# Patient Record
Sex: MALE | URBAN - METROPOLITAN AREA
[De-identification: names, ages, dates, MRNs, and addresses within clinical notes are randomized per-mention and may not be internally consistent; named-entity substitution may affect disease eponyms.]

---

## 2022-03-14 ENCOUNTER — INPATIENT (INPATIENT)
Facility: HOSPITAL | Age: 57
LOS: 14 days | Discharge: NURSING, REHAB OR LONG-TERM CARE | DRG: 282 | End: 2022-03-29
Attending: FAMILY MEDICINE | Admitting: INTERNAL MEDICINE
Payer: MEDICAID

## 2022-03-14 VITALS
DIASTOLIC BLOOD PRESSURE: 96 MMHG | WEIGHT: 134.92 LBS | SYSTOLIC BLOOD PRESSURE: 160 MMHG | TEMPERATURE: 99 F | RESPIRATION RATE: 20 BRPM | OXYGEN SATURATION: 100 % | HEIGHT: 66 IN | HEART RATE: 93 BPM

## 2022-03-14 DIAGNOSIS — K92.0 HEMATEMESIS: ICD-10-CM

## 2022-03-14 DIAGNOSIS — E83.51 HYPOCALCEMIA: ICD-10-CM

## 2022-03-14 DIAGNOSIS — K63.2 FISTULA OF INTESTINE: ICD-10-CM

## 2022-03-14 DIAGNOSIS — F32.9 MAJOR DEPRESSIVE DISORDER, SINGLE EPISODE, UNSPECIFIED: ICD-10-CM

## 2022-03-14 DIAGNOSIS — K80.20 CALCULUS OF GALLBLADDER WITHOUT CHOLECYSTITIS WITHOUT OBSTRUCTION: ICD-10-CM

## 2022-03-14 DIAGNOSIS — K31.1 ADULT HYPERTROPHIC PYLORIC STENOSIS: ICD-10-CM

## 2022-03-14 DIAGNOSIS — K59.00 CONSTIPATION, UNSPECIFIED: ICD-10-CM
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
## 2022-03-14 DIAGNOSIS — F11.20 OPIOID DEPENDENCE, UNCOMPLICATED: ICD-10-CM

## 2022-03-14 DIAGNOSIS — M46.44 DISCITIS, UNSPECIFIED, THORACIC REGION: ICD-10-CM

## 2022-03-14 DIAGNOSIS — K94.21 GASTROSTOMY HEMORRHAGE: ICD-10-CM

## 2022-03-14 DIAGNOSIS — K85.90 ACUTE PANCREATITIS WITHOUT NECROSIS OR INFECTION, UNSPECIFIED: ICD-10-CM

## 2022-03-14 DIAGNOSIS — R42 DIZZINESS AND GIDDINESS: ICD-10-CM

## 2022-03-14 DIAGNOSIS — K86.1 OTHER CHRONIC PANCREATITIS: ICD-10-CM

## 2022-03-14 DIAGNOSIS — E87.1 HYPO-OSMOLALITY AND HYPONATREMIA: ICD-10-CM

## 2022-03-14 DIAGNOSIS — E11.649 TYPE 2 DIABETES MELLITUS WITH HYPOGLYCEMIA WITHOUT COMA: ICD-10-CM

## 2022-03-14 DIAGNOSIS — K86.3 PSEUDOCYST OF PANCREAS: ICD-10-CM

## 2022-03-14 DIAGNOSIS — E43 UNSPECIFIED SEVERE PROTEIN-CALORIE MALNUTRITION: ICD-10-CM
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
## 2022-03-14 DIAGNOSIS — E88.09 OTHER DISORDERS OF PLASMA-PROTEIN METABOLISM, NOT ELSEWHERE CLASSIFIED: ICD-10-CM

## 2022-03-14 DIAGNOSIS — F41.9 ANXIETY DISORDER, UNSPECIFIED: ICD-10-CM

## 2022-03-14 DIAGNOSIS — D62 ACUTE POSTHEMORRHAGIC ANEMIA: ICD-10-CM

## 2022-03-14 DIAGNOSIS — K21.9 GASTRO-ESOPHAGEAL REFLUX DISEASE WITHOUT ESOPHAGITIS: ICD-10-CM

## 2022-03-14 DIAGNOSIS — Z79.4 LONG TERM (CURRENT) USE OF INSULIN: ICD-10-CM

## 2022-03-14 LAB
ALBUMIN SERPL ELPH-MCNC: 1.8 G/DL — LOW (ref 3.3–5)
ALP SERPL-CCNC: 1293 U/L — HIGH (ref 40–120)
ALT FLD-CCNC: 91 U/L — HIGH (ref 12–78)
ANION GAP SERPL CALC-SCNC: 6 MMOL/L — SIGNIFICANT CHANGE UP (ref 5–17)
APTT BLD: 31.4 SEC — SIGNIFICANT CHANGE UP (ref 27.5–35.5)
AST SERPL-CCNC: 63 U/L — HIGH (ref 15–37)
BASOPHILS # BLD AUTO: 0.02 K/UL — SIGNIFICANT CHANGE UP (ref 0–0.2)
BASOPHILS NFR BLD AUTO: 0.2 % — SIGNIFICANT CHANGE UP (ref 0–2)
BILIRUB SERPL-MCNC: 2.3 MG/DL — HIGH (ref 0.2–1.2)
BUN SERPL-MCNC: 11 MG/DL — SIGNIFICANT CHANGE UP (ref 7–23)
CALCIUM SERPL-MCNC: 8.5 MG/DL — SIGNIFICANT CHANGE UP (ref 8.5–10.1)
CHLORIDE SERPL-SCNC: 93 MMOL/L — LOW (ref 96–108)
CO2 SERPL-SCNC: 33 MMOL/L — HIGH (ref 22–31)
CREAT SERPL-MCNC: 0.53 MG/DL — SIGNIFICANT CHANGE UP (ref 0.5–1.3)
EGFR: 118 ML/MIN/1.73M2 — SIGNIFICANT CHANGE UP
EOSINOPHIL # BLD AUTO: 0.01 K/UL — SIGNIFICANT CHANGE UP (ref 0–0.5)
EOSINOPHIL NFR BLD AUTO: 0.1 % — SIGNIFICANT CHANGE UP (ref 0–6)
GLUCOSE SERPL-MCNC: 210 MG/DL — HIGH (ref 70–99)
HCT VFR BLD CALC: 27.9 % — LOW (ref 39–50)
HCT VFR BLD CALC: 28.4 % — LOW (ref 39–50)
HGB BLD-MCNC: 8.9 G/DL — LOW (ref 13–17)
HGB BLD-MCNC: 9.3 G/DL — LOW (ref 13–17)
IMM GRANULOCYTES NFR BLD AUTO: 0.3 % — SIGNIFICANT CHANGE UP (ref 0–1.5)
INR BLD: 1.28 RATIO — HIGH (ref 0.88–1.16)
LIDOCAIN IGE QN: 367 U/L — SIGNIFICANT CHANGE UP (ref 73–393)
LYMPHOCYTES # BLD AUTO: 1.04 K/UL — SIGNIFICANT CHANGE UP (ref 1–3.3)
LYMPHOCYTES # BLD AUTO: 8.9 % — LOW (ref 13–44)
MCHC RBC-ENTMCNC: 25.6 PG — LOW (ref 27–34)
MCHC RBC-ENTMCNC: 26.3 PG — LOW (ref 27–34)
MCHC RBC-ENTMCNC: 31.9 GM/DL — LOW (ref 32–36)
MCHC RBC-ENTMCNC: 32.7 GM/DL — SIGNIFICANT CHANGE UP (ref 32–36)
MCV RBC AUTO: 80.2 FL — SIGNIFICANT CHANGE UP (ref 80–100)
MCV RBC AUTO: 80.4 FL — SIGNIFICANT CHANGE UP (ref 80–100)
MONOCYTES # BLD AUTO: 0.59 K/UL — SIGNIFICANT CHANGE UP (ref 0–0.9)
MONOCYTES NFR BLD AUTO: 5 % — SIGNIFICANT CHANGE UP (ref 2–14)
NEUTROPHILS # BLD AUTO: 10.03 K/UL — HIGH (ref 1.8–7.4)
NEUTROPHILS NFR BLD AUTO: 85.5 % — HIGH (ref 43–77)
PLATELET # BLD AUTO: 520 K/UL — HIGH (ref 150–400)
PLATELET # BLD AUTO: 561 K/UL — HIGH (ref 150–400)
POTASSIUM SERPL-MCNC: 3.5 MMOL/L — SIGNIFICANT CHANGE UP (ref 3.5–5.3)
POTASSIUM SERPL-SCNC: 3.5 MMOL/L — SIGNIFICANT CHANGE UP (ref 3.5–5.3)
PROT SERPL-MCNC: 7.8 GM/DL — SIGNIFICANT CHANGE UP (ref 6–8.3)
PROTHROM AB SERPL-ACNC: 14.9 SEC — HIGH (ref 10.5–13.4)
RBC # BLD: 3.47 M/UL — LOW (ref 4.2–5.8)
RBC # BLD: 3.54 M/UL — LOW (ref 4.2–5.8)
RBC # FLD: 14.1 % — SIGNIFICANT CHANGE UP (ref 10.3–14.5)
RBC # FLD: 14.2 % — SIGNIFICANT CHANGE UP (ref 10.3–14.5)
SODIUM SERPL-SCNC: 132 MMOL/L — LOW (ref 135–145)
WBC # BLD: 11.66 K/UL — HIGH (ref 3.8–10.5)
WBC # BLD: 11.73 K/UL — HIGH (ref 3.8–10.5)
WBC # FLD AUTO: 11.66 K/UL — HIGH (ref 3.8–10.5)
WBC # FLD AUTO: 11.73 K/UL — HIGH (ref 3.8–10.5)

## 2022-03-14 PROCEDURE — 71260 CT THORAX DX C+: CPT

## 2022-03-14 PROCEDURE — 87040 BLOOD CULTURE FOR BACTERIA: CPT

## 2022-03-14 PROCEDURE — P9016: CPT

## 2022-03-14 PROCEDURE — U0005: CPT

## 2022-03-14 PROCEDURE — C2617: CPT

## 2022-03-14 PROCEDURE — 74018 RADEX ABDOMEN 1 VIEW: CPT

## 2022-03-14 PROCEDURE — 84100 ASSAY OF PHOSPHORUS: CPT

## 2022-03-14 PROCEDURE — 80048 BASIC METABOLIC PNL TOTAL CA: CPT

## 2022-03-14 PROCEDURE — C1889: CPT

## 2022-03-14 PROCEDURE — 85025 COMPLETE CBC W/AUTO DIFF WBC: CPT

## 2022-03-14 PROCEDURE — U0003: CPT

## 2022-03-14 PROCEDURE — 86920 COMPATIBILITY TEST SPIN: CPT

## 2022-03-14 PROCEDURE — P9040: CPT

## 2022-03-14 PROCEDURE — 83036 HEMOGLOBIN GLYCOSYLATED A1C: CPT

## 2022-03-14 PROCEDURE — 80061 LIPID PANEL: CPT

## 2022-03-14 PROCEDURE — 82962 GLUCOSE BLOOD TEST: CPT

## 2022-03-14 PROCEDURE — 97530 THERAPEUTIC ACTIVITIES: CPT | Mod: GP

## 2022-03-14 PROCEDURE — G1004: CPT

## 2022-03-14 PROCEDURE — 36430 TRANSFUSION BLD/BLD COMPNT: CPT

## 2022-03-14 PROCEDURE — 97162 PT EVAL MOD COMPLEX 30 MIN: CPT | Mod: GP

## 2022-03-14 PROCEDURE — 84478 ASSAY OF TRIGLYCERIDES: CPT

## 2022-03-14 PROCEDURE — 83735 ASSAY OF MAGNESIUM: CPT

## 2022-03-14 PROCEDURE — 80074 ACUTE HEPATITIS PANEL: CPT

## 2022-03-14 PROCEDURE — 85018 HEMOGLOBIN: CPT

## 2022-03-14 PROCEDURE — 83605 ASSAY OF LACTIC ACID: CPT

## 2022-03-14 PROCEDURE — 74183 MRI ABD W/O CNTR FLWD CNTR: CPT

## 2022-03-14 PROCEDURE — 99254 IP/OBS CNSLTJ NEW/EST MOD 60: CPT

## 2022-03-14 PROCEDURE — 82150 ASSAY OF AMYLASE: CPT

## 2022-03-14 PROCEDURE — 83690 ASSAY OF LIPASE: CPT

## 2022-03-14 PROCEDURE — 83540 ASSAY OF IRON: CPT

## 2022-03-14 PROCEDURE — 83880 ASSAY OF NATRIURETIC PEPTIDE: CPT

## 2022-03-14 PROCEDURE — 88172 CYTP DX EVAL FNA 1ST EA SITE: CPT

## 2022-03-14 PROCEDURE — 36415 COLL VENOUS BLD VENIPUNCTURE: CPT

## 2022-03-14 PROCEDURE — 72157 MRI CHEST SPINE W/O & W/DYE: CPT

## 2022-03-14 PROCEDURE — 80076 HEPATIC FUNCTION PANEL: CPT

## 2022-03-14 PROCEDURE — 80053 COMPREHEN METABOLIC PANEL: CPT

## 2022-03-14 PROCEDURE — 71045 X-RAY EXAM CHEST 1 VIEW: CPT

## 2022-03-14 PROCEDURE — 82947 ASSAY GLUCOSE BLOOD QUANT: CPT

## 2022-03-14 PROCEDURE — C9113: CPT

## 2022-03-14 PROCEDURE — 70450 CT HEAD/BRAIN W/O DYE: CPT

## 2022-03-14 PROCEDURE — 99223 1ST HOSP IP/OBS HIGH 75: CPT

## 2022-03-14 PROCEDURE — 93010 ELECTROCARDIOGRAM REPORT: CPT

## 2022-03-14 PROCEDURE — 97116 GAIT TRAINING THERAPY: CPT | Mod: GP

## 2022-03-14 PROCEDURE — 85014 HEMATOCRIT: CPT

## 2022-03-14 PROCEDURE — 82607 VITAMIN B-12: CPT

## 2022-03-14 PROCEDURE — 82248 BILIRUBIN DIRECT: CPT

## 2022-03-14 PROCEDURE — 83550 IRON BINDING TEST: CPT

## 2022-03-14 PROCEDURE — 85027 COMPLETE CBC AUTOMATED: CPT

## 2022-03-14 PROCEDURE — 88307 TISSUE EXAM BY PATHOLOGIST: CPT

## 2022-03-14 PROCEDURE — 82550 ASSAY OF CK (CPK): CPT

## 2022-03-14 PROCEDURE — A9579: CPT

## 2022-03-14 PROCEDURE — 94760 N-INVAS EAR/PLS OXIMETRY 1: CPT

## 2022-03-14 PROCEDURE — 74177 CT ABD & PELVIS W/CONTRAST: CPT | Mod: 26,MG

## 2022-03-14 PROCEDURE — 82977 ASSAY OF GGT: CPT

## 2022-03-14 PROCEDURE — 82746 ASSAY OF FOLIC ACID SERUM: CPT

## 2022-03-14 PROCEDURE — 86301 IMMUNOASSAY TUMOR CA 19-9: CPT

## 2022-03-14 PROCEDURE — 84484 ASSAY OF TROPONIN QUANT: CPT

## 2022-03-14 PROCEDURE — C1769: CPT

## 2022-03-14 PROCEDURE — 84443 ASSAY THYROID STIM HORMONE: CPT

## 2022-03-14 PROCEDURE — 93005 ELECTROCARDIOGRAM TRACING: CPT

## 2022-03-14 PROCEDURE — 76000 FLUOROSCOPY <1 HR PHYS/QHP: CPT

## 2022-03-14 PROCEDURE — 71045 X-RAY EXAM CHEST 1 VIEW: CPT | Mod: 26

## 2022-03-14 PROCEDURE — L8699: CPT

## 2022-03-14 PROCEDURE — 87389 HIV-1 AG W/HIV-1&-2 AB AG IA: CPT

## 2022-03-14 PROCEDURE — 82378 CARCINOEMBRYONIC ANTIGEN: CPT

## 2022-03-14 PROCEDURE — 82306 VITAMIN D 25 HYDROXY: CPT

## 2022-03-14 PROCEDURE — 99285 EMERGENCY DEPT VISIT HI MDM: CPT

## 2022-03-14 PROCEDURE — 82728 ASSAY OF FERRITIN: CPT

## 2022-03-14 RX ORDER — DEXTROSE 50 % IN WATER 50 %
25 SYRINGE (ML) INTRAVENOUS ONCE
Refills: 0 | Status: DISCONTINUED | OUTPATIENT
Start: 2022-03-14 | End: 2022-03-16

## 2022-03-14 RX ORDER — SODIUM CHLORIDE 9 MG/ML
1000 INJECTION, SOLUTION INTRAVENOUS
Refills: 0 | Status: DISCONTINUED | OUTPATIENT
Start: 2022-03-14 | End: 2022-03-16

## 2022-03-14 RX ORDER — DEXTROSE 50 % IN WATER 50 %
15 SYRINGE (ML) INTRAVENOUS ONCE
Refills: 0 | Status: DISCONTINUED | OUTPATIENT
Start: 2022-03-14 | End: 2022-03-16

## 2022-03-14 RX ORDER — LIPASE/PROTEASE/AMYLASE 16-48-48K
3 CAPSULE,DELAYED RELEASE (ENTERIC COATED) ORAL
Qty: 0 | Refills: 0 | DISCHARGE

## 2022-03-14 RX ORDER — MORPHINE SULFATE 50 MG/1
4 CAPSULE, EXTENDED RELEASE ORAL ONCE
Refills: 0 | Status: DISCONTINUED | OUTPATIENT
Start: 2022-03-14 | End: 2022-03-14

## 2022-03-14 RX ORDER — ACETAMINOPHEN 500 MG
2 TABLET ORAL
Qty: 0 | Refills: 0 | DISCHARGE

## 2022-03-14 RX ORDER — METHADONE HYDROCHLORIDE 40 MG/1
60 TABLET ORAL DAILY
Refills: 0 | Status: DISCONTINUED | OUTPATIENT
Start: 2022-03-14 | End: 2022-03-21

## 2022-03-14 RX ORDER — ASPIRIN/CALCIUM CARB/MAGNESIUM 324 MG
81 TABLET ORAL DAILY
Refills: 0 | Status: DISCONTINUED | OUTPATIENT
Start: 2022-03-14 | End: 2022-03-14

## 2022-03-14 RX ORDER — PANTOPRAZOLE SODIUM 20 MG/1
80 TABLET, DELAYED RELEASE ORAL ONCE
Refills: 0 | Status: COMPLETED | OUTPATIENT
Start: 2022-03-14 | End: 2022-03-14

## 2022-03-14 RX ORDER — ALBUTEROL 90 UG/1
2 AEROSOL, METERED ORAL
Qty: 0 | Refills: 0 | DISCHARGE

## 2022-03-14 RX ORDER — LIPASE/PROTEASE/AMYLASE 16-48-48K
1 CAPSULE,DELAYED RELEASE (ENTERIC COATED) ORAL
Qty: 0 | Refills: 0 | DISCHARGE

## 2022-03-14 RX ORDER — THIAMINE MONONITRATE (VIT B1) 100 MG
100 TABLET ORAL DAILY
Refills: 0 | Status: DISCONTINUED | OUTPATIENT
Start: 2022-03-14 | End: 2022-03-29

## 2022-03-14 RX ORDER — ALBUTEROL 90 UG/1
2 AEROSOL, METERED ORAL EVERY 6 HOURS
Refills: 0 | Status: DISCONTINUED | OUTPATIENT
Start: 2022-03-14 | End: 2022-03-29

## 2022-03-14 RX ORDER — DEXTROSE 50 % IN WATER 50 %
12.5 SYRINGE (ML) INTRAVENOUS ONCE
Refills: 0 | Status: DISCONTINUED | OUTPATIENT
Start: 2022-03-14 | End: 2022-03-16

## 2022-03-14 RX ORDER — LEVALBUTEROL 1.25 MG/.5ML
2 SOLUTION, CONCENTRATE RESPIRATORY (INHALATION)
Qty: 0 | Refills: 0 | DISCHARGE

## 2022-03-14 RX ORDER — SODIUM CHLORIDE 9 MG/ML
1000 INJECTION INTRAMUSCULAR; INTRAVENOUS; SUBCUTANEOUS
Refills: 0 | Status: DISCONTINUED | OUTPATIENT
Start: 2022-03-14 | End: 2022-03-14

## 2022-03-14 RX ORDER — MORPHINE SULFATE 50 MG/1
4 CAPSULE, EXTENDED RELEASE ORAL EVERY 4 HOURS
Refills: 0 | Status: DISCONTINUED | OUTPATIENT
Start: 2022-03-14 | End: 2022-03-16

## 2022-03-14 RX ORDER — ONDANSETRON 8 MG/1
4 TABLET, FILM COATED ORAL EVERY 8 HOURS
Refills: 0 | Status: DISCONTINUED | OUTPATIENT
Start: 2022-03-14 | End: 2022-03-29

## 2022-03-14 RX ORDER — LIPASE/PROTEASE/AMYLASE 16-48-48K
1 CAPSULE,DELAYED RELEASE (ENTERIC COATED) ORAL
Refills: 0 | Status: DISCONTINUED | OUTPATIENT
Start: 2022-03-14 | End: 2022-03-29

## 2022-03-14 RX ORDER — SODIUM CHLORIDE 9 MG/ML
1000 INJECTION INTRAMUSCULAR; INTRAVENOUS; SUBCUTANEOUS
Refills: 0 | Status: DISCONTINUED | OUTPATIENT
Start: 2022-03-14 | End: 2022-03-15

## 2022-03-14 RX ORDER — ACETAMINOPHEN 500 MG
650 TABLET ORAL EVERY 6 HOURS
Refills: 0 | Status: DISCONTINUED | OUTPATIENT
Start: 2022-03-14 | End: 2022-03-29

## 2022-03-14 RX ORDER — THIAMINE MONONITRATE (VIT B1) 100 MG
1 TABLET ORAL
Qty: 0 | Refills: 0 | DISCHARGE

## 2022-03-14 RX ORDER — FOLIC ACID 0.8 MG
1 TABLET ORAL
Qty: 0 | Refills: 0 | DISCHARGE

## 2022-03-14 RX ORDER — ENOXAPARIN SODIUM 100 MG/ML
40 INJECTION SUBCUTANEOUS EVERY 24 HOURS
Refills: 0 | Status: DISCONTINUED | OUTPATIENT
Start: 2022-03-14 | End: 2022-03-14

## 2022-03-14 RX ORDER — ESOMEPRAZOLE MAGNESIUM 40 MG/1
1 CAPSULE, DELAYED RELEASE ORAL
Qty: 0 | Refills: 0 | DISCHARGE

## 2022-03-14 RX ORDER — ASPIRIN/CALCIUM CARB/MAGNESIUM 324 MG
1 TABLET ORAL
Qty: 0 | Refills: 0 | DISCHARGE

## 2022-03-14 RX ORDER — GLUCAGON INJECTION, SOLUTION 0.5 MG/.1ML
1 INJECTION, SOLUTION SUBCUTANEOUS ONCE
Refills: 0 | Status: DISCONTINUED | OUTPATIENT
Start: 2022-03-14 | End: 2022-03-16

## 2022-03-14 RX ORDER — LANOLIN ALCOHOL/MO/W.PET/CERES
3 CREAM (GRAM) TOPICAL AT BEDTIME
Refills: 0 | Status: DISCONTINUED | OUTPATIENT
Start: 2022-03-14 | End: 2022-03-29

## 2022-03-14 RX ORDER — LIPASE/PROTEASE/AMYLASE 16-48-48K
1 CAPSULE,DELAYED RELEASE (ENTERIC COATED) ORAL
Refills: 0 | Status: DISCONTINUED | OUTPATIENT
Start: 2022-03-14 | End: 2022-03-14

## 2022-03-14 RX ORDER — PANTOPRAZOLE SODIUM 20 MG/1
8 TABLET, DELAYED RELEASE ORAL
Qty: 80 | Refills: 0 | Status: DISCONTINUED | OUTPATIENT
Start: 2022-03-14 | End: 2022-03-17

## 2022-03-14 RX ORDER — INSULIN LISPRO 100/ML
VIAL (ML) SUBCUTANEOUS
Refills: 0 | Status: DISCONTINUED | OUTPATIENT
Start: 2022-03-14 | End: 2022-03-16

## 2022-03-14 RX ORDER — FOLIC ACID 0.8 MG
1 TABLET ORAL DAILY
Refills: 0 | Status: DISCONTINUED | OUTPATIENT
Start: 2022-03-14 | End: 2022-03-29

## 2022-03-14 RX ORDER — PANTOPRAZOLE SODIUM 20 MG/1
40 TABLET, DELAYED RELEASE ORAL
Refills: 0 | Status: DISCONTINUED | OUTPATIENT
Start: 2022-03-14 | End: 2022-03-14

## 2022-03-14 RX ORDER — INSULIN LISPRO 100/ML
2 VIAL (ML) SUBCUTANEOUS
Refills: 0 | Status: DISCONTINUED | OUTPATIENT
Start: 2022-03-14 | End: 2022-03-15

## 2022-03-14 RX ORDER — INSULIN GLARGINE 100 [IU]/ML
6 INJECTION, SOLUTION SUBCUTANEOUS AT BEDTIME
Refills: 0 | Status: DISCONTINUED | OUTPATIENT
Start: 2022-03-14 | End: 2022-03-15

## 2022-03-14 RX ORDER — MORPHINE SULFATE 50 MG/1
2 CAPSULE, EXTENDED RELEASE ORAL ONCE
Refills: 0 | Status: DISCONTINUED | OUTPATIENT
Start: 2022-03-14 | End: 2022-03-14

## 2022-03-14 RX ADMIN — SODIUM CHLORIDE 100 MILLILITER(S): 9 INJECTION INTRAMUSCULAR; INTRAVENOUS; SUBCUTANEOUS at 13:54

## 2022-03-14 RX ADMIN — SODIUM CHLORIDE 75 MILLILITER(S): 9 INJECTION INTRAMUSCULAR; INTRAVENOUS; SUBCUTANEOUS at 10:34

## 2022-03-14 RX ADMIN — MORPHINE SULFATE 2 MILLIGRAM(S): 50 CAPSULE, EXTENDED RELEASE ORAL at 19:54

## 2022-03-14 RX ADMIN — Medication 2 UNIT(S): at 17:43

## 2022-03-14 RX ADMIN — MORPHINE SULFATE 4 MILLIGRAM(S): 50 CAPSULE, EXTENDED RELEASE ORAL at 22:27

## 2022-03-14 RX ADMIN — MORPHINE SULFATE 4 MILLIGRAM(S): 50 CAPSULE, EXTENDED RELEASE ORAL at 09:44

## 2022-03-14 RX ADMIN — Medication 2: at 17:42

## 2022-03-14 RX ADMIN — ENOXAPARIN SODIUM 40 MILLIGRAM(S): 100 INJECTION SUBCUTANEOUS at 10:34

## 2022-03-14 RX ADMIN — MORPHINE SULFATE 4 MILLIGRAM(S): 50 CAPSULE, EXTENDED RELEASE ORAL at 16:10

## 2022-03-14 RX ADMIN — MORPHINE SULFATE 4 MILLIGRAM(S): 50 CAPSULE, EXTENDED RELEASE ORAL at 14:09

## 2022-03-14 RX ADMIN — PANTOPRAZOLE SODIUM 80 MILLIGRAM(S): 20 TABLET, DELAYED RELEASE ORAL at 19:59

## 2022-03-14 RX ADMIN — PANTOPRAZOLE SODIUM 10 MG/HR: 20 TABLET, DELAYED RELEASE ORAL at 18:46

## 2022-03-14 RX ADMIN — MORPHINE SULFATE 4 MILLIGRAM(S): 50 CAPSULE, EXTENDED RELEASE ORAL at 18:05

## 2022-03-14 RX ADMIN — MORPHINE SULFATE 4 MILLIGRAM(S): 50 CAPSULE, EXTENDED RELEASE ORAL at 13:54

## 2022-03-14 RX ADMIN — ONDANSETRON 4 MILLIGRAM(S): 8 TABLET, FILM COATED ORAL at 17:51

## 2022-03-14 RX ADMIN — MORPHINE SULFATE 4 MILLIGRAM(S): 50 CAPSULE, EXTENDED RELEASE ORAL at 15:55

## 2022-03-14 RX ADMIN — INSULIN GLARGINE 6 UNIT(S): 100 INJECTION, SOLUTION SUBCUTANEOUS at 22:11

## 2022-03-14 RX ADMIN — MORPHINE SULFATE 4 MILLIGRAM(S): 50 CAPSULE, EXTENDED RELEASE ORAL at 22:07

## 2022-03-14 RX ADMIN — MORPHINE SULFATE 4 MILLIGRAM(S): 50 CAPSULE, EXTENDED RELEASE ORAL at 18:20

## 2022-03-14 RX ADMIN — SODIUM CHLORIDE 100 MILLILITER(S): 9 INJECTION INTRAMUSCULAR; INTRAVENOUS; SUBCUTANEOUS at 18:47

## 2022-03-14 RX ADMIN — MORPHINE SULFATE 4 MILLIGRAM(S): 50 CAPSULE, EXTENDED RELEASE ORAL at 07:04

## 2022-03-14 RX ADMIN — MORPHINE SULFATE 2 MILLIGRAM(S): 50 CAPSULE, EXTENDED RELEASE ORAL at 20:15

## 2022-03-14 RX ADMIN — MORPHINE SULFATE 4 MILLIGRAM(S): 50 CAPSULE, EXTENDED RELEASE ORAL at 10:30

## 2022-03-14 RX ADMIN — METHADONE HYDROCHLORIDE 60 MILLIGRAM(S): 40 TABLET ORAL at 15:05

## 2022-03-14 NOTE — H&P ADULT - NSHPREVIEWOFSYSTEMS_GEN_ALL_CORE
ROS:  General:  weight loss  Skin: bed sores  Musculoskeletal: No arthalgias, myalgias or joint swelling  Eyes: No visual changes or eye pain  Ears: No hearing loss , otorrhea or ear pain  Nose, Mouth, Throat: No nasal congestion, rhinorrhea, oral lesions, postnasal drip or sore throat  Cardio: No chest pain or palpitations. no lower extremity edema. no syncope. no claudication.   Respiratory: No cough, shortness of breath or wheezing   GI: abd pain  : No urinary frequency, hematuria, incontinence, or dysuria  Neurologic: No headaches, parasthesias, confusion, dysarthria or gait instability  Psychiatric:  No anxiety or depression  Lymphatic:  No easy bruising, easy bleeding or swollen glands  Allergic: No itching, sneezing , watery eyes, clear rhinorrhea or recurrent infections

## 2022-03-14 NOTE — CHART NOTE - NSCHARTNOTEFT_GEN_A_CORE
Hospitalist Attending:    @ 6pm, called by nursing patient with episode of vomiting and 100 ml of coffee ground emesis.    Patient was transferred with NGT and self removed earlier today.  Unclear reason for NGT.    Change diet to NPO.    Protonix 80 IV x 1 now and protonix gtt.    Notified GI of change in status.    Check CBC repeat @ 8pm.    D/c asa and lovenox for now.

## 2022-03-14 NOTE — ED ADULT NURSE REASSESSMENT NOTE - NS ED NURSE REASSESS COMMENT FT1
Pt received A+OX4. VSS. Afebrile. Pt c/o diffuse abdominal pain, morphine had just been given for abdominal pain. Pt is cachectic. Left arm PICC line noted. Bloody drainage noted under dssg. IV team to change dssg. Niels inserted in ED patent. Pt has numerous pressure ulcers to his posterior body. Stage 2 on sacrum assessed [id number], new foam dssg applied. Pt refused to allow me to assess remaining wounds. 2 large foam dssgs on back and on B/L hips. Awaiting further orders. Monitored closely. Call bell in reach.

## 2022-03-14 NOTE — ED PROVIDER NOTE - PHYSICAL EXAMINATION
GEN - NAD; well appearing; A+O x3   HEAD - NC/AT     EYES - EOMI, no conjunctival pallor, no scleral icterus  ENT -   mucous membranes  moist , no discharge      NECK - Neck supple  PULM - CTA b/l,  symmetric breath sounds  COR -  RRR, S1 S2, no murmurs  ABD - epigastric TTP   BACK - no CVA tenderness, nontender spine     EXTREMS - picc LUE   SKIN - no rash or bruising      NEUROLOGIC - alert, sensation nl, motor 5/5 RUE/LUE/RLE/LLE

## 2022-03-14 NOTE — H&P ADULT - ASSESSMENT
57 y/o male with PMHX of IVDA with heroin/ fentanyl-- last used 2 months ago as per patient, opioid dependence on methadone, IDDM, chronic pancreatitis, and recent significant weight loss over the last 1 year who was a transfer from [facility] ([place]) for possible ERCP after being found to have ? mass on MRI abdomen with chronic pancreatitis.      #Abd pain/ Chronic Pancreatitis/ ? Pancreatic mass/ biliary ductal dilatation:    Admit to med surg.    GI eval-- Dr. Lemon to see.    CT/ MRI/ US reports from outside reviewed.    Repeat CT done here-- f/u results.    No plans for ERCP today-- ? WED as per GI.    NPO for now.    IVF-- NSS @ 71.      #Gastric Distension on CT:  Unclear etiology.    Upon arrival to ER, patient had NGT but pulled out and refused replacement.    Patient denies nausea/ vomiting at present.    GI eval.    Zofran PRN.      #Weight Loss:    Check HIV/ hepatitis with IVDA hx.      #IVDA/ Opioid Dependence:    Patient was on methadone 60 mg daily as per notes.    Now NPO receiving IV morphine.    Will adjust after discussing with GI.      #IDDM:    Resume low dose lantus/ meal time insulin/ sliding scale.    Glucose ACHS.    Patient NPO for now pending GI eval.      #DVT Proph:  Lovenox.   55 y/o male with PMHX of IVDA with heroin/ fentanyl-- last used 2 months ago as per patient, opioid dependence on methadone, IDDM, chronic pancreatitis, and recent significant weight loss over the last 1 year who was a transfer from [facility] ([place]) for possible ERCP after being found to have ? mass on MRI abdomen with chronic pancreatitis.      #Abd pain/ Chronic Pancreatitis/ Choledocholithiasis/ Biliary ductal dilatation-- r/o mass:    Admit to med surg.    GI eval-- Dr. Lemon to see.    CT/ MRI/ US reports from outside reviewed.    Repeat CT done here.    CT with choledocholithiasis/ pancreatic duct stones as well is biliary ductal dilatation of unclear etiology.    No plans for ERCP today-- ? WED as per GI.    NPO for now.    IVF-- NSS @ 71.      #Gastric Distension on CT:  Unclear etiology.    Upon arrival to ER, patient had NGT but pulled out and refused replacement.    Patient denies nausea/ vomiting at present.    GI eval.    Zofran PRN.      #Weight Loss:    Check HIV/ hepatitis with IVDA hx.      #IVDA/ Opioid Dependence:    Patient was on methadone 60 mg daily as per notes.    Now NPO receiving IV morphine.    Will adjust after discussing with GI.      #IDDM:    Resume low dose lantus/ meal time insulin/ sliding scale.    Glucose ACHS.    Patient NPO for now pending GI eval.      #DVT Proph:  Lovenox.   55 y/o male with PMHX of IVDA with heroin/ fentanyl-- last used 2 months ago as per patient, opioid dependence on methadone, IDDM, chronic pancreatitis, and recent significant weight loss over the last 1 year who was a transfer from [facility] ([place]) for possible ERCP after being found to have ? mass on MRI abdomen with chronic pancreatitis.      #Abd pain/ Chronic Pancreatitis/ Choledocholithiasis/ Biliary ductal dilatation-- r/o mass:    Admit to med surg.    GI eval-- Dr. Lemon to see.    CT/ MRI/ US reports from outside reviewed.    Repeat CT done here.    CT with choledocholithiasis/ pancreatic duct stones as well is biliary ductal dilatation of unclear etiology.    No plans for ERCP today-- ? WED as per GI.    NPO for now.    IVF-- NSS @ 71.      #Gastric Distension on CT:  Unclear etiology.    Upon arrival to ER, patient had NGT but pulled out and refused replacement.    Patient denies nausea/ vomiting at present.    GI eval.    Zofran PRN.      #Weight Loss:    Check HIV/ hepatitis with IVDA hx.      #IVDA/ Opioid Dependence:    Patient was on methadone 60 mg daily as per notes.    Now NPO receiving IV morphine.    Will adjust after discussing with GI.      #IDDM:    Resume low dose lantus/ meal time insulin/ sliding scale.    Glucose ACHS.    Patient NPO for now pending GI eval.      #Anemia:    Suspect chronic-- unclear baseline.    Check iron/ b12/ folate.      #Severe Protein Calorie Malnutrition/ Cachexia:      Nutrition eval.      #DVT Proph:  Lovenox.

## 2022-03-14 NOTE — PATIENT PROFILE ADULT - FALL HARM RISK - HARM RISK INTERVENTIONS

## 2022-03-14 NOTE — CONSULT NOTE ADULT - ATTENDING COMMENTS
56 year old man with acute on chronic pancreatitis due to polysubstance abuse, now with significant weight loss.     Need to r/o occult mass lesion as a cause, vs just worsening chronic pancreatitis.   MRI/MRCP with/without contrast  Clears ok, he ripped his NGT out  EUS for evaluation, possible ERCP for intraductal stones. Need to evaluate cuase of his GOO  I will contact panc/bili surgery

## 2022-03-14 NOTE — ED ADULT NURSE NOTE - NSIMPLEMENTINTERV_GEN_ALL_ED
Implemented All Fall Risk Interventions:  [place] to call system. Call bell, personal items and telephone within reach. Instruct patient to call for assistance. Room bathroom lighting operational. Non-slip footwear when patient is off stretcher. Physically safe environment: no spills, clutter or unnecessary equipment. Stretcher in lowest position, wheels locked, appropriate side rails in place. Provide visual cue, wrist band, yellow gown, etc. Monitor gait and stability. Monitor for mental status changes and reorient to person, place, and time. Review medications for side effects contributing to fall risk. Reinforce activity limits and safety measures with patient and family.

## 2022-03-14 NOTE — H&P ADULT - HISTORY OF PRESENT ILLNESS
55 y/o male with PMHX of IVDA with heroin-- last used 2 months ago as per patient, opioid dependence on methadone, IDDM, chronic pancreatitis, and recent significant weight loss over the last 1 year who was a transfer from [facility] ([place]) for possible ERCP.  Patient initially presented to outside hospital for worsening abdominal pain.  Patient had CT/ MRI/ US of the abdomen which showed chronic pancreatitis, gastric distention (for which he had an NGT),  intra/ extra hepatic biliary ductal dilatation, and fluid collection/ ? mass in the head of the pancreas compressing the SMV and proximal portal vein.  Patient was transferred to  for possible need for ERCP and further GI evaluation.  Upon admission, patient with significantly elevated ALKPHOS 1200.        PAST MEDICAL & SURGICAL HISTORY:  IVDA  Opioid dependance  Pancreatitis    FAMILY HISTORY:  Unable to obtain from patient at this time.    Social History:    Previously living in SNF.    Transfer from [facility].    +Drug use-- IVDA / heroin/ fentanyl.    No tob use.      Allergies:  No Known Allergies

## 2022-03-14 NOTE — ED PROVIDER NOTE - PROGRESS NOTE DETAILS
Homar Harding MD : CT w/ NG at [facility], attempted to advance and pt self d'sherice tube, pt refused replaceemnt.

## 2022-03-14 NOTE — ED ADULT NURSE NOTE - CHIEF COMPLAINT QUOTE
patient bibems as transfer from [facility] in [place] for ERCP related to gastric outlet obstruction/pancreatitis. patient with PICC line to [facility], blood noted around site of insertion of the PICC line but apparently is still patent as per [facility] nursing staff. pt also with PIV in E placed by [facility] staff. multiple sacral wounds noted to patient as per ems. pt also with left nare, OG tube at 49. pt co abd pain and chest pain, 7/10. last dose of pain meds prior to leaving [facility]. patient was accepted for admission by Dr. Yoder, [facility] medicine.

## 2022-03-14 NOTE — ED ADULT NURSE REASSESSMENT NOTE - NS ED NURSE REASSESS COMMENT FT1
pt has left upper arm midline pt IV assessed by IV team RN Shara pt refused dressing change at this time agrees to change dressing when he is admitted to the floor, RN aware, pt medicated for pain,  NG  pulled out by pt pt refusing NGT  MD Harding made aware,

## 2022-03-14 NOTE — ED PROVIDER NOTE - NS ED ROS FT
Gen: No fever, normal appetite  Eyes: No eye irritation or discharge  ENT: No ear pain, congestion, sore throat  Resp: No cough or trouble breathing  Cardiovascular: No chest pain or palpitation  Gastroenteric: abdominal pain   :  No change in urine output; no dysuria  MS: No joint or muscle pain  Skin: No rashes  Neuro: No headache; no abnormal movements  Remainder negative, except as per the HPI

## 2022-03-14 NOTE — H&P ADULT - NSHPPHYSICALEXAM_GEN_ALL_CORE
Addended by: STEPHANE IZAGUIRRE on: 2/13/2017 03:15 PM     Modules accepted: Orders     PEx  T(C): 37.3 (03-14-22 @ 11:18), Max: 37.3 (03-14-22 @ 11:18)  HR: 88 (03-14-22 @ 11:18) (88 - 93)  BP: 166/89 (03-14-22 @ 11:18) (158/91 - 184/96)  RR: 16 (03-14-22 @ 11:18) (16 - 20)  SpO2: 100% (03-14-22 @ 11:18) (99% - 100%)  Wt(kg): --  General:  extremely cachectic.  malnourished.  chronically ill appearing.    Skin: multiple decubitus ulcers on back/ buttock area  Head: normocephalic, atraumatic     Sinuses: non-tender  Nose: no external lesions, mucosa non-inflamed, septum and turbinates normal  Throat: no erythema, exudates or lesions.  Neck: Supple without lymphadenopathy. Thyroid no thyromegaly, no palpable thyroid nodules, no palpable nodules or masses, carotid arteries no bruits.   Breasts: No palpable masses or lesions.  Heart: RRR, no murmur or gallop.  Normal S1, S2.  No S3, S4.   Lungs: CTA bilaterally, no wheezes, rhonchi, rales.  Breathing unlabored.   Chest wall: Normal insp   Abdomen:  Soft, diffuse abd pain --periumbilical mostly.  tender.  +BS.    Back: spine normal without deformity or tenderness.  Normal ROM   : Exam normal.  no inguinal hernias.  Extremities: No deformities, clubbing, cyanosis, or edema.  Musculoskeletal: muscle wasting  Neurologic: CN 2-12 normal. Sensation to pain, touch and proprioception normal. DTRs normal in upper and lower extremities. No pathologic reflexes.  Motor normal.  Psychiatric: Oriented X3, intact recent and remote memory, judgement and insight, normal mood and affect.

## 2022-03-14 NOTE — ED PROVIDER NOTE - OBJECTIVE STATEMENT
55yo m pmh diabetes, asthma, anemia, pancreatitis, sacral ulcers, GERD, opioid dependance on methadone, heroine and fentanyl use, transferred from [place] for ERCP. transfer paperwork limited however MRI report w/ pancreatitis + questionable pseudocyst and pancreatic ductal dilatation.

## 2022-03-14 NOTE — ED ADULT TRIAGE NOTE - CHIEF COMPLAINT QUOTE
patient bibems as transfer from [facility] in [place] for ERCP related to gastric outlet obstruction/pancreatitis. patient with PICC line to [facility], blood noted around site of insertion of the PICC line but apparently is still patent as per [facility] nursing staff. pt also with PIV in E placed by [facility] staff. multiple sacral wounds noted to patient as per ems. pt also with left nare OG tube at 48. pt co abd pain and chest pain, 7/10. last dose of pain meds prior to leaving [facility]. patient bibems as transfer from [facility] in [place] for ERCP related to gastric outlet obstruction/pancreatitis. patient with PICC line to [facility], blood noted around site of insertion of the PICC line but apparently is still patent as per [facility] nursing staff. pt also with PIV in E placed by [facility] staff. multiple sacral wounds noted to patient as per ems. pt also with left nare, OG tube at 48. pt co abd pain and chest pain, 7/10. last dose of pain meds prior to leaving [facility]. patient was accepted for admission by Dr. Yoder, [facility] medicine.

## 2022-03-14 NOTE — CONSULT NOTE ADULT - SUBJECTIVE AND OBJECTIVE BOX
Patient is a 56y old  Male who presents with a chief complaint of abdominal pain-- transfer for possible ERCP.    HPI:  55 y/o male with hx of IVDA with heroin-- last used 2 months ago as per patient, opioid dependence on methadone, IDDM, chronic pancreatitis, and recent significant weight loss over the last 1 year who was a transfer from [facility] ([place]) for possible ERCP.  Patient reports he lives in a skilled nursing facility. Presented to [facility] for eval of progressively worsening abdominal pain x 1 month. He reports diffuse 7/10 abdominal pain radiating into his back, worse with movement. Denies nausea, vomiting, constipation, diarrhea.     PAST MEDICAL & SURGICAL HISTORY:  IVDA  Opioid dependance  Pancreatitis    MEDICATIONS  (STANDING):  dextrose 40% Gel 15 Gram(s) Oral once  dextrose 5%. 1000 milliLiter(s) (50 mL/Hr) IV Continuous <Continuous>  dextrose 5%. 1000 milliLiter(s) (100 mL/Hr) IV Continuous <Continuous>  dextrose 50% Injectable 25 Gram(s) IV Push once  dextrose 50% Injectable 12.5 Gram(s) IV Push once  dextrose 50% Injectable 25 Gram(s) IV Push once  enoxaparin Injectable 40 milliGRAM(s) SubCutaneous every 24 hours  glucagon  Injectable 1 milliGRAM(s) IntraMuscular once  insulin glargine Injectable (LANTUS) 6 Unit(s) SubCutaneous at bedtime  insulin lispro (ADMELOG) corrective regimen sliding scale   SubCutaneous three times a day before meals  insulin lispro Injectable (ADMELOG) 2 Unit(s) SubCutaneous three times a day before meals  methadone    Tablet 60 milliGRAM(s) Oral daily  sodium chloride 0.9%. 1000 milliLiter(s) (100 mL/Hr) IV Continuous <Continuous>    MEDICATIONS  (PRN):  acetaminophen     Tablet .. 650 milliGRAM(s) Oral every 6 hours PRN Temp greater or equal to 38C (100.4F), Mild Pain (1 - 3)  aluminum hydroxide/magnesium hydroxide/simethicone Suspension 30 milliLiter(s) Oral every 4 hours PRN Dyspepsia  melatonin 3 milliGRAM(s) Oral at bedtime PRN Insomnia  morphine  - Injectable 4 milliGRAM(s) IV Push every 4 hours PRN Severe Pain (7 - 10)  ondansetron Injectable 4 milliGRAM(s) IV Push every 8 hours PRN Nausea and/or Vomiting    Allergies  No Known Allergies    SOCIAL HISTORY:  IVDA last use 2 months ago  FAMILY HISTORY:  none    REVIEW OF SYSTEMS:  CONSTITUTIONAL : +No weakness, no fevers or chills  EYES/ENT: No visual changes;  No vertigo or throat pain   NECK: No pain or stiffness  RESPIRATORY: No cough, wheezing, hemoptysis; No shortness of breath  CARDIOVASCULAR: No chest pain or palpitations  GASTROINTESTINAL: see HPI  GENITOURINARY: No dysuria, frequency or hematuria  NEUROLOGICAL: No numbness or weakness  SKIN: No itching, burning, rashes, or lesions   PSYCH: Normal mood and affect  All other review of systems is negative unless indicated above.  Vital Signs Last 24 Hrs  T(C): 37.3 (14 Mar 2022 11:18), Max: 37.3 (14 Mar 2022 11:18)  T(F): 99.1 (14 Mar 2022 11:18), Max: 99.1 (14 Mar 2022 11:18)  HR: 88 (14 Mar 2022 11:18) (88 - 93)  BP: 166/89 (14 Mar 2022 11:18) (158/91 - 184/96)  BP(mean): 111 (14 Mar 2022 11:18) (111 - 112)  RR: 16 (14 Mar 2022 11:18) (16 - 20)  SpO2: 100% (14 Mar 2022 11:18) (99% - 100%)    PHYSICAL EXAM:  Constitutional: chronically ill appearing, thin, frail  HEENT:, mild scleral icterus  Respiratory: CTAB  Cardiovascular: S1 and S2, RRR, no M/G/R  Gastrointestinal: BS+, firm but not tense, +guarding, nondistended, diffuse abdominal tenderness to light palpation  Extremities: No peripheral edema  Vascular: 2+ peripheral pulses  Neurological: A/O x 3, no focal deficits  Psychiatric: Normal mood, normal affect  Skin: No rashes, sacral decubitus    LABS:                        9.3    11.73 )-----------( 561      ( 14 Mar 2022 05:32 )             28.4     03-14    132<L>  |  93<L>  |  11  ----------------------------<  210<H>  3.5   |  33<H>  |  0.53    Ca    8.5      14 Mar 2022 05:32    TPro  7.8  /  Alb  1.8<L>  /  TBili  2.3<H>  /  DBili  x   /  AST  63<H>  /  ALT  91<H>  /  AlkPhos  1293<H>  03-14    PT/INR - ( 14 Mar 2022 05:32 )   PT: 14.9 sec;   INR: 1.28 ratio         PTT - ( 14 Mar 2022 05:32 )  PTT:31.4 sec  LIVER FUNCTIONS - ( 14 Mar 2022 05:32 )  Alb: 1.8 g/dL / Pro: 7.8 gm/dL / ALK PHOS: 1293 U/L / ALT: 91 U/L / AST: 63 U/L / GGT: x             RADIOLOGY & ADDITIONAL STUDIES: < from: CT Abdomen and Pelvis w/ IV Cont (03.14.22 @ 07:53) >  ACC: [id number] EXAM:  CT ABDOMEN AND PELVIS IC                          PROCEDURE DATE:  03/14/2022          INTERPRETATION:  CLINICAL INFORMATION: Pancreatitis suspected    COMPARISON: None.    CONTRAST/COMPLICATIONS:  IV Contrast: Omnipaque 350  90 cc administered   10 cc discarded  Oral Contrast: NONE  Complications: None reported at time of study completion    PROCEDURE:  CT of the Abdomen and Pelvis was performed.  Sagittal and coronal reformats were performed.    FINDINGS:  LOWER CHEST: Mild patchy groundglass opacity in the left lower lobe.    LIVER: Within normal limits.  BILE DUCTS/PANCREAS:  *Moderate intrahepatic and hepatic biliary ductal dilatation. The common   duct measures 15 mm. There is abrupt cut off of the duct in its   intrapancreatic portion.*Pancreatic duct is markedly dilated, measuring   11 mm, with abrupt cutoff in the neck due to multiple intraductal calculi.  *Additional coarse pancreatic calcifications are identified compatible   with chronic pancreatitis.  *No definite findings to suggest acute pancreatitis, however evaluation   is limited due to paucity of peripancreatic fat.  *Cystic lesions in the neck and head measuring up to 2.7 cm.  GALLBLADDER: Markedly distended and containing multiple gallstones.  SPLEEN: Within normal limits.  PANCREAS: Within normal limits.  ADRENALS: Within normal limits.  KIDNEYS/URETERS: Within normal limits.    BLADDER: Within normal limits.  REPRODUCTIVE ORGANS: Prostate within normal limits.    BOWEL: No bowel obstruction. Appendix not visualized. Enteric tube with   tip in the distal esophagus PERITONEUM: Small amount of free pelvic fluid.  VESSELS: Multiple perigastric collateral vessels are identified. The   splenic vein is patent. There is focal narrowing of the splenic vein at   the portal confluence. Atherosclerotic arterial calcifications.  RETROPERITONEUM/LYMPH NODES: No lymphadenopathy.  ABDOMINAL WALL: Anasarca.  BONES: No acute abnormality.    IMPRESSION:  Chronic pancreatitis.    Pancreatic ductaldilatation due to multiple intraductal stones.    Biliary ductal dilatation the exact etiology which could be due to post   pancreatic stricture. Consider further evaluation with MRI or endoscopic   ultrasound as an occult pancreatic mass is not excluded.    Cystic pancreatic lesions most likely representing pseudocysts.    Cholelithiasis and marked gallbladder distention, without evidence of   acute cholecystitis.    Mild left lower lobe opacity suspicious for pneumonia.    Enteric tube with tipin the distal esophagus.    Findings were discussed with Dr. Harding by Dr. Alejandra on March 14, 2022   8:40 AM.    --- End of Report ---    KING ALEJANDRA JR, MD; Attending Radiologist  This document has been electronically signed. Mar 14 2022 12:23PM    < end of copied text >   Patient is a 56y old  Male who presents with a chief complaint of abdominal pain-- transfer for possible ERCP.    HPI:  55 y/o male with hx of IVDA with heroin-- last used 2 months ago as per patient, opioid dependence on methadone, IDDM, chronic pancreatitis, and recent significant weight loss over the last 1 year who was a transfer from [facility] ([place]) for managmeent of gastric outlet obstruction and acute on chronic pancreatitis.      Patient reports he lives in a skilled nursing facility. Presented to [facility] for eval of progressively worsening abdominal pain x 1 month. He reports diffuse sharp 7/10 abdominal pain radiating into his back, worse with movement. Symptoms are constant, daily. No alleviating factors. Also notes significant weight loss, over 20 pounds during this time, due to inability to eat and pain with eating. He reports severe nausea and vomiting with eating food over the last few weeks but states he can tolerate clears. No sick contacts or travel history. No overt signs of bleeding like hematemesis, melena, hematochezia.  Denies nausea, vomiting, constipation, diarrhea. +smoking and alcohol in past.     Of note ripped out his NGT today.     PAST MEDICAL & SURGICAL HISTORY:  IVDA  Opioid dependance  Pancreatitis    MEDICATIONS  (STANDING):  dextrose 40% Gel 15 Gram(s) Oral once  dextrose 5%. 1000 milliLiter(s) (50 mL/Hr) IV Continuous <Continuous>  dextrose 5%. 1000 milliLiter(s) (100 mL/Hr) IV Continuous <Continuous>  dextrose 50% Injectable 25 Gram(s) IV Push once  dextrose 50% Injectable 12.5 Gram(s) IV Push once  dextrose 50% Injectable 25 Gram(s) IV Push once  enoxaparin Injectable 40 milliGRAM(s) SubCutaneous every 24 hours  glucagon  Injectable 1 milliGRAM(s) IntraMuscular once  insulin glargine Injectable (LANTUS) 6 Unit(s) SubCutaneous at bedtime  insulin lispro (ADMELOG) corrective regimen sliding scale   SubCutaneous three times a day before meals  insulin lispro Injectable (ADMELOG) 2 Unit(s) SubCutaneous three times a day before meals  methadone    Tablet 60 milliGRAM(s) Oral daily  sodium chloride 0.9%. 1000 milliLiter(s) (100 mL/Hr) IV Continuous <Continuous>    MEDICATIONS  (PRN):  acetaminophen     Tablet .. 650 milliGRAM(s) Oral every 6 hours PRN Temp greater or equal to 38C (100.4F), Mild Pain (1 - 3)  aluminum hydroxide/magnesium hydroxide/simethicone Suspension 30 milliLiter(s) Oral every 4 hours PRN Dyspepsia  melatonin 3 milliGRAM(s) Oral at bedtime PRN Insomnia  morphine  - Injectable 4 milliGRAM(s) IV Push every 4 hours PRN Severe Pain (7 - 10)  ondansetron Injectable 4 milliGRAM(s) IV Push every 8 hours PRN Nausea and/or Vomiting    Allergies  No Known Allergies    SOCIAL HISTORY:  IVDA last use 2 months ago  former ETOH  former smoker    FAMILY HISTORY:  no pancreatic cancer, no colon or stomach caner    REVIEW OF SYSTEMS:  CONSTITUTIONAL : +No weakness, no fevers or chills  EYES/ENT: No visual changes;  No vertigo or throat pain   NECK: No pain or stiffness  RESPIRATORY: No cough, wheezing, hemoptysis; No shortness of breath  CARDIOVASCULAR: No chest pain or palpitations  GASTROINTESTINAL: see HPI  GENITOURINARY: No dysuria, frequency or hematuria  NEUROLOGICAL: No numbness or weakness  SKIN: No itching, burning, rashes, or lesions   PSYCH: Normal mood and affect  All other review of systems is negative unless indicated above.  Vital Signs Last 24 Hrs  T(C): 37.3 (14 Mar 2022 11:18), Max: 37.3 (14 Mar 2022 11:18)  T(F): 99.1 (14 Mar 2022 11:18), Max: 99.1 (14 Mar 2022 11:18)  HR: 88 (14 Mar 2022 11:18) (88 - 93)  BP: 166/89 (14 Mar 2022 11:18) (158/91 - 184/96)  BP(mean): 111 (14 Mar 2022 11:18) (111 - 112)  RR: 16 (14 Mar 2022 11:18) (16 - 20)  SpO2: 100% (14 Mar 2022 11:18) (99% - 100%)    PHYSICAL EXAM:  Constitutional: chronically ill appearing, thin, frail  HEENT:, mild scleral icterus  Respiratory: CTAB  Cardiovascular: S1 and S2, RRR, no M/G/R  Gastrointestinal: BS+, firm but not tense,, nondistended, diffuse abdominal tenderness to light palpation, no rebound  Extremities: No peripheral edema  Vascular: 2+ peripheral pulses  Neurological: A/O x 3, no focal deficits  Psychiatric: Normal mood, normal affect  Skin: No rashes, sacral decubitus    LABS:                        9.3    11.73 )-----------( 561      ( 14 Mar 2022 05:32 )             28.4     03-14    132<L>  |  93<L>  |  11  ----------------------------<  210<H>  3.5   |  33<H>  |  0.53    Ca    8.5      14 Mar 2022 05:32    TPro  7.8  /  Alb  1.8<L>  /  TBili  2.3<H>  /  DBili  x   /  AST  63<H>  /  ALT  91<H>  /  AlkPhos  1293<H>  03-14    PT/INR - ( 14 Mar 2022 05:32 )   PT: 14.9 sec;   INR: 1.28 ratio         PTT - ( 14 Mar 2022 05:32 )  PTT:31.4 sec  LIVER FUNCTIONS - ( 14 Mar 2022 05:32 )  Alb: 1.8 g/dL / Pro: 7.8 gm/dL / ALK PHOS: 1293 U/L / ALT: 91 U/L / AST: 63 U/L / GGT: x             RADIOLOGY & ADDITIONAL STUDIES: < from: CT Abdomen and Pelvis w/ IV Cont (03.14.22 @ 07:53) >  ACC: [id number] EXAM:  CT ABDOMEN AND PELVIS IC                          PROCEDURE DATE:  03/14/2022          INTERPRETATION:  CLINICAL INFORMATION: Pancreatitis suspected    COMPARISON: None.    CONTRAST/COMPLICATIONS:  IV Contrast: Omnipaque 350  90 cc administered   10 cc discarded  Oral Contrast: NONE  Complications: None reported at time of study completion    PROCEDURE:  CT of the Abdomen and Pelvis was performed.  Sagittal and coronal reformats were performed.    FINDINGS:  LOWER CHEST: Mild patchy groundglass opacity in the left lower lobe.    LIVER: Within normal limits.  BILE DUCTS/PANCREAS:  *Moderate intrahepatic and hepatic biliary ductal dilatation. The common   duct measures 15 mm. There is abrupt cut off of the duct in its   intrapancreatic portion.*Pancreatic duct is markedly dilated, measuring   11 mm, with abrupt cutoff in the neck due to multiple intraductal calculi.  *Additional coarse pancreatic calcifications are identified compatible   with chronic pancreatitis.  *No definite findings to suggest acute pancreatitis, however evaluation   is limited due to paucity of peripancreatic fat.  *Cystic lesions in the neck and head measuring up to 2.7 cm.  GALLBLADDER: Markedly distended and containing multiple gallstones.  SPLEEN: Within normal limits.  PANCREAS: Within normal limits.  ADRENALS: Within normal limits.  KIDNEYS/URETERS: Within normal limits.    BLADDER: Within normal limits.  REPRODUCTIVE ORGANS: Prostate within normal limits.    BOWEL: No bowel obstruction. Appendix not visualized. Enteric tube with   tip in the distal esophagus PERITONEUM: Small amount of free pelvic fluid.  VESSELS: Multiple perigastric collateral vessels are identified. The   splenic vein is patent. There is focal narrowing of the splenic vein at   the portal confluence. Atherosclerotic arterial calcifications.  RETROPERITONEUM/LYMPH NODES: No lymphadenopathy.  ABDOMINAL WALL: Anasarca.  BONES: No acute abnormality.    IMPRESSION:  Chronic pancreatitis.    Pancreatic ductaldilatation due to multiple intraductal stones.    Biliary ductal dilatation the exact etiology which could be due to post   pancreatic stricture. Consider further evaluation with MRI or endoscopic   ultrasound as an occult pancreatic mass is not excluded.    Cystic pancreatic lesions most likely representing pseudocysts.    Cholelithiasis and marked gallbladder distention, without evidence of   acute cholecystitis.    Mild left lower lobe opacity suspicious for pneumonia.    Enteric tube with tipin the distal esophagus.    Findings were discussed with Dr. Harding by Dr. Alejandra on March 14, 2022   8:40 AM.    --- End of Report ---    KING ALEJANDRA JR, MD; Attending Radiologist  This document has been electronically signed. Mar 14 2022 12:23PM    < end of copied text >

## 2022-03-14 NOTE — ED ADULT NURSE NOTE - OBJECTIVE STATEMENT
55 y/o male  pt. KRISTINE transfer from [facility] in [place] for ERCP today. pt. presents to ED with PICC line in left arm. blood noted around line, IV in left forearm from [facility]. Multiple pressure wounds noted to sacral area. pt is a&ox4, breathing is even and unlabored, no distress noted at this time. pt. c/o chest pain. denies dizziness, SOB, headache, n/v/d. EKG completed. pt. placed on cardiac monitor. 57 y/o male  pt. KRISTINE transfer from [facility] in [place] for ERCP today. pt. presents to ED with PICC line in left arm. blood noted around line, IV in left forearm, NG tube from [facility]. Multiple pressure wounds noted to sacral area. pt is a&ox4, breathing is even and unlabored, no distress noted at this time. pt. c/o chest pain. denies dizziness, SOB, headache, n/v/d. EKG completed. pt. placed on cardiac monitor.

## 2022-03-14 NOTE — CONSULT NOTE ADULT - ASSESSMENT
55 y/o male with hx of IVDA with heroin last used 2 months ago as per patient, opioid dependence on methadone, IDDM, chronic pancreatitis, and recent significant weight loss over the last 1 year who was a transfer from [facility] ([place]) for possible ERCP. Patient currently having significant diffuse abdominal pain. Noted on CT scan to have chronic pancreatitis, pancreatic duct dilation, biliary ductal dilation, cystic pancreatic lesions, and cholelithiasis with gall bladder distention.    ?chronic pancreatitis vs ? choledocholithiasis vs. ?malignancy    PLAN  Keep NPO for now  EUS/ERCP, timing to be determined by Dr. Lemon  Pain control PRN  Antiemetics PRN  Supportive care    Discussed with Dr. Lemon         55 y/o male with hx of IVDA with heroin last used 2 months ago as per patient, opioid dependence on methadone, IDDM, chronic pancreatitis, and recent significant weight loss over the last 1 year who was a transfer from [facility] ([place]) for possible ERCP. Patient currently having significant diffuse abdominal pain. Noted on CT scan to have chronic pancreatitis, pancreatic duct dilation, biliary ductal dilation, cystic pancreatic lesions, and cholelithiasis with gall bladder distention.    ?chronic pancreatitis vs ? choledocholithiasis vs. ?malignancy    PLAN  Can advance to clear liquids  MRI abdomen with & without contrast  EUS/ERCP, after MRI results possibly Wednesday  Pain control PRN  Antiemetics PRN  Supportive care    Discussed with Dr. Lemon         57 y/o male with hx of IVDA with heroin last used 2 months ago as per patient, opioid dependence on methadone, IDDM, chronic pancreatitis, and recent significant weight loss over the last 1 year who was a transfer from [facility] ([place]) for management. Patient currently having significant diffuse abdominal pain. Noted on CT scan to have chronic pancreatitis, pancreatic duct dilation, biliary ductal dilation, cystic pancreatic lesions, and cholelithiasis with gall bladder distention.    ?chronic pancreatitis vs ? choledocholithiasis vs. ?malignancy    PLAN  Can advance to clear liquids  MRI abdomen with & without contrast  EUS/ERCP, after MRI results possibly Wednesday  Pain control PRN  Antiemetics PRN  Supportive care    Discussed with Dr. Lemon

## 2022-03-14 NOTE — H&P ADULT - NSHPLABSRESULTS_GEN_ALL_CORE
9.3    11.73 )-----------( 561      ( 14 Mar 2022 05:32 )             28.4     03-14    132<L>  |  93<L>  |  11  ----------------------------<  210<H>  3.5   |  33<H>  |  0.53    Ca    8.5      14 Mar 2022 05:32    TPro  7.8  /  Alb  1.8<L>  /  TBili  2.3<H>  /  DBili  x   /  AST  63<H>  /  ALT  91<H>  /  AlkPhos  1293<H>  03-14    CAPILLARY BLOOD GLUCOSE        PT/INR - ( 14 Mar 2022 05:32 )   PT: 14.9 sec;   INR: 1.28 ratio         PTT - ( 14 Mar 2022 05:32 )  PTT:31.4 sec    CXR clear

## 2022-03-14 NOTE — PHARMACOTHERAPY INTERVENTION NOTE - COMMENTS
Medication history complete, patient transferred from [facility] in [place], he lives at [facility].  Contacted NH at [phone] ([place]) to get patients paperwork sent over.  Patient also gets Methadone 60mg/day from [facility], spoke to Toshia at [phone] to verify patients dose of 60mg/day.  Patient DOES get take home doses to bring to the nursing home but unable to get information regarding how much he has at facility or if he took his dose today.    [facility]  [id number] [place] [id number]     [phone]

## 2022-03-15 LAB
A1C WITH ESTIMATED AVERAGE GLUCOSE RESULT: 9.9 % — HIGH (ref 4–5.6)
ALBUMIN SERPL ELPH-MCNC: 1.3 G/DL — LOW (ref 3.3–5)
ALP SERPL-CCNC: 985 U/L — HIGH (ref 40–120)
ALT FLD-CCNC: 59 U/L — SIGNIFICANT CHANGE UP (ref 12–78)
ANION GAP SERPL CALC-SCNC: 6 MMOL/L — SIGNIFICANT CHANGE UP (ref 5–17)
AST SERPL-CCNC: 34 U/L — SIGNIFICANT CHANGE UP (ref 15–37)
BILIRUB DIRECT SERPL-MCNC: 1.1 MG/DL — HIGH (ref 0–0.3)
BILIRUB INDIRECT FLD-MCNC: 0.2 MG/DL — SIGNIFICANT CHANGE UP (ref 0.2–1)
BILIRUB SERPL-MCNC: 1.3 MG/DL — HIGH (ref 0.2–1.2)
BUN SERPL-MCNC: 11 MG/DL — SIGNIFICANT CHANGE UP (ref 7–23)
CALCIUM SERPL-MCNC: 7.9 MG/DL — LOW (ref 8.5–10.1)
CHLORIDE SERPL-SCNC: 99 MMOL/L — SIGNIFICANT CHANGE UP (ref 96–108)
CO2 SERPL-SCNC: 28 MMOL/L — SIGNIFICANT CHANGE UP (ref 22–31)
CREAT SERPL-MCNC: 0.41 MG/DL — LOW (ref 0.5–1.3)
EGFR: 128 ML/MIN/1.73M2 — SIGNIFICANT CHANGE UP
ESTIMATED AVERAGE GLUCOSE: 237 MG/DL — HIGH (ref 68–114)
FERRITIN SERPL-MCNC: 447 NG/ML — HIGH (ref 30–400)
FOLATE SERPL-MCNC: 15.7 NG/ML — SIGNIFICANT CHANGE UP
GGT SERPL-CCNC: 1154 U/L — HIGH (ref 9–50)
GLUCOSE SERPL-MCNC: 120 MG/DL — HIGH (ref 70–99)
GLUCOSE SERPL-MCNC: 121 MG/DL — HIGH (ref 70–99)
HAV IGM SER-ACNC: SIGNIFICANT CHANGE UP
HBV CORE IGM SER-ACNC: SIGNIFICANT CHANGE UP
HBV SURFACE AG SER-ACNC: SIGNIFICANT CHANGE UP
HCT VFR BLD CALC: 23 % — LOW (ref 39–50)
HCV AB S/CO SERPL IA: 0.27 S/CO — SIGNIFICANT CHANGE UP (ref 0–0.99)
HCV AB SERPL-IMP: SIGNIFICANT CHANGE UP
HGB BLD-MCNC: 7.2 G/DL — LOW (ref 13–17)
HIV 1+2 AB+HIV1 P24 AG SERPL QL IA: SIGNIFICANT CHANGE UP
IRON SATN MFR SERPL: 25 % — SIGNIFICANT CHANGE UP (ref 16–55)
IRON SATN MFR SERPL: 38 UG/DL — LOW (ref 45–165)
MCHC RBC-ENTMCNC: 26 PG — LOW (ref 27–34)
MCHC RBC-ENTMCNC: 31.3 GM/DL — LOW (ref 32–36)
MCV RBC AUTO: 83 FL — SIGNIFICANT CHANGE UP (ref 80–100)
PLATELET # BLD AUTO: 440 K/UL — HIGH (ref 150–400)
POTASSIUM SERPL-MCNC: 3.3 MMOL/L — LOW (ref 3.5–5.3)
POTASSIUM SERPL-SCNC: 3.3 MMOL/L — LOW (ref 3.5–5.3)
PROT SERPL-MCNC: 6.5 GM/DL — SIGNIFICANT CHANGE UP (ref 6–8.3)
RBC # BLD: 2.77 M/UL — LOW (ref 4.2–5.8)
RBC # FLD: 14.4 % — SIGNIFICANT CHANGE UP (ref 10.3–14.5)
SODIUM SERPL-SCNC: 133 MMOL/L — LOW (ref 135–145)
TIBC SERPL-MCNC: 153 UG/DL — LOW (ref 220–430)
UIBC SERPL-MCNC: 115 UG/DL — SIGNIFICANT CHANGE UP (ref 110–370)
VIT B12 SERPL-MCNC: 1225 PG/ML — SIGNIFICANT CHANGE UP (ref 232–1245)
WBC # BLD: 10.1 K/UL — SIGNIFICANT CHANGE UP (ref 3.8–10.5)
WBC # FLD AUTO: 10.1 K/UL — SIGNIFICANT CHANGE UP (ref 3.8–10.5)

## 2022-03-15 PROCEDURE — 71045 X-RAY EXAM CHEST 1 VIEW: CPT | Mod: 26

## 2022-03-15 PROCEDURE — 99232 SBSQ HOSP IP/OBS MODERATE 35: CPT

## 2022-03-15 PROCEDURE — 74183 MRI ABD W/O CNTR FLWD CNTR: CPT | Mod: 26

## 2022-03-15 PROCEDURE — 99233 SBSQ HOSP IP/OBS HIGH 50: CPT

## 2022-03-15 PROCEDURE — 74018 RADEX ABDOMEN 1 VIEW: CPT | Mod: 26

## 2022-03-15 PROCEDURE — 99222 1ST HOSP IP/OBS MODERATE 55: CPT

## 2022-03-15 RX ORDER — DEXTROSE 50 % IN WATER 50 %
25 SYRINGE (ML) INTRAVENOUS ONCE
Refills: 0 | Status: COMPLETED | OUTPATIENT
Start: 2022-03-15 | End: 2022-03-15

## 2022-03-15 RX ORDER — ACETAMINOPHEN 500 MG
1000 TABLET ORAL ONCE
Refills: 0 | Status: COMPLETED | OUTPATIENT
Start: 2022-03-15 | End: 2022-03-15

## 2022-03-15 RX ORDER — SODIUM CHLORIDE 9 MG/ML
1000 INJECTION, SOLUTION INTRAVENOUS
Refills: 0 | Status: DISCONTINUED | OUTPATIENT
Start: 2022-03-15 | End: 2022-03-16

## 2022-03-15 RX ORDER — MORPHINE SULFATE 50 MG/1
2 CAPSULE, EXTENDED RELEASE ORAL ONCE
Refills: 0 | Status: DISCONTINUED | OUTPATIENT
Start: 2022-03-15 | End: 2022-03-15

## 2022-03-15 RX ORDER — ELECTROLYTE SOLUTION,INJ
1 VIAL (ML) INTRAVENOUS
Refills: 0 | Status: DISCONTINUED | OUTPATIENT
Start: 2022-03-15 | End: 2022-03-15

## 2022-03-15 RX ADMIN — METHADONE HYDROCHLORIDE 60 MILLIGRAM(S): 40 TABLET ORAL at 08:20

## 2022-03-15 RX ADMIN — Medication 400 MILLIGRAM(S): at 04:41

## 2022-03-15 RX ADMIN — MORPHINE SULFATE 4 MILLIGRAM(S): 50 CAPSULE, EXTENDED RELEASE ORAL at 20:08

## 2022-03-15 RX ADMIN — MORPHINE SULFATE 4 MILLIGRAM(S): 50 CAPSULE, EXTENDED RELEASE ORAL at 10:50

## 2022-03-15 RX ADMIN — MORPHINE SULFATE 4 MILLIGRAM(S): 50 CAPSULE, EXTENDED RELEASE ORAL at 06:27

## 2022-03-15 RX ADMIN — Medication 25 GRAM(S): at 11:29

## 2022-03-15 RX ADMIN — PANTOPRAZOLE SODIUM 10 MG/HR: 20 TABLET, DELAYED RELEASE ORAL at 18:06

## 2022-03-15 RX ADMIN — MORPHINE SULFATE 4 MILLIGRAM(S): 50 CAPSULE, EXTENDED RELEASE ORAL at 16:16

## 2022-03-15 RX ADMIN — MORPHINE SULFATE 2 MILLIGRAM(S): 50 CAPSULE, EXTENDED RELEASE ORAL at 01:39

## 2022-03-15 RX ADMIN — MORPHINE SULFATE 4 MILLIGRAM(S): 50 CAPSULE, EXTENDED RELEASE ORAL at 23:36

## 2022-03-15 RX ADMIN — MORPHINE SULFATE 4 MILLIGRAM(S): 50 CAPSULE, EXTENDED RELEASE ORAL at 02:12

## 2022-03-15 RX ADMIN — Medication 1 EACH: at 22:56

## 2022-03-15 RX ADMIN — MORPHINE SULFATE 4 MILLIGRAM(S): 50 CAPSULE, EXTENDED RELEASE ORAL at 20:38

## 2022-03-15 RX ADMIN — MORPHINE SULFATE 2 MILLIGRAM(S): 50 CAPSULE, EXTENDED RELEASE ORAL at 01:20

## 2022-03-15 RX ADMIN — SODIUM CHLORIDE 100 MILLILITER(S): 9 INJECTION, SOLUTION INTRAVENOUS at 12:03

## 2022-03-15 RX ADMIN — MORPHINE SULFATE 4 MILLIGRAM(S): 50 CAPSULE, EXTENDED RELEASE ORAL at 06:07

## 2022-03-15 RX ADMIN — MORPHINE SULFATE 4 MILLIGRAM(S): 50 CAPSULE, EXTENDED RELEASE ORAL at 02:32

## 2022-03-15 RX ADMIN — MORPHINE SULFATE 4 MILLIGRAM(S): 50 CAPSULE, EXTENDED RELEASE ORAL at 11:50

## 2022-03-15 RX ADMIN — PANTOPRAZOLE SODIUM 10 MG/HR: 20 TABLET, DELAYED RELEASE ORAL at 05:16

## 2022-03-15 RX ADMIN — Medication 1000 MILLIGRAM(S): at 05:12

## 2022-03-15 RX ADMIN — Medication 25 GRAM(S): at 16:52

## 2022-03-15 NOTE — DIETITIAN INITIAL EVALUATION ADULT. - OTHER INFO
57yo male with PMH significant for IVDA with heroin (last used 2 mnths ago), opioid dependence on methadone, IDDM, chronic pancreatitis, transferred for possible ERCP with worsening abd pain.  CT and MR showed chronic pancreatitis, gastric distention, intra/ extra hepatic biliary ductal dilatation, and fluid collection/ ? mass in the head of the pancreas compressing the SMV and proximal portal vein.  surgery now following as patient with gastric outlet obstruction 2/2 chronic pancreatitis vs pancreatic mass.  s/p NGT placement for LWS.    *d/w TRAN [place] to be started today and possible PICC line placement.

## 2022-03-15 NOTE — DIETITIAN NUTRITION RISK NOTIFICATION - TREATMENT: THE FOLLOWING DIET HAS BEEN RECOMMENDED
Diet, NPO:   Except Medications (03-14-22 @ 18:02) [Active]      Parenteral Nutrition - Adult 1 Each (75 mL/Hr) TPN Continuous <Continuous>, 03-15-22 @ 17:00, 17:00, Stop order after: 1 Days

## 2022-03-15 NOTE — PROVIDER CONTACT NOTE (HYPOGLYCEMIA EVENT) - NS PROVIDER CONTACT BACKGROUND-HYPO
Age: 56y    Gender: Male    POCT Blood Glucose:  47 mg/dL (03-15-22 @ 16:43)  45 mg/dL (03-15-22 @ 16:41)  148 mg/dL (03-15-22 @ 11:49)  37 mg/dL (03-15-22 @ 11:20)  35 mg/dL (03-15-22 @ 11:19)  193 mg/dL (03-15-22 @ 04:51)  292 mg/dL (03-14-22 @ 22:10)  219 mg/dL (03-14-22 @ 17:00)      eMAR:  dextrose 50% Injectable   25 Gram(s) IV Push (03-15-22 @ 11:29)    dextrose 50% Injectable   25 Gram(s) IV Push (03-15-22 @ 16:52)    insulin glargine Injectable (LANTUS)   6 Unit(s) SubCutaneous (03-14-22 @ 22:11)    insulin lispro (ADMELOG) corrective regimen sliding scale   2 Unit(s) SubCutaneous (03-14-22 @ 17:42)    insulin lispro Injectable (ADMELOG)   2 Unit(s) SubCutaneous (03-14-22 @ 17:43)

## 2022-03-15 NOTE — DIETITIAN INITIAL EVALUATION ADULT. - PERTINENT LABORATORY DATA
03-15    133<L>  |  99  |  11  ----------------------------<  120<H>  3.3<L>   |  28  |  0.41<L>    Ca    7.9<L>      15 Mar 2022 07:30    TPro  6.5  /  Alb  1.3<L>  /  TBili  1.3<H>  /  DBili  1.1<H>  /  AST  34  /  ALT  59  /  AlkPhos  985<H>  03-15

## 2022-03-15 NOTE — PROVIDER CONTACT NOTE (MEDICATION) - ACTION/TREATMENT ORDERED:
Run D5 with NS at 50mL/hr during blood administration. Resume rate of 100mL/hr when both units PRBC are done. PPN will start tonight as well.

## 2022-03-15 NOTE — PROGRESS NOTE ADULT - SUBJECTIVE AND OBJECTIVE BOX
Patient is a 56y old  Male who presents with a chief complaint of abdominal pain-- transfer for possible ERCP.    HPI: 55 y/o male transferred from [facility]) for abdominal pain. No acute events overnight, reports continued sharp stabbing upper abdominal pain. Was somewhat unable to tolerate clear liquids. Notes he had vomiting, and persistent nausea. Asked for a soft diet.     MEDICATIONS  (STANDING):  dextrose 40% Gel 15 Gram(s) Oral once  dextrose 5%. 1000 milliLiter(s) (50 mL/Hr) IV Continuous <Continuous>  dextrose 5%. 1000 milliLiter(s) (100 mL/Hr) IV Continuous <Continuous>  dextrose 50% Injectable 25 Gram(s) IV Push once  dextrose 50% Injectable 12.5 Gram(s) IV Push once  dextrose 50% Injectable 25 Gram(s) IV Push once  folic acid 1 milliGRAM(s) Oral daily  glucagon  Injectable 1 milliGRAM(s) IntraMuscular once  insulin glargine Injectable (LANTUS) 6 Unit(s) SubCutaneous at bedtime  insulin lispro (ADMELOG) corrective regimen sliding scale   SubCutaneous three times a day before meals  insulin lispro Injectable (ADMELOG) 2 Unit(s) SubCutaneous three times a day before meals  methadone    Tablet 60 milliGRAM(s) Oral daily  multivitamin 1 Tablet(s) Oral daily  pancrelipase (ZENPEP 20,000 Lipase Units) 1 Capsule(s) Oral three times a day with meals  pantoprazole Infusion 8 mG/Hr (10 mL/Hr) IV Continuous <Continuous>  sodium chloride 0.9%. 1000 milliLiter(s) (100 mL/Hr) IV Continuous <Continuous>  thiamine 100 milliGRAM(s) Oral daily    MEDICATIONS  (PRN):  acetaminophen     Tablet .. 650 milliGRAM(s) Oral every 6 hours PRN Temp greater or equal to 38C (100.4F), Mild Pain (1 - 3)  ALBUTerol    90 MICROgram(s) HFA Inhaler 2 Puff(s) Inhalation every 6 hours PRN Bronchospasm  aluminum hydroxide/magnesium hydroxide/simethicone Suspension 30 milliLiter(s) Oral every 4 hours PRN Dyspepsia  melatonin 3 milliGRAM(s) Oral at bedtime PRN Insomnia  morphine  - Injectable 4 milliGRAM(s) IV Push every 4 hours PRN Severe Pain (7 - 10)  ondansetron Injectable 4 milliGRAM(s) IV Push every 8 hours PRN Nausea and/or Vomiting    Vital Signs Last 24 Hrs  T(C): 36.6 (15 Mar 2022 08:30), Max: 37.3 (14 Mar 2022 11:18)  T(F): 97.9 (15 Mar 2022 08:30), Max: 99.1 (14 Mar 2022 11:18)  HR: 70 (15 Mar 2022 08:30) (70 - 96)  BP: 134/68 (15 Mar 2022 08:30) (131/67 - 172/88)  BP(mean): 111 (14 Mar 2022 11:18) (111 - 111)  RR: 18 (15 Mar 2022 08:30) (16 - 18)  SpO2: 100% (15 Mar 2022 08:30) (100% - 100%)    PHYSICAL EXAM:  Constitutional: chronically ill appearing, thin, frail  HEENT: mild scleral icterus  Respiratory: CTAB  Cardiovascular: S1 and S2, RRR, no M/G/R  Gastrointestinal: BS+, firm but not tense,, nondistended, diffuse abdominal tenderness to light palpation, no rebound  Extremities: No peripheral edema  Vascular: 2+ peripheral pulses  Neurological: A/O x 3, no focal deficits  Psychiatric: Normal mood, normal affect  Skin: No rashes, sacral decubitus    LABS:                        7.2    10.10 )-----------( 440      ( 15 Mar 2022 07:30 )             23.0     03-15    133<L>  |  99  |  11  ----------------------------<  120<H>  3.3<L>   |  28  |  0.41<L>    Ca    7.9<L>      15 Mar 2022 07:30    TPro  6.5  /  Alb  1.3<L>  /  TBili  1.3<H>  /  DBili  1.1<H>  /  AST  34  /  ALT  59  /  AlkPhos  985<H>  03-15    PT/INR - ( 14 Mar 2022 05:32 )   PT: 14.9 sec;   INR: 1.28 ratio         PTT - ( 14 Mar 2022 05:32 )  PTT:31.4 sec  LIVER FUNCTIONS - ( 15 Mar 2022 07:30 )  Alb: 1.3 g/dL / Pro: 6.5 gm/dL / ALK PHOS: 985 U/L / ALT: 59 U/L / AST: 34 U/L / GGT: x             RADIOLOGY & ADDITIONAL STUDIES: reviewed Patient is a 56y old  Male who presents with a chief complaint of abdominal pain-- transfer for possible ERCP. Follow up for chronic pancreatitis, pain.     HPI: 57 y/o male transferred from [facility]) for abdominal pain. No acute events overnight, reports continued sharp stabbing upper abdominal pain. Was somewhat unable to tolerate clear liquids. Notes he had vomiting, and persistent nausea. Asked for a soft diet despite this.     MEDICATIONS  (STANDING):  dextrose 40% Gel 15 Gram(s) Oral once  dextrose 5%. 1000 milliLiter(s) (50 mL/Hr) IV Continuous <Continuous>  dextrose 5%. 1000 milliLiter(s) (100 mL/Hr) IV Continuous <Continuous>  dextrose 50% Injectable 25 Gram(s) IV Push once  dextrose 50% Injectable 12.5 Gram(s) IV Push once  dextrose 50% Injectable 25 Gram(s) IV Push once  folic acid 1 milliGRAM(s) Oral daily  glucagon  Injectable 1 milliGRAM(s) IntraMuscular once  insulin glargine Injectable (LANTUS) 6 Unit(s) SubCutaneous at bedtime  insulin lispro (ADMELOG) corrective regimen sliding scale   SubCutaneous three times a day before meals  insulin lispro Injectable (ADMELOG) 2 Unit(s) SubCutaneous three times a day before meals  methadone    Tablet 60 milliGRAM(s) Oral daily  multivitamin 1 Tablet(s) Oral daily  pancrelipase (ZENPEP 20,000 Lipase Units) 1 Capsule(s) Oral three times a day with meals  pantoprazole Infusion 8 mG/Hr (10 mL/Hr) IV Continuous <Continuous>  sodium chloride 0.9%. 1000 milliLiter(s) (100 mL/Hr) IV Continuous <Continuous>  thiamine 100 milliGRAM(s) Oral daily    MEDICATIONS  (PRN):  acetaminophen     Tablet .. 650 milliGRAM(s) Oral every 6 hours PRN Temp greater or equal to 38C (100.4F), Mild Pain (1 - 3)  ALBUTerol    90 MICROgram(s) HFA Inhaler 2 Puff(s) Inhalation every 6 hours PRN Bronchospasm  aluminum hydroxide/magnesium hydroxide/simethicone Suspension 30 milliLiter(s) Oral every 4 hours PRN Dyspepsia  melatonin 3 milliGRAM(s) Oral at bedtime PRN Insomnia  morphine  - Injectable 4 milliGRAM(s) IV Push every 4 hours PRN Severe Pain (7 - 10)  ondansetron Injectable 4 milliGRAM(s) IV Push every 8 hours PRN Nausea and/or Vomiting    Vital Signs Last 24 Hrs  T(C): 36.6 (15 Mar 2022 08:30), Max: 37.3 (14 Mar 2022 11:18)  T(F): 97.9 (15 Mar 2022 08:30), Max: 99.1 (14 Mar 2022 11:18)  HR: 70 (15 Mar 2022 08:30) (70 - 96)  BP: 134/68 (15 Mar 2022 08:30) (131/67 - 172/88)  BP(mean): 111 (14 Mar 2022 11:18) (111 - 111)  RR: 18 (15 Mar 2022 08:30) (16 - 18)  SpO2: 100% (15 Mar 2022 08:30) (100% - 100%)    PHYSICAL EXAM:  Constitutional: chronically ill appearing, thin, frail  HEENT: , EOMI, MMM  Respiratory: CTAB  Cardiovascular: S1 and S2, RRR, no M/G/R  Gastrointestinal: BS+, nondistended, diffuse abdominal tenderness to light palpation, no rebound  Extremities: No peripheral edema  Vascular: 2+ peripheral pulses  Neurological: A/O x 3, no focal deficits  Psychiatric: Normal mood, normal affect  Skin: No rashes, sacral decubitus    LABS:                        7.2    10.10 )-----------( 440      ( 15 Mar 2022 07:30 )             23.0     03-15    133<L>  |  99  |  11  ----------------------------<  120<H>  3.3<L>   |  28  |  0.41<L>    Ca    7.9<L>      15 Mar 2022 07:30    TPro  6.5  /  Alb  1.3<L>  /  TBili  1.3<H>  /  DBili  1.1<H>  /  AST  34  /  ALT  59  /  AlkPhos  985<H>  03-15    PT/INR - ( 14 Mar 2022 05:32 )   PT: 14.9 sec;   INR: 1.28 ratio         PTT - ( 14 Mar 2022 05:32 )  PTT:31.4 sec  LIVER FUNCTIONS - ( 15 Mar 2022 07:30 )  Alb: 1.3 g/dL / Pro: 6.5 gm/dL / ALK PHOS: 985 U/L / ALT: 59 U/L / AST: 34 U/L / GGT: x             RADIOLOGY & ADDITIONAL STUDIES: reviewed, likely mass lesion in HOP

## 2022-03-15 NOTE — DIETITIAN INITIAL EVALUATION ADULT. - ORAL INTAKE PTA/DIET HISTORY
0-1 meals/day due to no appetite and abd pain.  meeting <50% of estimated nutr needs chronically x 1 yr.

## 2022-03-15 NOTE — DIETITIAN INITIAL EVALUATION ADULT. - PHYSCIAL ASSESSMENT
underweight/emaciated/debilitated khushboo score of 15; stage 3 PU on sacrum.  (+) flatus, (+) abd pain.

## 2022-03-15 NOTE — DIETITIAN INITIAL EVALUATION ADULT. - MALNUTRITION
severe malnutrition in acute on chronic illness r/t decreased PO intake 2/2 altered GI function (GOO) on pancreatitis AEB 55% wt loss x 1 yr; meeting <50% of ENN chronically; severe muscle/fat wasting severe malnutrition in acute on chronic illness

## 2022-03-15 NOTE — DIETITIAN INITIAL EVALUATION ADULT. - PERTINENT MEDS FT
MEDICATIONS  (STANDING):  dextrose 40% Gel 15 Gram(s) Oral once  dextrose 5%. 1000 milliLiter(s) (50 mL/Hr) IV Continuous <Continuous>  dextrose 5%. 1000 milliLiter(s) (100 mL/Hr) IV Continuous <Continuous>  dextrose 50% Injectable 25 Gram(s) IV Push once  dextrose 50% Injectable 12.5 Gram(s) IV Push once  dextrose 50% Injectable 25 Gram(s) IV Push once  folic acid 1 milliGRAM(s) Oral daily  glucagon  Injectable 1 milliGRAM(s) IntraMuscular once  insulin glargine Injectable (LANTUS) 6 Unit(s) SubCutaneous at bedtime  insulin lispro (ADMELOG) corrective regimen sliding scale   SubCutaneous three times a day before meals  insulin lispro Injectable (ADMELOG) 2 Unit(s) SubCutaneous three times a day before meals  methadone    Tablet 60 milliGRAM(s) Oral daily  multivitamin 1 Tablet(s) Oral daily  pancrelipase (ZENPEP 20,000 Lipase Units) 1 Capsule(s) Oral three times a day with meals  pantoprazole Infusion 8 mG/Hr (10 mL/Hr) IV Continuous <Continuous>  sodium chloride 0.9%. 1000 milliLiter(s) (100 mL/Hr) IV Continuous <Continuous>  thiamine 100 milliGRAM(s) Oral daily    MEDICATIONS  (PRN):  acetaminophen     Tablet .. 650 milliGRAM(s) Oral every 6 hours PRN Temp greater or equal to 38C (100.4F), Mild Pain (1 - 3)  ALBUTerol    90 MICROgram(s) HFA Inhaler 2 Puff(s) Inhalation every 6 hours PRN Bronchospasm  aluminum hydroxide/magnesium hydroxide/simethicone Suspension 30 milliLiter(s) Oral every 4 hours PRN Dyspepsia  melatonin 3 milliGRAM(s) Oral at bedtime PRN Insomnia  morphine  - Injectable 4 milliGRAM(s) IV Push every 4 hours PRN Severe Pain (7 - 10)  ondansetron Injectable 4 milliGRAM(s) IV Push every 8 hours PRN Nausea and/or Vomiting

## 2022-03-15 NOTE — PROVIDER CONTACT NOTE (MEDICATION) - ASSESSMENT
Pt frail with fluctuating blood glucose. However, want to avoid fluid overload during blood administration.

## 2022-03-15 NOTE — CONSULT NOTE ADULT - SUBJECTIVE AND OBJECTIVE BOX
57y/o male pmhx IVDA, HTN, DM, EtOH abuse, pancreatitis transferred from outside hospital with intermittent abdominal pain for roughly 1 year, with worsening pain over the last month radiating to back.  Patient had coffee ground emesis at OSH, NGT was placed and he was transferred to   GI evaluation.  Patient resides in SNF, states he has lost roughly 20 lbs over the last few months, nausea and vomiting.  Patient pulled NGT upon presenting to .  CT abdomen/pelvis performed showing chornic pancreatitis, multiple stones within pancreatic duct as well as pancreatic and ductal dilatation. Cystic pancreatic lesion consistent with pseudocyst also visualized. Patient currently complains of abdominal pain with nausea. Denies fever, chills, chest pain, sob, diarrhea.     PMHx: as above   SOCIAL HISTORY:  IVDA last use 2 months ago  former ETOH  former smoker  No family history of pancreatic cancer       Physical Exam:   General: AAOx3, malnourished, cachectic  Cardio: RRR, S1, S2   Pulmonary: equal chest rise B/L   Abdomen: soft, TTP epigastric, mildly distended      57y/o male pmhx IVDA, HTN, DM, EtOH abuse, pancreatitis transferred from outside hospital with intermittent abdominal pain for roughly 1 year, with worsening pain over the last month radiating to back.  Patient had coffee ground emesis at OSH, NGT was placed and he was transferred to   GI evaluation.  Patient resides in [facility], states he has lost roughly 20 lbs over the last few months, nausea and vomiting.  Patient pulled NGT upon presenting to .  CT abdomen/pelvis performed showing chornic pancreatitis, multiple stones within pancreatic duct as well as pancreatic and ductal dilatation. Cystic pancreatic lesion consistent with pseudocyst also visualized. Patient currently complains of abdominal pain with nausea. Denies fever, chills, chest pain, sob, diarrhea.     PMHx: as above   SOCIAL HISTORY:  IVDA last use 2 months ago  former ETOH  former smoker  No family history of pancreatic cancer     Vital Signs (24 Hrs):  T(C): 36.6 (03-15-22 @ 08:30), Max: 37.3 (03-14-22 @ 11:18)  HR: 70 (03-15-22 @ 08:30) (70 - 96)  BP: 134/68 (03-15-22 @ 08:30) (131/67 - 172/88)  RR: 18 (03-15-22 @ 08:30) (16 - 18)  SpO2: 100% (03-15-22 @ 08:30) (100% - 100%)  Wt(kg): --  Daily     Daily     I&O's Summary    14 Mar 2022 07:01  -  15 Mar 2022 07:00  --------------------------------------------------------  IN: 1420 mL / OUT: 1650 mL / NET: -230 mL        Physical Exam:   General: AAOx3, malnourished, cachectic  Cardio: RRR, S1, S2   Pulmonary: equal chest rise B/L   Abdomen: soft, TTP epigastric, mildly distended     .  LABS:                         7.2    10.10 )-----------( 440      ( 15 Mar 2022 07:30 )             23.0     03-15    133<L>  |  99  |  11  ----------------------------<  120<H>  3.3<L>   |  28  |  0.41<L>    Ca    7.9<L>      15 Mar 2022 07:30    TPro  6.5  /  Alb  1.3<L>  /  TBili  1.3<H>  /  DBili  1.1<H>  /  AST  34  /  ALT  59  /  AlkPhos  985<H>  03-15    PT/INR - ( 14 Mar 2022 05:32 )   PT: 14.9 sec;   INR: 1.28 ratio         PTT - ( 14 Mar 2022 05:32 )  PTT:31.4 sec          RADIOLOGY, EKG & ADDITIONAL TESTS: Reviewed.   < from: CT Abdomen and Pelvis w/ IV Cont (03.14.22 @ 07:53) >  IMPRESSION:  Chronic pancreatitis.    Pancreatic ductaldilatation due to multiple intraductal stones.    Biliary ductal dilatation the exact etiology which could be due to post   pancreatic stricture. Consider further evaluation with MRI or endoscopic   ultrasound as an occult pancreatic mass is not excluded.    Cystic pancreatic lesions most likely representing pseudocysts.    Cholelithiasis and marked gallbladder distention, without evidence of   acute cholecystitis.    Mild left lower lobe opacity suspicious for pneumonia.    Enteric tube with tipin the distal esophagus.    Findings were discussed with Dr. Harding by Dr. Newsome on March 14, 2022   8:40 AM.    --- End of Report ---    < end of copied text >

## 2022-03-15 NOTE — CHART NOTE - NSCHARTNOTEFT_GEN_A_CORE
Clinical Nutrition PPN Rec'd Note    *see initial assessment note for complete information*    ESTIMATED NUTR NEEDS: based on bedscale wt on 3/15 (39Kg)  1365-1560Kcal (35-40Kcal/Kg)  70-78 g protein (1.8-2g/Kg protein)  0132-6623 mL (35-40mL/Kg)    PPN RECOMMENDATIONS:  1800 mL total volume via peripheral line    65g Amino Acids  100g Dextrose  0g Lipids 20% (obtain triglyceride level)  98 mEq NaCl  14 mEq NaAcetate  20 mMol NaPhos  40 mEq KCl  25 mEq KAcetate  0 mMol KPhos  0 mEq Calcium Gluconate  8 mEq Magnesium Sulfate  100 mg Thiamine  5 units regular insulin  1 mL trace elements  10 mL MVI    total calories: 600Kcal (meets ~44% of estimated calorie needs and ~93% of estimated protein needs) osmolarity 889    ADDITIONAL RECOMENDATIONS:  1) strict I/O's  2) POCT q6hrs; maintain 140-160mg/dL: add sliding scale  3) daily lyte checks with magnesium and phos  4) obtain triglyceride and LFT  5) daily wt checks to track/trend changes  6) consider PICC line placement for TPN    *will monitor and adjust treatement plan prn*  Angela Courtney MA, RDN, CDN, CNSC [phone]

## 2022-03-15 NOTE — PROGRESS NOTE ADULT - SUBJECTIVE AND OBJECTIVE BOX
CC- abdominal pain-- transfer for possible ERCP (15 Mar 2022 10:22)    HPI:  57 y/o male with PMHX of IVDA with heroin-- last used 2 months ago as per patient, opioid dependence on methadone, IDDM, chronic pancreatitis, and recent significant weight loss over the last 1 year who was a transfer from [facility] for possible ERCP.  Patient initially presented to outside hospital for worsening abdominal pain.  Patient had CT/ MRI/ US of the abdomen which showed chronic pancreatitis, gastric distention (for which he had an NGT),  intra/ extra hepatic biliary ductal dilatation, and fluid collection/ ? mass in the head of the pancreas compressing the SMV and proximal portal vein.  Patient was transferred to  for possible need for ERCP and further GI evaluation.  Upon admission, patient with significantly elevated ALKPHOS 1200.        PAST MEDICAL & SURGICAL HISTORY:  IVDA  Opioid dependance  Pancreatitis    FAMILY HISTORY:  Unable to obtain from patient at this time.    Social History:    Previously living in SNF.    Transfer from [facility].    +Drug use-- IVDA / heroin/ fentanyl.    No tob use.      Allergies:  No Known Allergies   (14 Mar 2022 11:54)    3/15/22- events of last night noted. Had coffee-ground emesis and started on Protonix drip. NGT to suction. NPO    Review of system- All 10 systems reviewed and is as per HPI otherwise negative.     T(C): 36.6 (03-15-22 @ 16:01), Max: 37.1 (03-15-22 @ 00:25)  HR: 75 (03-15-22 @ 16:01) (70 - 87)  BP: 143/72 (03-15-22 @ 16:01) (131/67 - 150/74)  RR: 18 (03-15-22 @ 16:01) (16 - 18)  SpO2: 100% (03-15-22 @ 16:01) (100% - 100%)  Wt(kg): --    LABS:                        7.2    10.10 )-----------( 440      ( 15 Mar 2022 07:30 )             23.0     03-15    x   |  x   |  x   ----------------------------<  121<H>  x    |  x   |  x     Ca    7.9<L>      15 Mar 2022 07:30    TPro  6.5  /  Alb  1.3<L>  /  TBili  1.3<H>  /  DBili  1.1<H>  /  AST  34  /  ALT  59  /  AlkPhos  985<H>  03-15    PT/INR - ( 14 Mar 2022 05:32 )   PT: 14.9 sec;   INR: 1.28 ratio       PTT - ( 14 Mar 2022 05:32 )  PTT:31.4 sec    CAPILLARY BLOOD GLUCOSE  POCT Blood Glucose.: 148 mg/dL (15 Mar 2022 11:49)  POCT Blood Glucose.: 37 mg/dL (15 Mar 2022 11:20)  POCT Blood Glucose.: 35 mg/dL (15 Mar 2022 11:19)  POCT Blood Glucose.: 193 mg/dL (15 Mar 2022 04:51)  POCT Blood Glucose.: 292 mg/dL (14 Mar 2022 22:10)  POCT Blood Glucose.: 219 mg/dL (14 Mar 2022 17:00)    RADIOLOGY & ADDITIONAL TESTS:  CT ABDOMEN AND PELVIS IC                        PROCEDURE DATE:  03/14/2022      INTERPRETATION:  CLINICAL INFORMATION: Pancreatitis suspected    COMPARISON: None.    CONTRAST/COMPLICATIONS:  IV Contrast: Omnipaque 350  90 cc administered   10 cc discarded  Oral Contrast: NONE  Complications: None reported at time of study completion    PROCEDURE:  CT of the Abdomen and Pelvis was performed.  Sagittal and coronal reformats were performed.    FINDINGS:  LOWER CHEST: Mild patchy groundglass opacity in the left lower lobe.    LIVER: Within normal limits.  BILE DUCTS/PANCREAS:  *Moderate intrahepatic and hepatic biliary ductal dilatation. The common   duct measures 15 mm. There is abrupt cut off of the duct in its   intrapancreatic portion.*Pancreatic duct is markedly dilated, measuring   11 mm, with abrupt cutoff in the neck due to multiple intraductal calculi.  *Additional coarse pancreatic calcifications are identified compatible   with chronic pancreatitis.  *No definite findings to suggest acute pancreatitis, however evaluation   is limited due to paucity of peripancreatic fat.  *Cystic lesions in the neck and head measuring up to 2.7 cm.  GALLBLADDER: Markedly distended and containing multiple gallstones.  SPLEEN: Within normal limits.  PANCREAS: Within normal limits.  ADRENALS: Within normal limits.  KIDNEYS/URETERS: Within normal limits.    BLADDER: Within normal limits.  REPRODUCTIVE ORGANS: Prostate within normal limits.    BOWEL: No bowel obstruction. Appendix not visualized. Enteric tube with   tip in the distal esophagus PERITONEUM: Small amount of free pelvic fluid.  VESSELS: Multiple perigastric collateral vessels are identified. The   splenic vein is patent. There is focal narrowing of the splenic vein at   the portal confluence. Atherosclerotic arterial calcifications.  RETROPERITONEUM/LYMPH NODES: No lymphadenopathy.  ABDOMINAL WALL: Anasarca.  BONES: No acute abnormality.    IMPRESSION:  Chronic pancreatitis.    Pancreatic ductal dilatation due to multiple intraductal stones.    Biliary ductal dilatation the exact etiology which could be due to post   pancreatic stricture. Consider further evaluation with MRI or endoscopic   ultrasound as an occult pancreatic mass is not excluded.    Cystic pancreatic lesions most likely representing pseudocysts.    Cholelithiasis and marked gallbladder distention, without evidence of   acute cholecystitis.    Mild left lower lobe opacity suspicious for pneumonia.    Enteric tube with tip in the distal esophagus.    PHYSICAL EXAM:  GENERAL: cechectic, NAD  HEAD:  Atraumatic, Normocephalic  EYES: EOMI, PERRLA, conjunctiva and sclera clear  HEENT: NGT+  NECK: Supple, No JVD  NERVOUS SYSTEM:  Alert & Oriented X2, moves all extremities  CHEST/LUNG: Clear to auscultation bilaterally; No rales, rhonchi, wheezing, or rubs  HEART: Regular rate and rhythm; No murmurs, rubs, or gallops  ABDOMEN: Soft, diffusely sensitive to palpations, +BS  GENITOURINARY- Voiding, no palpable bladder  EXTREMITIES:  2+ Peripheral Pulses, No clubbing, cyanosis, or edema  MUSCULOSKELTAL- No muscle tenderness, Muscle tone normal, No joint tenderness, no Joint swelling, Joint range of motion-normal  SKIN-no rash, no lesion    MEDICATIONS  (STANDING):  dextrose 40% Gel 15 Gram(s) Oral once  dextrose 5% + sodium chloride 0.9%. 1000 milliLiter(s) (100 mL/Hr) IV Continuous <Continuous>  dextrose 5%. 1000 milliLiter(s) (50 mL/Hr) IV Continuous <Continuous>  dextrose 5%. 1000 milliLiter(s) (100 mL/Hr) IV Continuous <Continuous>  dextrose 50% Injectable 25 Gram(s) IV Push once  dextrose 50% Injectable 12.5 Gram(s) IV Push once  dextrose 50% Injectable 25 Gram(s) IV Push once  folic acid 1 milliGRAM(s) Oral daily  glucagon  Injectable 1 milliGRAM(s) IntraMuscular once  insulin glargine Injectable (LANTUS) 6 Unit(s) SubCutaneous at bedtime  insulin lispro (ADMELOG) corrective regimen sliding scale   SubCutaneous three times a day before meals  insulin lispro Injectable (ADMELOG) 2 Unit(s) SubCutaneous three times a day before meals  methadone    Tablet 60 milliGRAM(s) Oral daily  multivitamin 1 Tablet(s) Oral daily  pancrelipase (ZENPEP 20,000 Lipase Units) 1 Capsule(s) Oral three times a day with meals  pantoprazole Infusion 8 mG/Hr (10 mL/Hr) IV Continuous <Continuous>  Parenteral Nutrition - Adult 1 Each (75 mL/Hr) TPN Continuous <Continuous>  thiamine 100 milliGRAM(s) Oral daily    MEDICATIONS  (PRN):  acetaminophen     Tablet .. 650 milliGRAM(s) Oral every 6 hours PRN Temp greater or equal to 38C (100.4F), Mild Pain (1 - 3)  ALBUTerol    90 MICROgram(s) HFA Inhaler 2 Puff(s) Inhalation every 6 hours PRN Bronchospasm  aluminum hydroxide/magnesium hydroxide/simethicone Suspension 30 milliLiter(s) Oral every 4 hours PRN Dyspepsia  melatonin 3 milliGRAM(s) Oral at bedtime PRN Insomnia  morphine  - Injectable 4 milliGRAM(s) IV Push every 4 hours PRN Severe Pain (7 - 10)  ondansetron Injectable 4 milliGRAM(s) IV Push every 8 hours PRN Nausea and/or Vomiting    Assessment/Plan  57 y/o male with PMHX of IVDA with heroin/ fentanyl-- last used 2 months ago as per patient, opioid dependence on methadone, IDDM, chronic pancreatitis, and recent significant weight loss over the last 1 year who was a transfer from [facility] ([place] for possible ERCP after being found to have ? mass on MRI abdomen with chronic pancreatitis.      #Abd pain/ Chronic Pancreatitis/ Choledocholithiasis/ Biliary ductal dilatation/ possible pancreatic mass  NPO, IVF  Surg onc eval DR Ordaz appreciated  Started on PPN  NGT to suction  Pain meds prn  For EGD/EUS/ ?celiac block  tomorrow with DR Lemon  MRI abdomen done, report pending  CT abd- pancreatic duct dilation with stones    #UGI bleeding  #Anemia acute blood loss on chronic  Cont Protonix drip  Transfuse 2 Units PRBC today  Monitor HH  For EGD tomorrow    #Weight Loss:    HIV/hepatitis panel negative    #IVDA/ Opioid Dependence:    Patient was on methadone 60 mg daily as per notes.    Now NPO receiving IV morphine.    Will adjust after discussing with GI.      #IDDM with hypoglycemic episode   Glu earlier today in the 30's- responded to D50  Will hold Lantus tonight  Started on D5NS IVF  Adjust further based on BGM's     #Severe Protein Calorie Malnutrition/ Cachexia:      On PPN    #DVT Proph:  venodynes    #Dispo- for endoscopy tomorrow.

## 2022-03-15 NOTE — PROVIDER CONTACT NOTE (MEDICATION) - SITUATION
Pt has D5% with NS running at 100mL and protonix drip. Pause IVF during blood administration to avoid fluid overload?

## 2022-03-15 NOTE — PROGRESS NOTE ADULT - ASSESSMENT
57 y/o male with hx of IVDA with heroin last used 2 months ago as per patient, opioid dependence on methadone, IDDM, chronic pancreatitis, and recent significant weight loss over the last 1 year who was a transfer from [facility] ([place]) for management. Patient continues to have significant diffuse abdominal pain. Noted on CT scan to have chronic pancreatitis, pancreatic duct dilation, biliary ductal dilation, cystic pancreatic lesions, and cholelithiasis with gall bladder distention.    ?chronic pancreatitis vs ? choledocholithiasis vs. ?malignancy    PLAN  Will keep NPO for now due to n/v  Pending MRI abdomen with & without contrast  EUS/ERCP, after MRI results, timing to be determined by Dr. Lemon  Pain control PRN  Antiemetics PRN  Supportive care  Recommend surgical/oncology consult    Discussed with Dr. Lemon

## 2022-03-15 NOTE — CONSULT NOTE ADULT - ASSESSMENT
57y/o M pmhx IVDA, EtOH abuse, DM, HTN presents with abdominal pain and coffee ground emesis with CT findings of double duct sign and GOO 2/2 chronic pancreatitis vs pancreatic mass. NGT placed at bedside, scant coffee ground output.    Plan:   - pain control prn  - monitor VS   - NPO   - NGT LIWS   - continue PPI   - PPN   - Consider placing PICC line for TPN   - f/u MRCP   - f/u GI   - Surgical oncology will follow     Case discussed with Surgical Oncology attendings

## 2022-03-16 ENCOUNTER — RESULT REVIEW (OUTPATIENT)
Age: 57
End: 2022-03-16

## 2022-03-16 DIAGNOSIS — K86.89 OTHER SPECIFIED DISEASES OF PANCREAS: ICD-10-CM

## 2022-03-16 LAB
ALBUMIN SERPL ELPH-MCNC: 1.7 G/DL — LOW (ref 3.3–5)
ALP SERPL-CCNC: 1045 U/L — HIGH (ref 40–120)
ALT FLD-CCNC: 53 U/L — SIGNIFICANT CHANGE UP (ref 12–78)
ANION GAP SERPL CALC-SCNC: 3 MMOL/L — LOW (ref 5–17)
AST SERPL-CCNC: 33 U/L — SIGNIFICANT CHANGE UP (ref 15–37)
BILIRUB SERPL-MCNC: 1.8 MG/DL — HIGH (ref 0.2–1.2)
BUN SERPL-MCNC: 11 MG/DL — SIGNIFICANT CHANGE UP (ref 7–23)
CALCIUM SERPL-MCNC: 8.2 MG/DL — LOW (ref 8.5–10.1)
CHLORIDE SERPL-SCNC: 101 MMOL/L — SIGNIFICANT CHANGE UP (ref 96–108)
CO2 SERPL-SCNC: 29 MMOL/L — SIGNIFICANT CHANGE UP (ref 22–31)
CREAT SERPL-MCNC: 0.48 MG/DL — LOW (ref 0.5–1.3)
EGFR: 122 ML/MIN/1.73M2 — SIGNIFICANT CHANGE UP
GLUCOSE SERPL-MCNC: 150 MG/DL — HIGH (ref 70–99)
HCT VFR BLD CALC: 32.3 % — LOW (ref 39–50)
HGB BLD-MCNC: 10.5 G/DL — LOW (ref 13–17)
MAGNESIUM SERPL-MCNC: 1.8 MG/DL — SIGNIFICANT CHANGE UP (ref 1.6–2.6)
MCHC RBC-ENTMCNC: 26.6 PG — LOW (ref 27–34)
MCHC RBC-ENTMCNC: 32.5 GM/DL — SIGNIFICANT CHANGE UP (ref 32–36)
MCV RBC AUTO: 81.8 FL — SIGNIFICANT CHANGE UP (ref 80–100)
PHOSPHATE SERPL-MCNC: 2.4 MG/DL — LOW (ref 2.5–4.5)
PLATELET # BLD AUTO: 444 K/UL — HIGH (ref 150–400)
POTASSIUM SERPL-MCNC: 3.6 MMOL/L — SIGNIFICANT CHANGE UP (ref 3.5–5.3)
POTASSIUM SERPL-SCNC: 3.6 MMOL/L — SIGNIFICANT CHANGE UP (ref 3.5–5.3)
PROT SERPL-MCNC: 7.1 GM/DL — SIGNIFICANT CHANGE UP (ref 6–8.3)
RBC # BLD: 3.95 M/UL — LOW (ref 4.2–5.8)
RBC # FLD: 14 % — SIGNIFICANT CHANGE UP (ref 10.3–14.5)
SODIUM SERPL-SCNC: 133 MMOL/L — LOW (ref 135–145)
TRIGL SERPL-MCNC: 40 MG/DL — SIGNIFICANT CHANGE UP
WBC # BLD: 10.54 K/UL — HIGH (ref 3.8–10.5)
WBC # FLD AUTO: 10.54 K/UL — HIGH (ref 3.8–10.5)

## 2022-03-16 PROCEDURE — 88307 TISSUE EXAM BY PATHOLOGIST: CPT | Mod: 26

## 2022-03-16 PROCEDURE — 43246 EGD PLACE GASTROSTOMY TUBE: CPT

## 2022-03-16 PROCEDURE — 99498 ADVNCD CARE PLAN ADDL 30 MIN: CPT

## 2022-03-16 PROCEDURE — 99232 SBSQ HOSP IP/OBS MODERATE 35: CPT

## 2022-03-16 PROCEDURE — 43274 ERCP DUCT STENT PLACEMENT: CPT

## 2022-03-16 PROCEDURE — 99253 IP/OBS CNSLTJ NEW/EST LOW 45: CPT

## 2022-03-16 PROCEDURE — 99497 ADVNCD CARE PLAN 30 MIN: CPT

## 2022-03-16 PROCEDURE — 43242 EGD US FINE NEEDLE BX/ASPIR: CPT | Mod: 59

## 2022-03-16 RX ORDER — SODIUM CHLORIDE 9 MG/ML
1000 INJECTION, SOLUTION INTRAVENOUS
Refills: 0 | Status: DISCONTINUED | OUTPATIENT
Start: 2022-03-16 | End: 2022-03-16

## 2022-03-16 RX ORDER — MORPHINE SULFATE 50 MG/1
6 CAPSULE, EXTENDED RELEASE ORAL
Refills: 0 | Status: DISCONTINUED | OUTPATIENT
Start: 2022-03-16 | End: 2022-03-18

## 2022-03-16 RX ORDER — FENTANYL CITRATE 50 UG/ML
25 INJECTION INTRAVENOUS
Refills: 0 | Status: DISCONTINUED | OUTPATIENT
Start: 2022-03-16 | End: 2022-03-16

## 2022-03-16 RX ORDER — MORPHINE SULFATE 50 MG/1
8 CAPSULE, EXTENDED RELEASE ORAL
Refills: 0 | Status: DISCONTINUED | OUTPATIENT
Start: 2022-03-16 | End: 2022-03-18

## 2022-03-16 RX ORDER — GLUCAGON INJECTION, SOLUTION 0.5 MG/.1ML
1 INJECTION, SOLUTION SUBCUTANEOUS ONCE
Refills: 0 | Status: DISCONTINUED | OUTPATIENT
Start: 2022-03-16 | End: 2022-03-29

## 2022-03-16 RX ORDER — DEXTROSE 50 % IN WATER 50 %
15 SYRINGE (ML) INTRAVENOUS ONCE
Refills: 0 | Status: DISCONTINUED | OUTPATIENT
Start: 2022-03-16 | End: 2022-03-29

## 2022-03-16 RX ORDER — ELECTROLYTE SOLUTION,INJ
1 VIAL (ML) INTRAVENOUS
Refills: 0 | Status: DISCONTINUED | OUTPATIENT
Start: 2022-03-16 | End: 2022-03-17

## 2022-03-16 RX ORDER — SODIUM CHLORIDE 9 MG/ML
1000 INJECTION, SOLUTION INTRAVENOUS
Refills: 0 | Status: DISCONTINUED | OUTPATIENT
Start: 2022-03-16 | End: 2022-03-29

## 2022-03-16 RX ORDER — DEXTROSE 50 % IN WATER 50 %
25 SYRINGE (ML) INTRAVENOUS ONCE
Refills: 0 | Status: DISCONTINUED | OUTPATIENT
Start: 2022-03-16 | End: 2022-03-29

## 2022-03-16 RX ORDER — ZOLPIDEM TARTRATE 10 MG/1
5 TABLET ORAL AT BEDTIME
Refills: 0 | Status: DISCONTINUED | OUTPATIENT
Start: 2022-03-16 | End: 2022-03-23

## 2022-03-16 RX ORDER — INSULIN LISPRO 100/ML
VIAL (ML) SUBCUTANEOUS
Refills: 0 | Status: DISCONTINUED | OUTPATIENT
Start: 2022-03-16 | End: 2022-03-16

## 2022-03-16 RX ORDER — ACETAMINOPHEN 500 MG
1000 TABLET ORAL ONCE
Refills: 0 | Status: DISCONTINUED | OUTPATIENT
Start: 2022-03-16 | End: 2022-03-16

## 2022-03-16 RX ORDER — DEXTROSE 50 % IN WATER 50 %
12.5 SYRINGE (ML) INTRAVENOUS ONCE
Refills: 0 | Status: DISCONTINUED | OUTPATIENT
Start: 2022-03-16 | End: 2022-03-29

## 2022-03-16 RX ORDER — INSULIN LISPRO 100/ML
VIAL (ML) SUBCUTANEOUS EVERY 6 HOURS
Refills: 0 | Status: DISCONTINUED | OUTPATIENT
Start: 2022-03-16 | End: 2022-03-17

## 2022-03-16 RX ORDER — HYDRALAZINE HCL 50 MG
5 TABLET ORAL ONCE
Refills: 0 | Status: COMPLETED | OUTPATIENT
Start: 2022-03-16 | End: 2022-03-16

## 2022-03-16 RX ADMIN — Medication 3: at 23:31

## 2022-03-16 RX ADMIN — Medication 1: at 08:20

## 2022-03-16 RX ADMIN — Medication 1 EACH: at 22:44

## 2022-03-16 RX ADMIN — MORPHINE SULFATE 4 MILLIGRAM(S): 50 CAPSULE, EXTENDED RELEASE ORAL at 07:45

## 2022-03-16 RX ADMIN — METHADONE HYDROCHLORIDE 60 MILLIGRAM(S): 40 TABLET ORAL at 09:58

## 2022-03-16 RX ADMIN — Medication 5 MILLIGRAM(S): at 17:50

## 2022-03-16 RX ADMIN — PANTOPRAZOLE SODIUM 10 MG/HR: 20 TABLET, DELAYED RELEASE ORAL at 18:53

## 2022-03-16 RX ADMIN — Medication 5 MILLIGRAM(S): at 17:43

## 2022-03-16 RX ADMIN — MORPHINE SULFATE 8 MILLIGRAM(S): 50 CAPSULE, EXTENDED RELEASE ORAL at 11:50

## 2022-03-16 RX ADMIN — MORPHINE SULFATE 4 MILLIGRAM(S): 50 CAPSULE, EXTENDED RELEASE ORAL at 04:08

## 2022-03-16 RX ADMIN — MORPHINE SULFATE 4 MILLIGRAM(S): 50 CAPSULE, EXTENDED RELEASE ORAL at 03:38

## 2022-03-16 RX ADMIN — SODIUM CHLORIDE 100 MILLILITER(S): 9 INJECTION, SOLUTION INTRAVENOUS at 06:54

## 2022-03-16 RX ADMIN — MORPHINE SULFATE 4 MILLIGRAM(S): 50 CAPSULE, EXTENDED RELEASE ORAL at 00:06

## 2022-03-16 NOTE — CHART NOTE - NSCHARTNOTEFT_GEN_A_CORE
Clinical Nutrition PPN Follow Up Note    *56 year old male admitted for acute pancreatitis without infection or necrosis. Pt w/ PMH IVDA w/ heroin, opioid dependence (on methadone), IDDM, chronic pancreatitis, recent significant wt loss x 1 year. Transfer from [facility] - admitted for worsening abdominal pain.     *current status: PPN initiated yesterday - w/ coffee ground emesis last night. Started on Protonix drip, NGT to suction. Going for EUS +/- ERCP today.     *labs reviewed;  03-16    133<L>  |  101  |  11  ----------------------------<  150<H>  3.6   |  29  |  0.48<L>    Ca    8.2<L>      16 Mar 2022 05:02  Phos  2.4     03-16  Mg     1.8     03-16    TPro  7.1  /  Alb  1.7<L>  /  TBili  1.8<H>  /  DBili  1.3<H>  /  AST  33  /  ALT  53  /  AlkPhos  1045<H>  03-16      BMI: BMI (kg/m2): 21.8 (03-14-22 @ 05:19)  HbA1c: A1C with Estimated Average Glucose Result: 9.9 % (03-15-22 @ 07:30)    Glucose: POCT Blood Glucose.: 153 mg/dL (03-16-22 @ 04:09)    BP: 154/85 (03-15-22 @ 23:07) (131/67 - 184/96)  Lipid Panel: pending    *pertient meds: methadone, insulin lispro, MVI, pancrelipase, thiamine, folic acid, albuterol, zofran    *I&O's Detail    15 Mar 2022 07:01  -  16 Mar 2022 07:00  --------------------------------------------------------  IN:    Pantoprazole: 60 mL    PRBCs (Packed Red Blood Cells): 571 mL    sodium chloride 0.9%: 500 mL  Total IN: 1131 mL    OUT:    Voided (mL): 1600 mL  Total OUT: 1600 mL    Total NET: -469 mL    * fluid status: pt w/o BM.  pt not on bowel regimen.    *TF intake: Initiated PPN over night; sodium, potassium, and phos are low - increase Na by 10 mEq, increase K by 5 mEq, increase phos by 5 mEq. Mg on low end, increase by 2 mEq. Chloride and bilirubin, total WNL - no changes made. Corrected Ca at 10.0 mg/dL - no changes made. POCT documented at 153 mg/dL, current 5 U insulin in bag, remain at 5 U insulin in bag. Triglycerides pending.     *Douglas Score of 15; Stage III sacrum PU documented. No edema documented.    Estimated Needs: based on 39Kg (wt from bedscale wt obtained by RD on 3/15)  Calories: 9272-5123 Kcal (35-40 Kcal/Kg)  Protein: 70.2-78 g (1.8-2.0 g/Kg)  Fluids: 3186-8963 mL (35-40 mL/Kg)    Weight History:  Height (cm): 167.6 (03-14-22 @ 05:19)  Weight (kg): 61.2 (03-14-22 @ 05:19)  BMI (kg/m2): 21.8 (03-14-22 @ 05:19)  BSA (m2): 1.69 (03-14-22 @ 05:19)  IBW: 172#    PPN Recommendations: via peripheral venous line  Total Volume: 1800  65 g  Amino Acids  100 g Dextrose  0 g Lipids 20%  98 mEq Sodium Chloride  18 mEq Sodium Acetate  25 mmol Sodium Phosphate  44 mEq Potassium Chloride  26 mEq Potassium Acetate  0 mmol Potassium Phosphate  0 mEq Calcium Gluconate  10 mEq Magnesium Sulfate  100 mg Thiamine  1 ml Trace   10 ml MVI    Total Calories: 600 Kcal     (Meets 44% of Estimated Energy needs and 93%  Protein needs) (osmolarity <900)    Additional Recommendations:    1) Daily weights to track/trend changes  2) Strict I & O's  3) Daily lyte checks  4) Weekly triglycerides/LFT checks  5) Monitor BM; provide bowel regimen prn.    *will continue to monitor and adjust PN prn*  Rachel Olmos, Dietetic Intern Clinical Nutrition PPN Follow Up Note    *56 year old male admitted for acute pancreatitis without infection or necrosis. Pt w/ PMH IVDA w/ heroin, opioid dependence (on methadone), IDDM, chronic pancreatitis w/ possible mass on abdomen, recent significant wt loss x 1 year. Transfer from [facility] - admitted for worsening abdominal pain.     *current status: PPN initiated yesterday - w/ coffee ground emesis last night. Started on Protonix drip, NGT to suction. Going for EUS +/- ERCP today to r/o possible mass on abdomen w/ chronic pancreatitis.      *labs reviewed; sodium, potassium, and phos are low - increase Na by 10 mEq, increase K by 5 mEq, increase phos by 5 mEq. Mg on low end, increase by 2 mEq. Chloride and bilirubin, total WNL - no changes made. Corrected Ca at 10.0 mg/dL - no changes made. POCT documented at 153 mg/dL, current 5 U insulin in bag, remain at 5 U insulin in bag. Triglycerides pending.     03-16    133<L>  |  101  |  11  ----------------------------<  150<H>  3.6   |  29  |  0.48<L>    Ca    8.2<L>      16 Mar 2022 05:02  Phos  2.4     03-16  Mg     1.8     03-16    TPro  7.1  /  Alb  1.7<L>  /  TBili  1.8<H>  /  DBili  1.3<H>  /  AST  33  /  ALT  53  /  AlkPhos  1045<H>  03-16      BMI: BMI (kg/m2): 21.8 (03-14-22 @ 05:19)  HbA1c: A1C with Estimated Average Glucose Result: 9.9 % (03-15-22 @ 07:30)    Glucose: POCT Blood Glucose.: 153 mg/dL (03-16-22 @ 04:09)    BP: 154/85 (03-15-22 @ 23:07) (131/67 - 184/96)  Lipid Panel: pending    *pertient meds: methadone, insulin lispro, MVI, pancrelipase, thiamine, folic acid, albuterol, zofran    *I&O's Detail    15 Mar 2022 07:01  -  16 Mar 2022 07:00  --------------------------------------------------------  IN:    Pantoprazole: 60 mL    PRBCs (Packed Red Blood Cells): 571 mL    sodium chloride 0.9%: 500 mL  Total IN: 1131 mL    OUT:    Voided (mL): 1600 mL  Total OUT: 1600 mL    Total NET: -469 mL    * fluid status negative, PPN not documented. Will continue to monitor.     *Pt w/o BM.  pt not on bowel regimen.    *Douglas Score of 15; Stage III sacrum PU documented. No edema documented.    * Malnutrition: Pt meets criteria for severe malnutrition in acute on chronic illness r/t decreased PO intake 2/2 altered GI function (GOO) on pancreatitis AEB 55% wt loss x 1 yr; meeting <50% of ENN chronically; severe muscle/fat wasting.    Estimated Needs: based on 39Kg (wt from bedscale wt obtained by RD on 3/15)  Calories: 0041-8836 Kcal (35-40 Kcal/Kg)  Protein: 70.2-78 g (1.8-2.0 g/Kg)  Fluids: 8576-6166 mL (35-40 mL/Kg)    Weight History:  Height (cm): 167.6 (03-14-22 @ 05:19)  Weight (kg): 61.2 (03-14-22 @ 05:19)  BMI (kg/m2): 21.8 (03-14-22 @ 05:19)  BSA (m2): 1.69 (03-14-22 @ 05:19)  IBW: 172#    PPN Recommendations: via peripheral venous line  Total Volume: 1800  65 g  Amino Acids  100 g Dextrose  0 g Lipids 20%  98 mEq Sodium Chloride  18 mEq Sodium Acetate  25 mmol Sodium Phosphate  44 mEq Potassium Chloride  26 mEq Potassium Acetate  0 mmol Potassium Phosphate  0 mEq Calcium Gluconate  10 mEq Magnesium Sulfate  100 mg Thiamine  1 ml Trace   10 ml MVI    Total Calories: 600 Kcal     (Meets 44% of Estimated Energy needs and 93%  Protein needs) (osmolarity <900)    Additional Recommendations:    1) Daily weights to track/trend changes  2) Strict I & O's  3) Daily lyte checks  4) Weekly triglycerides/LFT checks  5) Monitor BM; provide bowel regimen prn.    *will continue to monitor and adjust PN prn*  Rachel Olmos, Dietetic Intern Clinical Nutrition PPN Follow Up Note    *56 year old male admitted for acute pancreatitis without infection or necrosis. Pt w/ PMH IVDA w/ heroin, opioid dependence (on methadone), IDDM, chronic pancreatitis w/ possible mass on abdomen, recent significant wt loss x 1 year. Transfer from [facility] - admitted for worsening abdominal pain.     *current status: PPN initiated yesterday - w/ coffee ground emesis last night. Started on Protonix drip, NGT to suction. Going for EUS +/- ERCP today to r/o possible mass on abdomen w/ chronic pancreatitis.      *labs reviewed; sodium, potassium, and phos are low - increase Na by 10 mEq, increase K by 5 mEq, increase phos by 5 mEq. Mg on low end, increase by 2 mEq. Chloride and bilirubin, total WNL - no changes made. Corrected Ca at 10.0 mg/dL - no changes made. POCT documented at 153 mg/dL, current 5 U insulin in bag, remain at 5 U insulin in bag. Triglycerides pending.     03-16    133<L>  |  101  |  11  ----------------------------<  150<H>  3.6   |  29  |  0.48<L>    Ca    8.2<L>      16 Mar 2022 05:02  Phos  2.4     03-16  Mg     1.8     03-16    TPro  7.1  /  Alb  1.7<L>  /  TBili  1.8<H>  /  DBili  1.3<H>  /  AST  33  /  ALT  53  /  AlkPhos  1045<H>  03-16      BMI: BMI (kg/m2): 21.8 (03-14-22 @ 05:19)  HbA1c: A1C with Estimated Average Glucose Result: 9.9 % (03-15-22 @ 07:30)    Glucose: POCT Blood Glucose.: 153 mg/dL (03-16-22 @ 04:09)    BP: 154/85 (03-15-22 @ 23:07) (131/67 - 184/96)  Lipid Panel: pending    *pertinent meds: methadone, insulin lispro, MVI, pancrelipase, thiamine, folic acid, albuterol, zofran    *I&O's Detail    15 Mar 2022 07:01  -  16 Mar 2022 07:00  --------------------------------------------------------  IN:    Pantoprazole: 60 mL    PRBCs (Packed Red Blood Cells): 571 mL    sodium chloride 0.9%: 500 mL  Total IN: 1131 mL    OUT:    Voided (mL): 1600 mL  Total OUT: 1600 mL    Total NET: -469 mL    * fluid status negative, PPN not documented. Will continue to monitor.     *Pt w/o BM.  pt not on bowel regimen.    *Douglas Score of 15; Stage III sacrum PU documented. No edema documented.    * Malnutrition: Pt meets criteria for severe malnutrition in acute on chronic illness r/t decreased PO intake 2/2 altered GI function (GOO) on pancreatitis AEB 55% wt loss x 1 yr; meeting <50% of ENN chronically; severe muscle/fat wasting.    Estimated Needs: based on 39Kg (wt from bedscale wt obtained by RD on 3/15)  Calories: 6891-7934 Kcal (35-40 Kcal/Kg)  Protein: 70.2-78 g (1.8-2.0 g/Kg)  Fluids: 4813-0809 mL (35-40 mL/Kg)    Weight History:  Height (cm): 167.6 (03-14-22 @ 05:19)  Weight (kg): 61.2 (03-14-22 @ 05:19)  BMI (kg/m2): 21.8 (03-14-22 @ 05:19)  BSA (m2): 1.69 (03-14-22 @ 05:19)  IBW: 172#    PPN Recommendations: via peripheral venous line  Total Volume: 1800  65 g  Amino Acids  100 g Dextrose  0 g Lipids 20%  98 mEq Sodium Chloride  18 mEq Sodium Acetate  25 mmol Sodium Phosphate  44 mEq Potassium Chloride  26 mEq Potassium Acetate  0 mmol Potassium Phosphate  0 mEq Calcium Gluconate  10 mEq Magnesium Sulfate  100 mg Thiamine  5 units regular insulin  1 ml Trace   10 ml MVI    Total Calories: 600 Kcal     (Meets 44% of Estimated Energy needs and 93%  Protein needs) (osmolarity <900)    Additional Recommendations:    1) Daily weights to track/trend changes  2) Strict I & O's  3) Daily lyte checks  4) Weekly triglycerides/LFT checks  5) Monitor BM; provide bowel regimen prn.    *will continue to monitor and adjust PN prn*  Rachel Olmos, Dietetic Intern

## 2022-03-16 NOTE — PROGRESS NOTE ADULT - SUBJECTIVE AND OBJECTIVE BOX
CC- abdominal pain-- transfer for possible ERCP (15 Mar 2022 10:22)    HPI:  57 y/o male with PMHX of IVDA with heroin-- last used 2 months ago as per patient, opioid dependence on methadone, IDDM, chronic pancreatitis, and recent significant weight loss over the last 1 year who was a transfer from [facility] ([place]) for possible ERCP.  Patient initially presented to outside hospital for worsening abdominal pain.  Patient had CT/ MRI/ US of the abdomen which showed chronic pancreatitis, gastric distention (for which he had an NGT),  intra/ extra hepatic biliary ductal dilatation, and fluid collection/ ? mass in the head of the pancreas compressing the SMV and proximal portal vein.  Patient was transferred to  for possible need for ERCP and further GI evaluation.  Upon admission, patient with significantly elevated ALKPHOS 1200.        PAST MEDICAL & SURGICAL HISTORY:  IVDA  Opioid dependance  Pancreatitis    FAMILY HISTORY:  Unable to obtain from patient at this time.    Social History:    Previously living in SNF.    Transfer from [facility].    +Drug use-- IVDA / heroin/ fentanyl.    No tob use.      Allergies:  No Known Allergies       3/15/22- events of last night noted. Had coffee-ground emesis and started on Protonix drip. NGT to suction. NPO  3/16 - pt seen and examined,   Review of system- All 10 systems reviewed and is as per HPI otherwise negative.     T(C): 36.6 (03-15-22 @ 16:01), Max: 37.1 (03-15-22 @ 00:25)  HR: 75 (03-15-22 @ 16:01) (70 - 87)  BP: 143/72 (03-15-22 @ 16:01) (131/67 - 150/74)  RR: 18 (03-15-22 @ 16:01) (16 - 18)  SpO2: 100% (03-15-22 @ 16:01) (100% - 100%)  Wt(kg): --    LABS:                        7.2    10.10 )-----------( 440      ( 15 Mar 2022 07:30 )             23.0     03-15    x   |  x   |  x   ----------------------------<  121<H>  x    |  x   |  x     Ca    7.9<L>      15 Mar 2022 07:30    TPro  6.5  /  Alb  1.3<L>  /  TBili  1.3<H>  /  DBili  1.1<H>  /  AST  34  /  ALT  59  /  AlkPhos  985<H>  03-15    PT/INR - ( 14 Mar 2022 05:32 )   PT: 14.9 sec;   INR: 1.28 ratio       PTT - ( 14 Mar 2022 05:32 )  PTT:31.4 sec    CAPILLARY BLOOD GLUCOSE  POCT Blood Glucose.: 148 mg/dL (15 Mar 2022 11:49)  POCT Blood Glucose.: 37 mg/dL (15 Mar 2022 11:20)  POCT Blood Glucose.: 35 mg/dL (15 Mar 2022 11:19)  POCT Blood Glucose.: 193 mg/dL (15 Mar 2022 04:51)  POCT Blood Glucose.: 292 mg/dL (14 Mar 2022 22:10)  POCT Blood Glucose.: 219 mg/dL (14 Mar 2022 17:00)    RADIOLOGY & ADDITIONAL TESTS:  CT ABDOMEN AND PELVIS IC                        PROCEDURE DATE:  03/14/2022      INTERPRETATION:  CLINICAL INFORMATION: Pancreatitis suspected    COMPARISON: None.    CONTRAST/COMPLICATIONS:  IV Contrast: Omnipaque 350  90 cc administered   10 cc discarded  Oral Contrast: NONE  Complications: None reported at time of study completion    PROCEDURE:  CT of the Abdomen and Pelvis was performed.  Sagittal and coronal reformats were performed.    FINDINGS:  LOWER CHEST: Mild patchy groundglass opacity in the left lower lobe.    LIVER: Within normal limits.  BILE DUCTS/PANCREAS:  *Moderate intrahepatic and hepatic biliary ductal dilatation. The common   duct measures 15 mm. There is abrupt cut off of the duct in its   intrapancreatic portion.*Pancreatic duct is markedly dilated, measuring   11 mm, with abrupt cutoff in the neck due to multiple intraductal calculi.  *Additional coarse pancreatic calcifications are identified compatible   with chronic pancreatitis.  *No definite findings to suggest acute pancreatitis, however evaluation   is limited due to paucity of peripancreatic fat.  *Cystic lesions in the neck and head measuring up to 2.7 cm.  GALLBLADDER: Markedly distended and containing multiple gallstones.  SPLEEN: Within normal limits.  PANCREAS: Within normal limits.  ADRENALS: Within normal limits.  KIDNEYS/URETERS: Within normal limits.    BLADDER: Within normal limits.  REPRODUCTIVE ORGANS: Prostate within normal limits.    BOWEL: No bowel obstruction. Appendix not visualized. Enteric tube with   tip in the distal esophagus PERITONEUM: Small amount of free pelvic fluid.  VESSELS: Multiple perigastric collateral vessels are identified. The   splenic vein is patent. There is focal narrowing of the splenic vein at   the portal confluence. Atherosclerotic arterial calcifications.  RETROPERITONEUM/LYMPH NODES: No lymphadenopathy.  ABDOMINAL WALL: Anasarca.  BONES: No acute abnormality.    IMPRESSION:  Chronic pancreatitis.    Pancreatic ductal dilatation due to multiple intraductal stones.    Biliary ductal dilatation the exact etiology which could be due to post   pancreatic stricture. Consider further evaluation with MRI or endoscopic   ultrasound as an occult pancreatic mass is not excluded.    Cystic pancreatic lesions most likely representing pseudocysts.    Cholelithiasis and marked gallbladder distention, without evidence of   acute cholecystitis.    Mild left lower lobe opacity suspicious for pneumonia.    Enteric tube with tip in the distal esophagus.    PHYSICAL EXAM:  GENERAL: cechectic, NAD  HEAD:  Atraumatic, Normocephalic  EYES: EOMI, PERRLA, conjunctiva and sclera clear  HEENT: NGT+  NECK: Supple, No JVD  NERVOUS SYSTEM:  Alert & Oriented X2, moves all extremities  CHEST/LUNG: Clear to auscultation bilaterally; No rales, rhonchi, wheezing, or rubs  HEART: Regular rate and rhythm; No murmurs, rubs, or gallops  ABDOMEN: Soft, diffusely sensitive to palpations, +BS  GENITOURINARY- Voiding, no palpable bladder  EXTREMITIES:  2+ Peripheral Pulses, No clubbing, cyanosis, or edema  MUSCULOSKELTAL- No muscle tenderness, Muscle tone normal, No joint tenderness, no Joint swelling, Joint range of motion-normal  SKIN-no rash, no lesion    MEDICATIONS  (STANDING):  dextrose 40% Gel 15 Gram(s) Oral once  dextrose 5% + sodium chloride 0.9%. 1000 milliLiter(s) (100 mL/Hr) IV Continuous <Continuous>  dextrose 5%. 1000 milliLiter(s) (50 mL/Hr) IV Continuous <Continuous>  dextrose 5%. 1000 milliLiter(s) (100 mL/Hr) IV Continuous <Continuous>  dextrose 50% Injectable 25 Gram(s) IV Push once  dextrose 50% Injectable 12.5 Gram(s) IV Push once  dextrose 50% Injectable 25 Gram(s) IV Push once  folic acid 1 milliGRAM(s) Oral daily  glucagon  Injectable 1 milliGRAM(s) IntraMuscular once  insulin glargine Injectable (LANTUS) 6 Unit(s) SubCutaneous at bedtime  insulin lispro (ADMELOG) corrective regimen sliding scale   SubCutaneous three times a day before meals  insulin lispro Injectable (ADMELOG) 2 Unit(s) SubCutaneous three times a day before meals  methadone    Tablet 60 milliGRAM(s) Oral daily  multivitamin 1 Tablet(s) Oral daily  pancrelipase (ZENPEP 20,000 Lipase Units) 1 Capsule(s) Oral three times a day with meals  pantoprazole Infusion 8 mG/Hr (10 mL/Hr) IV Continuous <Continuous>  Parenteral Nutrition - Adult 1 Each (75 mL/Hr) TPN Continuous <Continuous>  thiamine 100 milliGRAM(s) Oral daily    MEDICATIONS  (PRN):  acetaminophen     Tablet .. 650 milliGRAM(s) Oral every 6 hours PRN Temp greater or equal to 38C (100.4F), Mild Pain (1 - 3)  ALBUTerol    90 MICROgram(s) HFA Inhaler 2 Puff(s) Inhalation every 6 hours PRN Bronchospasm  aluminum hydroxide/magnesium hydroxide/simethicone Suspension 30 milliLiter(s) Oral every 4 hours PRN Dyspepsia  melatonin 3 milliGRAM(s) Oral at bedtime PRN Insomnia  morphine  - Injectable 4 milliGRAM(s) IV Push every 4 hours PRN Severe Pain (7 - 10)  ondansetron Injectable 4 milliGRAM(s) IV Push every 8 hours PRN Nausea and/or Vomiting    Assessment/Plan  57 y/o male with PMHX of IVDA with heroin/ fentanyl-- last used 2 months ago as per patient, opioid dependence on methadone, IDDM, chronic pancreatitis, and recent significant weight loss over the last 1 year who was a transfer from [facility] ([place]) for possible ERCP after being found to have ? mass on MRI abdomen with chronic pancreatitis.      #Abd pain/ Chronic Pancreatitis/ Choledocholithiasis/ Biliary ductal dilatation/ possible pancreatic mass  NPO, IVF  Surg onc eval DR Ordaz appreciated  Started on PPN  NGT to suction  Pain meds prn  For EGD/EUS/ ?celiac block  tomorrow with DR Lemon  MRI abdomen done, report pending  CT abd- pancreatic duct dilation with stones    #UGI bleeding  #Anemia acute blood loss on chronic  Cont Protonix drip  Transfuse 2 Units PRBC today  Monitor HH  For EGD tomorrow    #Weight Loss:    HIV/hepatitis panel negative    #IVDA/ Opioid Dependence:    Patient was on methadone 60 mg daily as per notes.    Now NPO receiving IV morphine.    Will adjust after discussing with GI.      #IDDM with hypoglycemic episode   Glu earlier today in the 30's- responded to D50  Will hold Lantus tonight  Started on D5NS IVF  Adjust further based on BGM's     #Severe Protein Calorie Malnutrition/ Cachexia:      On PPN    #DVT Proph:  venodynes    #Dispo- for endoscopy tomorrow.      CC- abdominal pain    HPI:      57 y/o male with PMHX of IVDA with heroin(  last used 2 months ago ) , opioid dependence on methadone, IDDM, chronic pancreatitis, and recent significant weight loss over the last 1 year who was a transfer from [facility] on 3/14/22  for possible ERCP.  Patient initially presented to outside hospital for worsening abdominal pain.  Patient had CT/ MRI/ US of the abdomen which showed chronic pancreatitis, gastric distention (for which he had an NGT),  intra/ extra hepatic biliary ductal dilatation, and fluid collection/ ? mass in the head of the pancreas compressing the SMV and proximal portal vein.  Patient was transferred to  for possible need for ERCP and further GI evaluation.  Upon admission, patient with significantly elevated ALKPHOS 1200.       Hospital course :   3/15/22- events of last night noted. Had coffee-ground emesis and started on Protonix drip. NGT to suction. NPO  3/16 - pt seen and examined, + NGT in place, reports severe abdominal pain, +constipation, denies cp, dyspnea, afebrile    Review of system- All 10 systems reviewed and is as per HPI otherwise negative.     Vitals reviewed for last 24 hours  T(C): 36.5 (03-16-22 @ 21:57), Max: 36.9 (03-16-22 @ 17:45)  T(F): 97.7 (03-16-22 @ 21:57), Max: 98.4 (03-16-22 @ 17:45)  HR: 81 (03-16-22 @ 21:57) (61 - 82)  BP: 143/73 (03-16-22 @ 21:57) (143/73 - 191/94)  RR: 17 (03-16-22 @ 21:57) (12 - 18)  SpO2: 99% (03-16-22 @ 21:57) (94% - 100%)  Wt(kg): --  A1C with Estimated Average Glucose (03.15.22 @ 07:30)    A1C with Estimated Average Glucose Result: 9.9:  CAPILLARY BLOOD GLUCOSE  POCT Blood Glucose.: 254 mg/dL (16 Mar 2022 22:51)  POCT Blood Glucose.: 140 mg/dL (16 Mar 2022 18:41)  POCT Blood Glucose.: 127 mg/dL (16 Mar 2022 12:00)  POCT Blood Glucose.: 172 mg/dL (16 Mar 2022 07:55)  POCT Blood Glucose.: 153 mg/dL (16 Mar 2022 04:09)  POCT Blood Glucose.: 102 mg/dL (15 Mar 2022 23:56)      PHYSICAL EXAM:  GENERAL: cachectic,  NAD  HEAD:  Atraumatic, Normocephalic  EYES: EOMI, PERRLA, conjunctiva and sclera clear  HEENT: NGT+with black discharge   NECK: Supple, No JVD  NERVOUS SYSTEM:  Alert & Oriented X2, moves all extremities  CHEST/LUNG: Clear to auscultation bilaterally; No rales, rhonchi, wheezing, or rubs  HEART: Regular rate and rhythm; No murmurs, rubs, or gallops  ABDOMEN: Soft, diffusely sensitive to palpations, +BS  GENITOURINARY- Voiding, no palpable bladder  EXTREMITIES:  2+ Peripheral Pulses, No clubbing, cyanosis, or edema  MUSCULOSKELETAL No muscle tenderness, Muscle tone normal, No joint tenderness, no Joint swelling, Joint range of motion-normal  SKIN-no rash, no lesion    Lab all reviewed   03-16    133<L>  |  101  |  11  ----------------------------<  150<H>  3.6   |  29  |  0.48<L>    Ca    8.2<L>      16 Mar 2022 05:02  Phos  2.4     03-16  Mg     1.8     03-16    TPro  7.1  /  Alb  1.7<L>  /  TBili  1.8<H>  /  DBili  1.3<H>  /  AST  33  /  ALT  53  /  AlkPhos  1045<H>  03-16                        10.5   10.54 )-----------( 444      ( 16 Mar 2022 05:02 )             32.3     LIVER FUNCTIONS - ( 16 Mar 2022 05:02 )    Alb: 1.7 g/dL / Pro: 7.1 gm/dL / ALK PHOS: 1045 U/L / ALT: 53 U/L / AST: 33 U/L / GGT: x           Lipase, Serum (03.14.22 @ 05:32)    Lipase, Serum: 367 U/L        RADIOLOGY & ADDITIONAL TESTS:    12 Lead ECG (03.14.22 @ 05:34) >  Ventricular Rate 92 BPM  QTC Calculation(Bazett) 499 ms  Diagnosis Line Normal sinus rhythm  Anteroseptal infarct , age undetermined  Abnormal ECG  No previous ECGs available    Xray Chest 1 View-PORTABLE IMMEDIATE (Xray Chest 1 View-PORTABLE IMMEDIATE .) (03.15.22 @ 19:51) >  Frontal expiratory view of the chest showsthe heart to be normal in   size. Nasogastric tube reaches the stomach.    The lungs are clear and there is no evidence of pneumothorax nor pleural   effusion.    IMPRESSION:  NG tube to stomach    MR MRCP w/wo IV Cont (03.15.22 @ 15:44) >  IMPRESSION:  Pancreatic head mass suspicious for neoplasm with associated biliary and   pancreatic ductal dilatation. Recommend biopsy.  Upper abdominal lymphadenopathy  Distended stomach. Recommend clinical correlation for partial gastric   outlet obstruction    Xray Abdomen 1 View PORTABLE -Urgent (Xray Abdomen 1 View PORTABLE -Urgent .) (03.15.22 @ 10:53) >  IMPRESSION:  Feeding tube in satisfactory position.    Mild to moderate pulmonary venous congestion/perihilar interstitial   infiltrates, new        CT ABDOMEN AND PELVIS IC                        PROCEDURE DATE:  03/14/2022    LOWER CHEST: Mild patchy groundglass opacity in the left lower lobe.  LIVER: Within normal limits.  BILE DUCTS/PANCREAS:  Moderate intrahepatic and hepatic biliary ductal dilatation. The common   duct measures 15 mm. There is abrupt cut off of the duct in its   intrapancreatic portion.*Pancreatic duct is markedly dilated, measuring   11 mm, with abrupt cutoff in the neck due to multiple intraductal calculi.  Additional coarse pancreatic calcifications are identified compatible   with chronic pancreatitis.  *No definite findings to suggest acute pancreatitis, however evaluation   is limited due to paucity of peripancreatic fat.  *Cystic lesions in the neck and head measuring up to 2.7 cm.  GALLBLADDER: Markedly distended and containing multiple gallstones.  SPLEEN: Within normal limits.  PANCREAS: Within normal limits.  ADRENALS: Within normal limits.  KIDNEYS/URETERS: Within normal limits.  BLADDER: Within normal limits.  REPRODUCTIVE ORGANS: Prostate within normal limits.  BOWEL: No bowel obstruction. Appendix not visualized. Enteric tube with   tip in the distal esophagus PERITONEUM: Small amount of free pelvic fluid.  VESSELS: Multiple perigastric collateral vessels are identified. The   splenic vein is patent. There is focal narrowing of the splenic vein at   the portal confluence. Atherosclerotic arterial calcifications.  RETROPERITONEUM/LYMPH NODES: No lymphadenopathy.  ABDOMINAL WALL: Anasarca.  BONES: No acute abnormality.  IMPRESSION:  Chronic pancreatitis.  Pancreatic ductal dilatation due to multiple intraductal stones.  Biliary ductal dilatation the exact etiology which could be due to post   pancreatic stricture. Consider further evaluation with MRI or endoscopic   ultrasound as an occult pancreatic mass is not excluded.  Cystic pancreatic lesions most likely representing pseudocysts.  Cholelithiasis and marked gallbladder distention, without evidence of   acute cholecystitis.  Mild left lower lobe opacity suspicious for pneumonia.  Enteric tube with tip in the distal esophagus.      MEDICATIONS  (STANDING):  dextrose 40% Gel 15 Gram(s) Oral once  dextrose 5%. 1000 milliLiter(s) (50 mL/Hr) IV Continuous <Continuous>  dextrose 5%. 1000 milliLiter(s) (100 mL/Hr) IV Continuous <Continuous>  dextrose 50% Injectable 25 Gram(s) IV Push once  dextrose 50% Injectable 12.5 Gram(s) IV Push once  dextrose 50% Injectable 25 Gram(s) IV Push once  folic acid 1 milliGRAM(s) Oral daily  glucagon  Injectable 1 milliGRAM(s) IntraMuscular once  insulin lispro (ADMELOG) corrective regimen sliding scale   SubCutaneous every 6 hours  methadone    Tablet 60 milliGRAM(s) Oral daily  multivitamin 1 Tablet(s) Oral daily  pancrelipase (ZENPEP 20,000 Lipase Units) 1 Capsule(s) Oral three times a day with meals  pantoprazole Infusion 8 mG/Hr (10 mL/Hr) IV Continuous <Continuous>  Parenteral Nutrition - Adult 1 Each (75 mL/Hr) TPN Continuous <Continuous>  Parenteral Nutrition - Adult 1 Each (75 mL/Hr) TPN Continuous <Continuous>  thiamine 100 milliGRAM(s) Oral daily    MEDICATIONS  (PRN):  acetaminophen     Tablet .. 650 milliGRAM(s) Oral every 6 hours PRN Temp greater or equal to 38C (100.4F), Mild Pain (1 - 3)  ALBUTerol    90 MICROgram(s) HFA Inhaler 2 Puff(s) Inhalation every 6 hours PRN Bronchospasm  aluminum hydroxide/magnesium hydroxide/simethicone Suspension 30 milliLiter(s) Oral every 4 hours PRN Dyspepsia  melatonin 3 milliGRAM(s) Oral at bedtime PRN Insomnia  morphine  - Injectable 8 milliGRAM(s) IV Push every 3 hours PRN Severe Pain (7 - 10)  morphine  - Injectable 6 milliGRAM(s) IV Push every 3 hours PRN Moderate Pain (4 - 6)  ondansetron Injectable 4 milliGRAM(s) IV Push every 8 hours PRN Nausea and/or Vomiting  zolpidem 5 milliGRAM(s) Oral at bedtime PRN Insomnia

## 2022-03-16 NOTE — PROGRESS NOTE ADULT - ASSESSMENT
57y/o M pmhx IVDA, EtOH abuse, DM, HTN presents with abdominal pain and coffee ground emesis with CT and MRCP findings of double duct sign and GOO 2/2 pancreatic head mass  Plan:   - pain control prn  - monitor VS   - NPO   - NGT LIWS   - continue PPI infusion  - PPN   - Consider placing PICC line for TPN   - f/u GI - for EUS  - Surgical oncology will follow     Case discussed with Surgical Oncology attending

## 2022-03-16 NOTE — BRIEF OPERATIVE NOTE - COMMENTS
GJ tube:   G tube /Suction port for meds and for venting  J tube/Feeds port for feeds only  Rx port never to be used.

## 2022-03-16 NOTE — BRIEF OPERATIVE NOTE - OPERATION/FINDINGS
Severely inflammed duodenum from chronic pancreatitis. No gastric outlet obstruction but symptosm likely due to severe inflammation. G-J tube placed.   EUS with severe chronic pancreatitis. Large amount of calcifications in the head making evaluation for mass almost impossible. S/p FNB and sent for pathology.   ERCP with inability to cross wire into distal pancreas due to stones. Pancreatic sphincerotomy performed for decompression. 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

## 2022-03-16 NOTE — PHYSICAL THERAPY INITIAL EVALUATION ADULT - GENERAL OBSERVATIONS, REHAB EVAL
Pt rec'd supine in bed, 1:1 CO present, pt with no c/o pain at rest, +NGT, cooperative with PT. Pt appears very atrophied and weak.

## 2022-03-16 NOTE — PROGRESS NOTE ADULT - SUBJECTIVE AND OBJECTIVE BOX
Patient seen and examined at bedside this am.  Complains of epigastric abdominal pain.  States he is hungry and would like to eat. NGT remains in place.  MRCP performed yesterday showing pancreatic head mass with ductal dilatation with associated GOO. Denies fever, chills, chest pain, sob, n/v/d.     Vital Signs (24 Hrs):  T(C): 36.8 (03-15-22 @ 23:07), Max: 37 (03-15-22 @ 18:15)  HR: 77 (03-15-22 @ 23:07) (75 - 77)  BP: 154/85 (03-15-22 @ 23:07) (132/75 - 154/85)  RR: 18 (03-15-22 @ 23:07) (16 - 18)  SpO2: 100% (03-15-22 @ 23:07) (99% - 100%)  Wt(kg): --  Daily     Daily     I&O's Summary    15 Mar 2022 07:01  -  16 Mar 2022 07:00  --------------------------------------------------------  IN: 1131 mL / OUT: 1600 mL / NET: -469 mL      15 Mar 2022 07:01  -  16 Mar 2022 07:00  --------------------------------------------------------  IN:    Pantoprazole: 60 mL    PRBCs (Packed Red Blood Cells): 571 mL    sodium chloride 0.9%: 500 mL  Total IN: 1131 mL    OUT:    Voided (mL): 1600 mL  Total OUT: 1600 mL    Total NET: -469 mL          Physical Exam:   General: AAOx3, malnourished, cachectic  Cardio: RRR, S1, S2   Pulmonary: equal chest rise B/L   Abdomen: soft, TTP epigastric, mildly distended, NGT in place, 500cc coffee ground emesis    .  LABS:                         10.5   10.54 )-----------( 444      ( 16 Mar 2022 05:02 )             32.3     03-16    133<L>  |  101  |  11  ----------------------------<  150<H>  3.6   |  29  |  0.48<L>    Ca    8.2<L>      16 Mar 2022 05:02  Phos  2.4     03-16  Mg     1.8     03-16    TPro  7.1  /  Alb  1.7<L>  /  TBili  1.8<H>  /  DBili  1.3<H>  /  AST  33  /  ALT  53  /  AlkPhos  1045<H>  03-16              RADIOLOGY, EKG & ADDITIONAL TESTS: Reviewed.

## 2022-03-16 NOTE — PROGRESS NOTE ADULT - ASSESSMENT
55 y/o male with PMHX of IVDA with heroin , opioid dependence on methadone, IDDM, chronic pancreatitis, abnormal weight loss over the last 1 year who was a transfer from [facility] ([place]) on 3/14/22  for possible ERCP.  Patient initially presented to outside hospital for worsening abdominal pain.  Patient had CT/ MRI/ US of the abdomen which showed chronic pancreatitis, gastric distention (for which he had an NGT),  intra/ extra hepatic biliary ductal dilatation, and fluid collection/  mass in the head of the pancreas compressing the SMV and proximal portal vein.  Upon admission, patient with significantly elevated ALK PHOS 1200.      Epigastric abdominal pain due to   Pancreatic mass,  associated with biliary and pancreatic duct dilatation , pancreatic stones with underlying chronic pancreatitis s/p ERCP, EUS s/p FNA  Partial gastric outlet obstruction due to severe duodenal inflamation s/p NGT now s/p GJ tube   Cholelithiasis and marked gallbladder distention without acute cholecystitis  Elevated CA 19-9   - AP 1293--> 1045, lipase 367, WBC 10 , CA 19-9 290  - CT abd and MRCP - noted   - 3/16/22 - s/p EUS and FNA of the pancreatic mass , ERCP, pancreatic sphincterotomy , s/p pancreatic stent , s/p GJ tube   - NPO s/p GJ tube  - c/w PPN, d/c IV fluids  - GI consult Dr. Lemon  - Surg onc eval DR Ordaz appreciated  - Pain management as per palliative team   - nutritional consult for tube feedings     Hematemesis 3/15/22 Acute blood loss anemia due to upper GI bleeding   Iron deficiency   - s/p lovenox 40 3/14  - d/sherice   - Cont Protonix drip  - Hb 9--> 7.2-> 10.5   - s/p  2 Units PRBC    Mild leukocytosis likely due to malignancy  LLL opacity suspected Pneumonia possible aspiration  - check UA, urine and blood cultures  - CXR - infiltrates  - CT abd - LLL PNA   - start Zosyn     DM type2 with A1C 9.9 with hypoglycemic episode on 3/15   - Glu earlier today in the 30's- responded to D50  - s/p lantus 6 unit hs - on  hold  - s/p  D5NS IVF will stop, continue with PPN   - correctional insulin only   - Adjust further based on BGM's     Abnormal Weight Loss   Severe Protein Calorie Malnutrition/ Cachexia      Severe hypoalbuminemia  - On PPN  - HIV/hepatitis panel negative    IVDA/ Opioid Dependence  - Patient was on methadone 60 mg daily as per notes.    - Now NPO receiving IV morphine.    - palliative team consult - for pain management     Hypocalcemia - check vitamin D         DVT Proph:  diegoes    Dispo- PPN, IV abx, labs in am, continue to monitor

## 2022-03-16 NOTE — CONSULT NOTE ADULT - ASSESSMENT
Assessment:   56 year old male phx of IVDA with heroin last used 2 months  ago as per patient, opioid dependence on methadone, IDDM, chronic pancreatitis,  and recent significant weight loss over admitted for panreatitis, found to have  pancreatic mass.      Process of Care  --Reviewed dx/treatment problems and alignment with Goals of Care    Physical Aspects of Care  --Pain  patient denies at this time  10/10 at worse  DC Morphine 4mg IV [id number] PRN   START Morphine 6mg IV [id number] PRN for moderate pain  START Morphine 8mg IV [id number] PRN for severe pain  c/w Tylenol 1000mg TID IV x 3 days if remains NPO (can convert to PO once tolerated)  Will follow and adjust medications and will likely need conversion to long acting : once PO could use methadone BID or TID for great pain management or fentanyl patches.   pt w/ hx of IVDA which puts him at high risk BUT d/t medical issues his pain will necessitate opiate regimens at least in patient.  Risk and benefits discussed w/ patient and so was above plan      --Bowel Regimen  denies constipation  risk for constipation d/t immobility and opiates  daily dulcolax PO once able,  Dulcolax suppository [id number] if no BMs    --Dyspnea  No SOB at this time  comfortable and in NAD    --Nausea Vomiting  denies    --Weakness  PT as tolerated     Psychological and Psychiatric Aspects of Care:   --Greif/Bereavment: emotional support provided  --Hx of psychiatric dx: none  -Pastoral Care Available PRN     Social Aspects of Care  -SW involved     Cultural Aspects  -Primary Language: English    Goals of Care:     We discussed Palliative Care team being a supportive team when a patient has ongoing illnesses.  We also discussed that it is not an end of life care service, but can help navigate symptoms and emotional support througout their hospital stay here.    curretnly FULL CODE   GOC note above.    Ethical and Legal Aspects:   NA    Capacity- pt w/ capacity for complex decisions    HCP/Surrogate: Eusebio Pugh (brother)  Code Status- FULL   MOLST- NA  Dispo Plan-  Discussed With:  Case coordinated with attending and SW and RN     Time Spent: 120 minutes including the care, coordination and counseling of this patient, 50% of which was spent coordinating and counseling.

## 2022-03-16 NOTE — CONSULT NOTE ADULT - SUBJECTIVE AND OBJECTIVE BOX
HPI:    Mr. Pugh is a 56 year old male phx of IVDA with heroin last used 2 months  ago as per patient, opioid dependence on methadone (states for 27 years??), IDDM, chronic pancreatitis, and recent significant weight loss over admitted for panreatitis, found to have pancreatic mass.     3/16/22  Pt seen and examined bed side, NGT inplace to suciton. Pt in signficant pain this morning last dose of Morphine 4mg was at 7am and I saw him closer to 11.  He states the 4mg had little to no effect.  We discussed options for pain managment now and then possibly moving forward if symptoms do not alleviate.     He is frustrated that he's so sick, he wishes he could pull the NGT and go home.  He has some underlying diagnosis of anxiety/depression he states -- he doesn't know what psych meds he's on and doesn't have his cellphone to give us contact info on outpatient psych.      As per SW note:  Hx of Patient's residence is in the [place], was discharged from  [facility] on 2/22/2022 to [facility] in [place] for ASHLEY. Brother is  Eusebio .     Patient told me that Eusebio would be his HCP if he's unable to make any medical decisions.         PAIN: (x )Yes   ( )No  Level:severe  Location: abdomen , epigastric  Intensity:    10/10  Quality: gnawing, shooting  Aggravating Factors: po intake   Alleviating Factors: nothing  Radiation: to the back  Duration/Timing: constant  Impact on ADLs: signficant    DYSPNEA: ( ) Yes  ( xx) No  Level:    PAST MEDICAL & SURGICAL HISTORY:      SOCIAL HX:    Hx opiate tolerance (x )YES  ( )NO  PT hx of heroin use, on methdone 60mg daily for substance deterrance.     Baseline ADLs  (Prior to Admission)  (x  ) Independent   ( )Dependent    FAMILY HISTORY:      Review of Systems:    Anxiety- c/o signficant anxiety  Depression- sad and states hx of depression unknown meds at home he states  Physical Discomfort- signficant d/t abdominal pain  Dyspnea- none   Constipation- none  Nausea- ++   Vomiting- not at thsi time  Anorexia- ++  Weight Loss- signficant   Cough- none  Secretions- none  Fatigue- _++   Weakness- ++  Delirium- none    All other systems reviewed and negative        PHYSICAL EXAM:    Vital Signs Last 24 Hrs  T(C): 36.8 (16 Mar 2022 11:52), Max: 37 (15 Mar 2022 18:15)  T(F): 98.3 (16 Mar 2022 11:52), Max: 98.6 (15 Mar 2022 18:15)  HR: 74 (16 Mar 2022 11:52) (74 - 82)  BP: 144/87 (16 Mar 2022 11:52) (132/75 - 158/79)  BP(mean): --  RR: 18 (16 Mar 2022 11:52) (16 - 18)  SpO2: 100% (16 Mar 2022 11:52) (99% - 100%)  Daily     Daily     PPSV2:30   %  FAST:    General: cachectic, severe frailty   Mental Status: a+Ox3  HEENT: EOMI PERRL  Lungs: ctabl bl  Cardiac: s1s2  GI: abdomen tender to palpation, not distended  : voids  Ext: moves all 4 extremiteis spontaneously  Neuro: follows commands      LABS:                        10.5   10.54 )-----------( 444      ( 16 Mar 2022 05:02 )             32.3     03-16    133<L>  |  101  |  11  ----------------------------<  150<H>  3.6   |  29  |  0.48<L>    Ca    8.2<L>      16 Mar 2022 05:02  Phos  2.4     03-16  Mg     1.8     03-16    TPro  7.1  /  Alb  1.7<L>  /  TBili  1.8<H>  /  DBili  1.3<H>  /  AST  33  /  ALT  53  /  AlkPhos  1045<H>  03-16      Albumin: Albumin, Serum: 1.7 g/dL (03-16 @ 05:02)      Allergies    No Known Allergies    Intolerances      MEDICATIONS  (STANDING):  dextrose 40% Gel 15 Gram(s) Oral once  dextrose 5% + sodium chloride 0.9%. 1000 milliLiter(s) (100 mL/Hr) IV Continuous <Continuous>  dextrose 5%. 1000 milliLiter(s) (100 mL/Hr) IV Continuous <Continuous>  dextrose 5%. 1000 milliLiter(s) (50 mL/Hr) IV Continuous <Continuous>  dextrose 50% Injectable 25 Gram(s) IV Push once  dextrose 50% Injectable 12.5 Gram(s) IV Push once  dextrose 50% Injectable 25 Gram(s) IV Push once  folic acid 1 milliGRAM(s) Oral daily  glucagon  Injectable 1 milliGRAM(s) IntraMuscular once  insulin lispro (ADMELOG) corrective regimen sliding scale   SubCutaneous three times a day before meals  methadone    Tablet 60 milliGRAM(s) Oral daily  multivitamin 1 Tablet(s) Oral daily  pancrelipase (ZENPEP 20,000 Lipase Units) 1 Capsule(s) Oral three times a day with meals  pantoprazole Infusion 8 mG/Hr (10 mL/Hr) IV Continuous <Continuous>  Parenteral Nutrition - Adult 1 Each (75 mL/Hr) TPN Continuous <Continuous>  Parenteral Nutrition - Adult 1 Each (75 mL/Hr) TPN Continuous <Continuous>  thiamine 100 milliGRAM(s) Oral daily    MEDICATIONS  (PRN):  acetaminophen     Tablet .. 650 milliGRAM(s) Oral every 6 hours PRN Temp greater or equal to 38C (100.4F), Mild Pain (1 - 3)  ALBUTerol    90 MICROgram(s) HFA Inhaler 2 Puff(s) Inhalation every 6 hours PRN Bronchospasm  aluminum hydroxide/magnesium hydroxide/simethicone Suspension 30 milliLiter(s) Oral every 4 hours PRN Dyspepsia  melatonin 3 milliGRAM(s) Oral at bedtime PRN Insomnia  morphine  - Injectable 8 milliGRAM(s) IV Push every 3 hours PRN Severe Pain (7 - 10)  morphine  - Injectable 6 milliGRAM(s) IV Push every 3 hours PRN Moderate Pain (4 - 6)  ondansetron Injectable 4 milliGRAM(s) IV Push every 8 hours PRN Nausea and/or Vomiting      RADIOLOGY/ADDITIONAL STUDIES:

## 2022-03-16 NOTE — CONSULT NOTE ADULT - CONVERSATION DETAILS
We discussed Palliative Medicine team being a supportive team when a patient has ongoing illnesses.  We also discussed that it is not an end of life care service, but can help navigate symptoms and emotional support througout their hospital stay here.        Pts main concern at this time is pain management.   Significant debilitating pain, hx of IVD abuse.       CPR and Intubation briefly introduced  as advanced directive discussions which are had w/ any patient presenting to hospital. He did not want to discuss much about this except was willing to states his brother Eusebio would be his HCP in case of an emergency. He states he has a HCP will ask brother to bring it in.       At this time he is hoping to get NGT out and start eating as he is 'starving".     At this time further goals of care were not discussed: as of now, he wants full aggressive treatment, bx, if stenting possible/ necessary he would want it.  And reassess treatment plans sp this.

## 2022-03-16 NOTE — PHYSICAL THERAPY INITIAL EVALUATION ADULT - PERTINENT HX OF CURRENT PROBLEM, REHAB EVAL
57y/o M pmhx IVDA, EtOH abuse, DM, HTN presents with abdominal pain and coffee ground emesis with CT findings of double duct sign and GOO 2/2 chronic pancreatitis vs pancreatic mass. NGT placed at bedside, scant coffee ground output.

## 2022-03-17 LAB
24R-OH-CALCIDIOL SERPL-MCNC: 23.1 NG/ML — LOW (ref 30–80)
ALBUMIN SERPL ELPH-MCNC: 1.6 G/DL — LOW (ref 3.3–5)
ALP SERPL-CCNC: 980 U/L — HIGH (ref 40–120)
ALT FLD-CCNC: 42 U/L — SIGNIFICANT CHANGE UP (ref 12–78)
ANION GAP SERPL CALC-SCNC: 6 MMOL/L — SIGNIFICANT CHANGE UP (ref 5–17)
AST SERPL-CCNC: 28 U/L — SIGNIFICANT CHANGE UP (ref 15–37)
BASOPHILS # BLD AUTO: 0 K/UL — SIGNIFICANT CHANGE UP (ref 0–0.2)
BASOPHILS NFR BLD AUTO: 0 % — SIGNIFICANT CHANGE UP (ref 0–2)
BILIRUB SERPL-MCNC: 1.1 MG/DL — SIGNIFICANT CHANGE UP (ref 0.2–1.2)
BUN SERPL-MCNC: 17 MG/DL — SIGNIFICANT CHANGE UP (ref 7–23)
CALCIUM SERPL-MCNC: 8.1 MG/DL — LOW (ref 8.5–10.1)
CANCER AG19-9 SERPL-ACNC: 174 U/ML — HIGH
CEA SERPL-MCNC: 0.8 NG/ML — SIGNIFICANT CHANGE UP (ref 0–3.8)
CHLORIDE SERPL-SCNC: 97 MMOL/L — SIGNIFICANT CHANGE UP (ref 96–108)
CO2 SERPL-SCNC: 27 MMOL/L — SIGNIFICANT CHANGE UP (ref 22–31)
CREAT SERPL-MCNC: 0.45 MG/DL — LOW (ref 0.5–1.3)
EGFR: 124 ML/MIN/1.73M2 — SIGNIFICANT CHANGE UP
EOSINOPHIL # BLD AUTO: 0 K/UL — SIGNIFICANT CHANGE UP (ref 0–0.5)
EOSINOPHIL NFR BLD AUTO: 0 % — SIGNIFICANT CHANGE UP (ref 0–6)
GLUCOSE SERPL-MCNC: 328 MG/DL — HIGH (ref 70–99)
HCT VFR BLD CALC: 31.2 % — LOW (ref 39–50)
HGB BLD-MCNC: 10.3 G/DL — LOW (ref 13–17)
IMM GRANULOCYTES NFR BLD AUTO: 0.4 % — SIGNIFICANT CHANGE UP (ref 0–1.5)
LACTATE SERPL-SCNC: 0.9 MMOL/L — SIGNIFICANT CHANGE UP (ref 0.7–2)
LYMPHOCYTES # BLD AUTO: 0.96 K/UL — LOW (ref 1–3.3)
LYMPHOCYTES # BLD AUTO: 8.6 % — LOW (ref 13–44)
MAGNESIUM SERPL-MCNC: 1.8 MG/DL — SIGNIFICANT CHANGE UP (ref 1.6–2.6)
MCHC RBC-ENTMCNC: 26.5 PG — LOW (ref 27–34)
MCHC RBC-ENTMCNC: 33 GM/DL — SIGNIFICANT CHANGE UP (ref 32–36)
MCV RBC AUTO: 80.4 FL — SIGNIFICANT CHANGE UP (ref 80–100)
MONOCYTES # BLD AUTO: 0.37 K/UL — SIGNIFICANT CHANGE UP (ref 0–0.9)
MONOCYTES NFR BLD AUTO: 3.3 % — SIGNIFICANT CHANGE UP (ref 2–14)
NEUTROPHILS # BLD AUTO: 9.81 K/UL — HIGH (ref 1.8–7.4)
NEUTROPHILS NFR BLD AUTO: 87.7 % — HIGH (ref 43–77)
PHOSPHATE SERPL-MCNC: 3 MG/DL — SIGNIFICANT CHANGE UP (ref 2.5–4.5)
PLATELET # BLD AUTO: 461 K/UL — HIGH (ref 150–400)
POTASSIUM SERPL-MCNC: 4.6 MMOL/L — SIGNIFICANT CHANGE UP (ref 3.5–5.3)
POTASSIUM SERPL-SCNC: 4.6 MMOL/L — SIGNIFICANT CHANGE UP (ref 3.5–5.3)
PROT SERPL-MCNC: 6.9 GM/DL — SIGNIFICANT CHANGE UP (ref 6–8.3)
RBC # BLD: 3.88 M/UL — LOW (ref 4.2–5.8)
RBC # FLD: 14.6 % — HIGH (ref 10.3–14.5)
SODIUM SERPL-SCNC: 130 MMOL/L — LOW (ref 135–145)
TROPONIN I, HIGH SENSITIVITY RESULT: 4.4 NG/L — SIGNIFICANT CHANGE UP
TROPONIN I, HIGH SENSITIVITY RESULT: 5.76 NG/L — SIGNIFICANT CHANGE UP
WBC # BLD: 11.18 K/UL — HIGH (ref 3.8–10.5)
WBC # FLD AUTO: 11.18 K/UL — HIGH (ref 3.8–10.5)

## 2022-03-17 PROCEDURE — 99232 SBSQ HOSP IP/OBS MODERATE 35: CPT

## 2022-03-17 PROCEDURE — 71260 CT THORAX DX C+: CPT | Mod: 26

## 2022-03-17 PROCEDURE — 99233 SBSQ HOSP IP/OBS HIGH 50: CPT

## 2022-03-17 PROCEDURE — 93010 ELECTROCARDIOGRAM REPORT: CPT

## 2022-03-17 RX ORDER — ELECTROLYTE SOLUTION,INJ
1 VIAL (ML) INTRAVENOUS
Refills: 0 | Status: DISCONTINUED | OUTPATIENT
Start: 2022-03-17 | End: 2022-03-17

## 2022-03-17 RX ORDER — I.V. FAT EMULSION 20 G/100ML
0.82 EMULSION INTRAVENOUS
Qty: 50.18 | Refills: 0 | Status: DISCONTINUED | OUTPATIENT
Start: 2022-03-17 | End: 2022-03-17

## 2022-03-17 RX ORDER — I.V. FAT EMULSION 20 G/100ML
250 EMULSION INTRAVENOUS ONCE
Refills: 0 | Status: DISCONTINUED | OUTPATIENT
Start: 2022-03-17 | End: 2022-03-17

## 2022-03-17 RX ORDER — PIPERACILLIN AND TAZOBACTAM 4; .5 G/20ML; G/20ML
3.38 INJECTION, POWDER, LYOPHILIZED, FOR SOLUTION INTRAVENOUS EVERY 8 HOURS
Refills: 0 | Status: DISCONTINUED | OUTPATIENT
Start: 2022-03-17 | End: 2022-03-18

## 2022-03-17 RX ORDER — INSULIN LISPRO 100/ML
VIAL (ML) SUBCUTANEOUS EVERY 6 HOURS
Refills: 0 | Status: DISCONTINUED | OUTPATIENT
Start: 2022-03-17 | End: 2022-03-25

## 2022-03-17 RX ORDER — I.V. FAT EMULSION 20 G/100ML
0.82 EMULSION INTRAVENOUS
Qty: 50 | Refills: 0 | Status: DISCONTINUED | OUTPATIENT
Start: 2022-03-17 | End: 2022-03-17

## 2022-03-17 RX ORDER — PANTOPRAZOLE SODIUM 20 MG/1
40 TABLET, DELAYED RELEASE ORAL EVERY 12 HOURS
Refills: 0 | Status: DISCONTINUED | OUTPATIENT
Start: 2022-03-17 | End: 2022-03-25

## 2022-03-17 RX ADMIN — PIPERACILLIN AND TAZOBACTAM 25 GRAM(S): 4; .5 INJECTION, POWDER, LYOPHILIZED, FOR SOLUTION INTRAVENOUS at 15:14

## 2022-03-17 RX ADMIN — PIPERACILLIN AND TAZOBACTAM 25 GRAM(S): 4; .5 INJECTION, POWDER, LYOPHILIZED, FOR SOLUTION INTRAVENOUS at 03:38

## 2022-03-17 RX ADMIN — Medication 1 MILLIGRAM(S): at 11:05

## 2022-03-17 RX ADMIN — Medication 100 MILLIGRAM(S): at 11:06

## 2022-03-17 RX ADMIN — MORPHINE SULFATE 6 MILLIGRAM(S): 50 CAPSULE, EXTENDED RELEASE ORAL at 09:05

## 2022-03-17 RX ADMIN — Medication 6: at 12:25

## 2022-03-17 RX ADMIN — METHADONE HYDROCHLORIDE 60 MILLIGRAM(S): 40 TABLET ORAL at 11:10

## 2022-03-17 RX ADMIN — MORPHINE SULFATE 6 MILLIGRAM(S): 50 CAPSULE, EXTENDED RELEASE ORAL at 06:05

## 2022-03-17 RX ADMIN — Medication 1 TABLET(S): at 11:05

## 2022-03-17 RX ADMIN — MORPHINE SULFATE 8 MILLIGRAM(S): 50 CAPSULE, EXTENDED RELEASE ORAL at 19:36

## 2022-03-17 RX ADMIN — Medication 10 MILLIGRAM(S): at 11:07

## 2022-03-17 RX ADMIN — PANTOPRAZOLE SODIUM 40 MILLIGRAM(S): 20 TABLET, DELAYED RELEASE ORAL at 20:21

## 2022-03-17 RX ADMIN — MORPHINE SULFATE 6 MILLIGRAM(S): 50 CAPSULE, EXTENDED RELEASE ORAL at 05:38

## 2022-03-17 RX ADMIN — ZOLPIDEM TARTRATE 5 MILLIGRAM(S): 10 TABLET ORAL at 23:20

## 2022-03-17 RX ADMIN — PIPERACILLIN AND TAZOBACTAM 25 GRAM(S): 4; .5 INJECTION, POWDER, LYOPHILIZED, FOR SOLUTION INTRAVENOUS at 20:21

## 2022-03-17 RX ADMIN — PANTOPRAZOLE SODIUM 10 MG/HR: 20 TABLET, DELAYED RELEASE ORAL at 04:41

## 2022-03-17 RX ADMIN — Medication 8: at 05:48

## 2022-03-17 RX ADMIN — PANTOPRAZOLE SODIUM 10 MG/HR: 20 TABLET, DELAYED RELEASE ORAL at 14:26

## 2022-03-17 RX ADMIN — MORPHINE SULFATE 8 MILLIGRAM(S): 50 CAPSULE, EXTENDED RELEASE ORAL at 19:51

## 2022-03-17 RX ADMIN — Medication 10: at 23:34

## 2022-03-17 RX ADMIN — Medication 1 CAPSULE(S): at 17:47

## 2022-03-17 NOTE — PROGRESS NOTE ADULT - SUBJECTIVE AND OBJECTIVE BOX
Patient is a 56y old  Male who presents with a chief complaint of abdominal pain-- transfer for possible ERCP.    HPI : 57 y/o M admitted for abdominal pain. No acute events overnight, continues to have pain accompanied by nausea at times. Pending nutrition eval for initiation of tube feedings. Continues to feel weak and tired. Pt denies headache, dizziness, chest pain, palpitations, cough, SOB, fevers, chills at this time.     MEDICATIONS  (STANDING):  bisacodyl Suppository 10 milliGRAM(s) Rectal <User Schedule>  dextrose 40% Gel 15 Gram(s) Oral once  dextrose 5%. 1000 milliLiter(s) (50 mL/Hr) IV Continuous <Continuous>  dextrose 5%. 1000 milliLiter(s) (100 mL/Hr) IV Continuous <Continuous>  dextrose 50% Injectable 25 Gram(s) IV Push once  dextrose 50% Injectable 12.5 Gram(s) IV Push once  dextrose 50% Injectable 25 Gram(s) IV Push once  folic acid 1 milliGRAM(s) Oral daily  glucagon  Injectable 1 milliGRAM(s) IntraMuscular once  insulin lispro (ADMELOG) corrective regimen sliding scale   SubCutaneous every 6 hours  methadone    Tablet 60 milliGRAM(s) Oral daily  multivitamin 1 Tablet(s) Oral daily  pancrelipase (ZENPEP 20,000 Lipase Units) 1 Capsule(s) Oral three times a day with meals  pantoprazole Infusion 8 mG/Hr (10 mL/Hr) IV Continuous <Continuous>  Parenteral Nutrition - Adult 1 Each (75 mL/Hr) TPN Continuous <Continuous>  piperacillin/tazobactam IVPB.. 3.375 Gram(s) IV Intermittent every 8 hours  thiamine 100 milliGRAM(s) Oral daily    MEDICATIONS  (PRN):  acetaminophen     Tablet .. 650 milliGRAM(s) Oral every 6 hours PRN Temp greater or equal to 38C (100.4F), Mild Pain (1 - 3)  ALBUTerol    90 MICROgram(s) HFA Inhaler 2 Puff(s) Inhalation every 6 hours PRN Bronchospasm  aluminum hydroxide/magnesium hydroxide/simethicone Suspension 30 milliLiter(s) Oral every 4 hours PRN Dyspepsia  melatonin 3 milliGRAM(s) Oral at bedtime PRN Insomnia  morphine  - Injectable 8 milliGRAM(s) IV Push every 3 hours PRN Severe Pain (7 - 10)  morphine  - Injectable 6 milliGRAM(s) IV Push every 3 hours PRN Moderate Pain (4 - 6)  ondansetron Injectable 4 milliGRAM(s) IV Push every 8 hours PRN Nausea and/or Vomiting  zolpidem 5 milliGRAM(s) Oral at bedtime PRN Insomnia    Vital Signs Last 24 Hrs  T(C): 36.3 (17 Mar 2022 08:25), Max: 36.9 (16 Mar 2022 17:45)  T(F): 97.4 (17 Mar 2022 08:25), Max: 98.4 (16 Mar 2022 17:45)  HR: 69 (17 Mar 2022 08:25) (61 - 81)  BP: 155/77 (17 Mar 2022 08:25) (143/73 - 191/94)  BP(mean): --  RR: 18 (17 Mar 2022 08:25) (12 - 18)  SpO2: 100% (17 Mar 2022 08:25) (94% - 100%)    PHYSICAL EXAM:  Constitutional: chronically ill appearing, thin, frail  HEENT: EOMI, MMM  Respiratory: CTAB  Cardiovascular: S1 and S2, RRR, no M/G/R  Gastrointestinal: BS+, nondistended, diffuse abdominal tenderness to light palpation, no rebound  Extremities: No peripheral edema  Vascular: 2+ peripheral pulses  Neurological: A/O x 3, no focal deficits  Psychiatric: Normal mood, normal affect  Skin: No rashes, sacral decubitus    LABS:                        10.3   11.18 )-----------( 461      ( 17 Mar 2022 05:04 )             31.2     03-17    130<L>  |  97  |  17  ----------------------------<  328<H>  4.6   |  27  |  0.45<L>    Ca    8.1<L>      17 Mar 2022 05:04  Phos  3.0     03-17  Mg     1.8     03-17    TPro  6.9  /  Alb  1.6<L>  /  TBili  1.1  /  DBili  x   /  AST  28  /  ALT  42  /  AlkPhos  980<H>  03-17      LIVER FUNCTIONS - ( 17 Mar 2022 05:04 )  Alb: 1.6 g/dL / Pro: 6.9 gm/dL / ALK PHOS: 980 U/L / ALT: 42 U/L / AST: 28 U/L / GGT: x             RADIOLOGY & ADDITIONAL STUDIES: reviewed Patient is a 56y old  Male who presents with a chief complaint of abdominal pain-- transfer for possible ERCP.    HPI : 57 y/o M admitted for abdominal pain. No acute events overnight, continues to have pain accompanied by nausea at times. Pending nutrition eval for initiation of tube feedings. Continues to feel weak and tired. Pt denies headache, dizziness, chest pain, palpitations, cough, SOB, fevers, chills at this time.     MEDICATIONS  (STANDING):  bisacodyl Suppository 10 milliGRAM(s) Rectal <User Schedule>  dextrose 40% Gel 15 Gram(s) Oral once  dextrose 5%. 1000 milliLiter(s) (50 mL/Hr) IV Continuous <Continuous>  dextrose 5%. 1000 milliLiter(s) (100 mL/Hr) IV Continuous <Continuous>  dextrose 50% Injectable 25 Gram(s) IV Push once  dextrose 50% Injectable 12.5 Gram(s) IV Push once  dextrose 50% Injectable 25 Gram(s) IV Push once  folic acid 1 milliGRAM(s) Oral daily  glucagon  Injectable 1 milliGRAM(s) IntraMuscular once  insulin lispro (ADMELOG) corrective regimen sliding scale   SubCutaneous every 6 hours  methadone    Tablet 60 milliGRAM(s) Oral daily  multivitamin 1 Tablet(s) Oral daily  pancrelipase (ZENPEP 20,000 Lipase Units) 1 Capsule(s) Oral three times a day with meals  pantoprazole Infusion 8 mG/Hr (10 mL/Hr) IV Continuous <Continuous>  Parenteral Nutrition - Adult 1 Each (75 mL/Hr) TPN Continuous <Continuous>  piperacillin/tazobactam IVPB.. 3.375 Gram(s) IV Intermittent every 8 hours  thiamine 100 milliGRAM(s) Oral daily    MEDICATIONS  (PRN):  acetaminophen     Tablet .. 650 milliGRAM(s) Oral every 6 hours PRN Temp greater or equal to 38C (100.4F), Mild Pain (1 - 3)  ALBUTerol    90 MICROgram(s) HFA Inhaler 2 Puff(s) Inhalation every 6 hours PRN Bronchospasm  aluminum hydroxide/magnesium hydroxide/simethicone Suspension 30 milliLiter(s) Oral every 4 hours PRN Dyspepsia  melatonin 3 milliGRAM(s) Oral at bedtime PRN Insomnia  morphine  - Injectable 8 milliGRAM(s) IV Push every 3 hours PRN Severe Pain (7 - 10)  morphine  - Injectable 6 milliGRAM(s) IV Push every 3 hours PRN Moderate Pain (4 - 6)  ondansetron Injectable 4 milliGRAM(s) IV Push every 8 hours PRN Nausea and/or Vomiting  zolpidem 5 milliGRAM(s) Oral at bedtime PRN Insomnia    Vital Signs Last 24 Hrs  T(C): 36.3 (17 Mar 2022 08:25), Max: 36.9 (16 Mar 2022 17:45)  T(F): 97.4 (17 Mar 2022 08:25), Max: 98.4 (16 Mar 2022 17:45)  HR: 69 (17 Mar 2022 08:25) (61 - 81)  BP: 155/77 (17 Mar 2022 08:25) (143/73 - 191/94)  BP(mean): --  RR: 18 (17 Mar 2022 08:25) (12 - 18)  SpO2: 100% (17 Mar 2022 08:25) (94% - 100%)    PHYSICAL EXAM:  Constitutional: chronically ill appearing, thin, frail  HEENT: EOMI, MMM  Respiratory: CTAB  Cardiovascular: S1 and S2, RRR, no M/G/R  Gastrointestinal: BS+, nondistended, diffuse abdominal tenderness to light palpation, no rebound, new GJ tube present to left abdomen, dressing with minimal amount of blood.   Extremities: No peripheral edema  Vascular: 2+ peripheral pulses  Neurological: A/O x 3, no focal deficits  Psychiatric: Normal mood, normal affect  Skin: No rashes, sacral decubitus    LABS:                        10.3   11.18 )-----------( 461      ( 17 Mar 2022 05:04 )             31.2     03-17    130<L>  |  97  |  17  ----------------------------<  328<H>  4.6   |  27  |  0.45<L>    Ca    8.1<L>      17 Mar 2022 05:04  Phos  3.0     03-17  Mg     1.8     03-17    TPro  6.9  /  Alb  1.6<L>  /  TBili  1.1  /  DBili  x   /  AST  28  /  ALT  42  /  AlkPhos  980<H>  03-17      LIVER FUNCTIONS - ( 17 Mar 2022 05:04 )  Alb: 1.6 g/dL / Pro: 6.9 gm/dL / ALK PHOS: 980 U/L / ALT: 42 U/L / AST: 28 U/L / GGT: x             RADIOLOGY & ADDITIONAL STUDIES: reviewed

## 2022-03-17 NOTE — PROGRESS NOTE ADULT - ASSESSMENT
Assessment:   56 year old male phx of IVDA with heroin last used 2 months  ago as per patient, opioid dependence on methadone, IDDM, chronic pancreatitis,  and recent significant weight loss over admitted for panreatitis, found to have  pancreatic mass.      Process of Care  --Reviewed dx/treatment problems and alignment with Goals of Care    Physical Aspects of Care  --Pain  patient denies at this time  6/10 at this morning  c/w Morphine 6mg IV [id number] PRN for moderate pain  c/w Morphine 8mg IV [id number] PRN for severe pain  c/w Tylenol 1000mg TID IV x 3 days if remains NPO (can convert to PO once tolerated)  pt w/ hx of IVDA which puts him at high risk BUT d/t medical issues his pain will necessitate opiate regimens at least in patient.  Risk and benefits discussed w/ patient and so was above plan  Also once safe to start NSAIDs can add PO Motrin 600mg TID PRN or Ketorlac dose to help opiate sparing.  This in combination w/ acetaminophen and staggered can help a great deal .   Once Pt is able to take PO will need to start converting pain regimen to PO or Gtube for DC possibilities.       --Bowel Regimen  +constipation  risk for constipation d/t immobility and opiates   Dulcolax suppository [id number] hold for BMS    --Dyspnea  No SOB at this time  comfortable and in NAD    --Nausea Vomiting  denies    --Weakness  PT as tolerated     Psychological and Psychiatric Aspects of Care:   --Greif/Bereavment: emotional support provided  --Hx of psychiatric dx: none  -Pastoral Care Available PRN     Social Aspects of Care  -SW involved     Cultural Aspects  -Primary Language: English    Goals of Care:     We discussed Palliative Care team being a supportive team when a patient has ongoing illnesses.  We also discussed that it is not an end of life care service, but can help navigate symptoms and emotional support througout their hospital stay here.    curretnly FULL CODE   GOC note above.    Ethical and Legal Aspects:   NA    Capacity- pt w/ capacity for complex decisions    HCP/Surrogate: Eusebio Pugh (brother)  Code Status- FULL   MOLST- NA  Dispo Plan-  Discussed With:  Case coordinated with attending and SW and RN     Time Spent: 75 minutes including the care, coordination and counseling of this patient, 50% of which was spent coordinating and counseling.

## 2022-03-17 NOTE — ADVANCED PRACTICE NURSE CONSULT - RECOMMEDATIONS
1)Continue to Elevate heels off of Mattress  2)Continue to Turn and position every 2 Hours  3)Sacral Wound: Apply Foam dressing for protection. May place Triad on wound prn  4)Maintain Patient on Low air Loss Mattress.   5) No Diapering and use only 1 - pad under patient

## 2022-03-17 NOTE — PROGRESS NOTE ADULT - SUBJECTIVE AND OBJECTIVE BOX
HPI:    HPI:    Mr. Pugh is a 56 year old male phx of IVDA with heroin last used 2 months  ago as per patient, opioid dependence on methadone (states for 27 years??), IDDM, chronic pancreatitis, and recent significant weight loss over admitted for panreatitis, found to have pancreatic mass.     3/16/22  Pt seen and examined bed side, NGT inplace to suciton. Pt in signficant pain this morning last dose of Morphine 4mg was at 7am and I saw him closer to 11.  He states the 4mg had little to no effect.  We discussed options for pain managment now and then possibly moving forward if symptoms do not alleviate.     He is frustrated that he's so sick, he wishes he could pull the NGT and go home.  He has some underlying diagnosis of anxiety/depression he states -- he doesn't know what psych meds he's on and doesn't have his cellphone to give us contact info on outpatient psych.      As per SW note:  Hx of Patient's residence is in the [place], was [facility] from  [facility] to [facility] in [place] for ASHLEY. Brother is  Eusebio .     Patient told me that Eusebio would be his HCP if he's unable to make any medical decisions.       3/17/22  pt seen and examined  pain is better controlled "not perfect" but better.  He c/o hunger but remains NPO at this time  on exam he does appear more comfortable and is even more conversive now w/ his pain control.         PAIN: (x )Yes   ( )No  Level: moderate  Location: abdomen , epigastric  Intensity:    6/10  Quality: gnawing, shooting  Aggravating Factors: po intake   Alleviating Factors: medication  Radiation: to the back  Duration/Timing: constant  Impact on ADLs: significant    DYSPNEA: ( ) Yes  ( xx) No  Level:        Review of Systems:    Anxiety- c/o signficant anxiety pt w/ insomnia but is NPO was unable to receive Ambien   Depression- sad and states hx of depression unknown meds at home he states  Physical Discomfort- improved some, more comfortable  Dyspnea- none   Constipation- none  Nausea- ++   Vomiting- not at this time  Anorexia- pt hungry today  Weight Loss- significant   Cough- none  Secretions- none  Fatigue- +  insomnia  Weakness- ++  Delirium- none    All other systems reviewed and negative          PHYSICAL EXAM:    Vital Signs Last 24 Hrs  T(C): 36.3 (17 Mar 2022 08:25), Max: 36.9 (16 Mar 2022 17:45)  T(F): 97.4 (17 Mar 2022 08:25), Max: 98.4 (16 Mar 2022 17:45)  HR: 69 (17 Mar 2022 08:25) (61 - 82)  BP: 155/77 (17 Mar 2022 08:25) (143/73 - 191/94)  BP(mean): --  RR: 18 (17 Mar 2022 08:25) (12 - 18)  SpO2: 100% (17 Mar 2022 08:25) (94% - 100%)  Daily     Daily     PPSV2:30   %  FAST:    General: cachectic, severe frailty   Mental Status: a+Ox3  HEENT: EOMI PERRL  Lungs: ctabl bl  Cardiac: s1s2  GI: abdomen w/ some tenderness  : voids  Ext: moves all 4 extremities spontaneously  Neuro: follows commands        LABS:                        10.3   11.18 )-----------( 461      ( 17 Mar 2022 05:04 )             31.2     03-17    130<L>  |  97  |  17  ----------------------------<  328<H>  4.6   |  27  |  0.45<L>    Ca    8.1<L>      17 Mar 2022 05:04  Phos  3.0     03-17  Mg     1.8     03-17    TPro  6.9  /  Alb  1.6<L>  /  TBili  1.1  /  DBili  x   /  AST  28  /  ALT  42  /  AlkPhos  980<H>  03-17      Albumin: Albumin, Serum: 1.6 g/dL (03-17 @ 05:04)      Allergies    No Known Allergies    Intolerances      MEDICATIONS  (STANDING):  dextrose 40% Gel 15 Gram(s) Oral once  dextrose 5%. 1000 milliLiter(s) (50 mL/Hr) IV Continuous <Continuous>  dextrose 5%. 1000 milliLiter(s) (100 mL/Hr) IV Continuous <Continuous>  dextrose 50% Injectable 25 Gram(s) IV Push once  dextrose 50% Injectable 12.5 Gram(s) IV Push once  dextrose 50% Injectable 25 Gram(s) IV Push once  folic acid 1 milliGRAM(s) Oral daily  glucagon  Injectable 1 milliGRAM(s) IntraMuscular once  insulin lispro (ADMELOG) corrective regimen sliding scale   SubCutaneous every 6 hours  methadone    Tablet 60 milliGRAM(s) Oral daily  multivitamin 1 Tablet(s) Oral daily  pancrelipase (ZENPEP 20,000 Lipase Units) 1 Capsule(s) Oral three times a day with meals  pantoprazole Infusion 8 mG/Hr (10 mL/Hr) IV Continuous <Continuous>  Parenteral Nutrition - Adult 1 Each (75 mL/Hr) TPN Continuous <Continuous>  piperacillin/tazobactam IVPB.. 3.375 Gram(s) IV Intermittent every 8 hours  thiamine 100 milliGRAM(s) Oral daily    MEDICATIONS  (PRN):  acetaminophen     Tablet .. 650 milliGRAM(s) Oral every 6 hours PRN Temp greater or equal to 38C (100.4F), Mild Pain (1 - 3)  ALBUTerol    90 MICROgram(s) HFA Inhaler 2 Puff(s) Inhalation every 6 hours PRN Bronchospasm  aluminum hydroxide/magnesium hydroxide/simethicone Suspension 30 milliLiter(s) Oral every 4 hours PRN Dyspepsia  melatonin 3 milliGRAM(s) Oral at bedtime PRN Insomnia  morphine  - Injectable 8 milliGRAM(s) IV Push every 3 hours PRN Severe Pain (7 - 10)  morphine  - Injectable 6 milliGRAM(s) IV Push every 3 hours PRN Moderate Pain (4 - 6)  ondansetron Injectable 4 milliGRAM(s) IV Push every 8 hours PRN Nausea and/or Vomiting  zolpidem 5 milliGRAM(s) Oral at bedtime PRN Insomnia      RADIOLOGY:

## 2022-03-17 NOTE — ADVANCED PRACTICE NURSE CONSULT - ASSESSMENT
This is a 56 year male admitted on 3/14/2022 for pancreatitis with a PMHx: of pancreatic Mass. Assessed patient on 1 North and lying on left side. Patient appears malnourished, and all bony prominences are a risk for breakdown. Patient on PPN with Lipids and also has  a PEG with no tube feeding at this time.   Sacral Wound measures 5cm x 4cm x 0.1cm with 30% yellow slough and 70% of pink wound bed. At this time the goal is to protect skin and wound from further breakdown. Patient should be maintain on Low Air Loss mattress, 1- Purple pad under patient to Wick Away moisture, turn and every two hours and keep heels elevated. Patient already has pink pads over all bony prominences.

## 2022-03-17 NOTE — CHART NOTE - NSCHARTNOTEFT_GEN_A_CORE
HPI: 57 y/o male with PMHX of IVDA with heroin-- last used 2 months ago as per patient, opioid dependence on methadone, IDDM, chronic pancreatitis, and recent significant weight loss over the last 1 year who was a transfer from [facility] ([place]) for possible ERCP.  Patient initially presented to outside hospital for worsening abdominal pain.  Patient had CT/ MRI/ US of the abdomen which showed chronic pancreatitis, gastric distention (for which he had an NGT),  intra/ extra hepatic biliary ductal dilatation, and fluid collection/ ? mass in the head of the pancreas compressing the SMV and proximal portal vein.  Patient was transferred to  for possible need for ERCP and further GI evaluation.  Upon admission, patient with significantly elevated ALKPHOS 1200. [14-Mar-2022 11:54]      PERTINENT PMH REVIEWED:  [x ] YES [ ] NO            Primary Contact:  brother Eusebio ([phone])    HCP [  ] Surrogate [x] Guardian [   ]     Mental Status: Alert  [ x ] Oriented [ x ] Confused [  ] Lethargic [  ]   Concerns of Depression [ x ]   Pt reports hx of anxiety/depression & shares that he sees a psychiatrist outpt  Anxiety [  x ]   Baseline ADLs (prior to admission):   Independent [ ] moderately [ ] fully    Dependent   [x ] moderately [ ]fully     Family Meeting attendees:  Patient with capacity to make own decisions & participate in own GOC discussion.     Anticipated Grief: Patient[ x ] Family [  ]     Caregiver [place] Assessed: Yes [ x ] No [  ]     Tenriism:  Not identified.     Spiritual Concerns: None identified.  available.      Goals of Care:  Pt wishes to pursue treatment at this time. He hopes to have NGT removed.     Previous Services:  [facility], [facility]    ADVANCE DIRECTIVES:   None on chart or OneContent.   - No limits set at this time.     Anticipated D/C Plan:  [facility]    Summary: SW met with patient to introduce self & offer support. Palliative SW role explained. Pt in bed, appears alert, oriented & able to make needs known; requested a blanket. Pt confirms that he was in [facility] prior to hospitalization. He shares that he is "hungry" and just wants something to eat. Pt currently NPO with NGT in place. Pt shares that he has hx of anxiety/depression & does see a psychiatrist in the community. Pt also reports having some trouble sleeping. SW spoke to RN who confirms he has Melatonin & Ambien ordered; SW reminded pt of this. Patient reports that he has a supportive brother Eusebio ([phone]) who he gives our team permission to speak with. Emotional support provided.     Pt reports that plan is to return to [facility]. No limits set at this time. Our team will continue to follow. HPI: 57 y/o male with PMHX of IVDA with heroin-- last used 2 months ago as per patient, opioid dependence on methadone, IDDM, chronic pancreatitis, and recent significant weight loss over the last 1 year who was a transfer from [facility] ([place]) for possible ERCP.  Patient initially presented to outside hospital for worsening abdominal pain.  Patient had CT/ MRI/ US of the abdomen which showed chronic pancreatitis, gastric distention (for which he had an NGT),  intra/ extra hepatic biliary ductal dilatation, and fluid collection/ ? mass in the head of the pancreas compressing the SMV and proximal portal vein.  Patient was transferred to  for possible need for ERCP and further GI evaluation.  Upon admission, patient with significantly elevated ALKPHOS 1200. [14-Mar-2022 11:54]      PERTINENT PMH REVIEWED:  [x ] YES [ ] NO            Primary Contact:  brother Eusebio ([phone])    HCP [  ] Surrogate [x] Guardian [   ]     Mental Status: Alert  [ x ] Oriented [ x ] Confused [  ] Lethargic [  ]   Concerns of Depression [ x ]   Pt reports hx of anxiety/depression & shares that he sees a psychiatrist outpt  Anxiety [  x ]   Baseline ADLs (prior to admission):   Independent [ ] moderately [ ] fully    Dependent   [x ] moderately [ ]fully     Family Meeting attendees:  Patient with capacity to make own decisions & participate in own GOC discussion.     Anticipated Grief: Patient[ x ] Family [  ]     Caregiver [place] Assessed: Yes [ x ] No [  ]     Samaritan:  Not identified.     Spiritual Concerns: None identified.  available.      Goals of Care:  Pt wishes to pursue treatment at this time. He hopes to have NGT removed.     Previous Services:  [facility], [facility]    ADVANCE DIRECTIVES:   None on chart or OneContent.   - No limits set at this time.     Anticipated D/C Plan:  [facility]    Summary: SW met with patient to introduce self & offer support. Palliative SW role explained. Pt in bed, appears alert, oriented & able to make needs known; requested a blanket. Pt confirms that he was in [facility] prior to hospitalization. He shares that he is "hungry" and just wants something to eat. Pt currently NPO with NGT in place. Pt shares that he has hx of anxiety/depression & does see a psychiatrist in the community. Pt also reports having some trouble sleeping. SW spoke to RN who confirms he has Melatonin & Ambien ordered; SW reminded pt of this. Patient reports that he has a supportive brother Eusebio who he gives our team permission to speak with. Emotional support provided. Message left for brother Eusebio ([phone]).    Pt reports that plan is to return to [facility]. No limits set at this time. Our team will continue to follow.

## 2022-03-17 NOTE — PROGRESS NOTE ADULT - ASSESSMENT
57 y/o male with hx of IVDA with heroin last used 2 months ago as per patient, opioid dependence on methadone, IDDM, chronic pancreatitis, and recent significant weight loss over the last 1 year who was a transfer from [facility] ([place]) for management. He continues to have diffuse upper abdominal pain likely due to chronic pancreatitis. He is s/p EUS/ERCP which showed severely inflamed duodenum from chronic pancreatitis. No gastric outlet obstruction but symptoms likely due to severe inflammation. G-J tube placed. EUS with severe chronic pancreatitis. Large amount of calcifications in the head making evaluation for mass almost impossible. S/p FNB and sent for pathology. ERCP with inability to cross wire into distal pancreas due to stones. Pancreatic sphincterotomy performed for decompression. 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    PLAN  Pending nutrition eval for tube feedings   GJ tube: G tube /Suction port for meds and for venting, J tube/Feeds port for feeds only, Rx port never to be used.  Pain control PRN  Antiemetics PRN  Supportive care    Discussed with Dr. Haley

## 2022-03-17 NOTE — CHART NOTE - NSCHARTNOTEFT_GEN_A_CORE
Clinical Nutrition PPN Follow Up Note    *56 year old male admitted for acute pancreatitis without infection or necrosis. Pt w/ PMH IVDA w/ heroin, opioid dependence (on methadone), IDDM, chronic pancreatitis w/ possible mass on abdomen, recent significant wt loss x 1 year. Transfer from [facility] - admitted for worsening abdominal pain.     *current status: Went for EUS and ERCP yesterday. As per brief operative note, EUS found severe chronic pancreatitis w/ large amounts of calcifications in the head. ERCP unable to cross wire into distal pancreas 2/2 stones. 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 s/p NGT and s/p GJ tube. C/w PPN and d/c IV fluids. Reports severe abdominal pain 2/2 pancreatic mass associated with biliary and pancreatic duct dilatation, w/ constipation.    *labs reviewed; sodium remains low - increase Na by 15 mEq. Mg remains on low end, no changes. Potassium, phos, chloride, bilirubin, total WNL - no changes made. Corrected Ca at 10.0 mg/dL - no changes made. POCTs remain high: 335 mg/dL, 254 mg/dL, 140 mg/dL, 127 mg/dL. Current 5 U insulin in bag, increase to 7 U insulin in bag. Triglycerides 40 mg/dL - start w/ 50 g.      03-17    130<L>  |  97  |  17  ----------------------------<  150<H>  4.6   |  27  |  0.45<L>    Ca    8.1<L>      17 Mar 2022 05:02  Phos  3.0     03-1  Mg     1.8     03-17    TPro  6.9  /  Alb  1.6<L>  /  TBili  1.1  /  DBili  1.3<H>  /  AST  28  /  ALT  42  /  AlkPhos  980<H>  03-17      BMI: BMI (kg/m2): 21.8 (03-14-22 @ 05:19)  HbA1c: A1C with Estimated Average Glucose Result: 9.9 % (03-15-22 @ 07:30)    Glucose: POCT Blood Glucose.: 335 mg/dL (03.17.22 @ 05:45)  POCT Blood Glucose.: 254 mg/dL (03.16.22 @ 22:51)  POCT Blood Glucose.: 140 mg/dL (03.16.22 @ 18:41)  POCT Blood Glucose.: 127 mg/dL (03.16.22 @ 12:00)    BP: 154/85 (03-15-22 @ 23:07) (131/67 - 184/96)  Lipid Panel: triglycerides: 40 mg/dL   (03-16-22 @ 05:42)    *pertinent meds: methadone, insulin lispro, MVI, pancrelipase, thiamine, folic acid, albuterol, zofran    *I&O's Detail    15 Mar 2022 07:01  -  16 Mar 2022 07:00  --------------------------------------------------------  IN:    Pantoprazole: 60 mL    PRBCs (Packed Red Blood Cells): 571 mL    sodium chloride 0.9%: 500 mL  Total IN: 1131 mL    OUT:    Voided (mL): 1600 mL  Total OUT: 1600 mL    Total NET: -469 mL    * fluid status negative, PPN not documented. Will continue to monitor.     *Pt w/o BM.  pt not on bowel regimen.    *Douglas Score of 16; Stage III sacrum PU documented. No edema documented.    * Malnutrition: Pt meets criteria for severe malnutrition in acute on chronic illness r/t decreased PO intake 2/2 altered GI function (GOO) on pancreatitis AEB 55% wt loss x 1 yr; meeting <50% of ENN chronically; severe muscle/fat wasting.    Estimated Needs: based on 39Kg (wt from bed scale wt obtained by RD on 3/15)  Calories: 8420-9608 Kcal (35-40 Kcal/Kg)  Protein: 70.2-78 g (1.8-2.0 g/Kg)  Fluids: 4388-8957 mL (35-40 mL/Kg)    Weight History:  Height (cm): 167.6 (03-14-22 @ 05:19)  Weight (kg): 61.2 (03-14-22 @ 05:19)  BMI (kg/m2): 21.8 (03-14-22 @ 05:19)  BSA (m2): 1.69 (03-14-22 @ 05:19)  IBW: 172#    PPN Recommendations: via peripheral venous line  Total Volume: 1800  65 g  Amino Acids  100 g Dextrose  50 g Lipids 20%  114 mEq Sodium Chloride  17 mEq Sodium Acetate  25 mmol Sodium Phosphate  44 mEq Potassium Chloride  26 mEq Potassium Acetate  0 mmol Potassium Phosphate  0 mEq Calcium Gluconate  10 mEq Magnesium Sulfate  100 mg Thiamine  7 units regular insulin  1 ml Trace   10 ml MVI    Total Calories: 600 Kcal     (Meets 44% of Estimated Energy needs and 93%  Protein needs) (osmolarity <900)    Additional Recommendations:    1) Daily weights to track/trend changes  2) Strict I & O's  3) Daily lyte checks  4) Weekly triglycerides/LFT checks  5) Monitor BM; provide bowel regimen prn.    *will continue to monitor and adjust PN prn*  Rachel Olmos, Dietetic Intern Clinical Nutrition PPN Follow Up Note    *56 year old male admitted for acute pancreatitis without infection or necrosis. Pt w/ PMH IVDA w/ heroin, opioid dependence (on methadone), IDDM, chronic pancreatitis w/ possible mass on abdomen, recent significant wt loss x 1 year. Transfer from [facility] - admitted for worsening abdominal pain.     *current status: Went for EUS and ERCP yesterday. As per brief operative note, EUS found severe chronic pancreatitis w/ large amounts of calcifications in the head. ERCP unable to cross wire into distal pancreas 2/2 stones. 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 s/p NGT and s/p GJ tube. C/w PPN and d/c IV fluids. Reports severe abdominal pain 2/2 pancreatic mass associated with biliary and pancreatic duct dilatation, w/ constipation.    *labs reviewed; sodium remains low - increase Na by 15 mEq. Mg remains on low end, no changes. Potassium, phos, chloride, bilirubin, total WNL - no changes made. Corrected Ca at 10.0 mg/dL - no changes made. POCTs remain high: 335 mg/dL, 254 mg/dL, 140 mg/dL, 127 mg/dL. Current 5 U insulin in bag, increase to 7 U insulin in bag. Triglycerides 40 mg/dL - start w/ 50 g.      03-17    130<L>  |  97  |  17  ----------------------------<  150<H>  4.6   |  27  |  0.45<L>    Ca    8.1<L>      17 Mar 2022 05:02  Phos  3.0     03-1  Mg     1.8     03-17    TPro  6.9  /  Alb  1.6<L>  /  TBili  1.1  /  DBili  1.3<H>  /  AST  28  /  ALT  42  /  AlkPhos  980<H>  03-17      BMI: BMI (kg/m2): 21.8 (03-14-22 @ 05:19)  HbA1c: A1C with Estimated Average Glucose Result: 9.9 % (03-15-22 @ 07:30)    Glucose: POCT Blood Glucose.: 335 mg/dL (03.17.22 @ 05:45)  POCT Blood Glucose.: 254 mg/dL (03.16.22 @ 22:51)  POCT Blood Glucose.: 140 mg/dL (03.16.22 @ 18:41)  POCT Blood Glucose.: 127 mg/dL (03.16.22 @ 12:00)    BP: 154/85 (03-15-22 @ 23:07) (131/67 - 184/96)  Lipid Panel: triglycerides: 40 mg/dL   (03-16-22 @ 05:42)    *pertinent meds: methadone, insulin lispro, MVI, pancrelipase, thiamine, folic acid, albuterol, zofran    *I&O's Detail    16 Mar 2022 07:01  -  17 Mar 2022 07:00  --------------------------------------------------------  IN:    Other (mL): 1000 mL  Total IN: 1000 mL    OUT:    Nasogastric/Oral tube (mL): 175 mL    Voided (mL): 2000 mL  Total OUT: 2175 mL    Total NET: -1175 mL    * fluid status negative, PPN not documented. Will continue to monitor.     *Pt w/o BM.  pt not on bowel regimen.    *Douglas Score of 16; Stage III sacrum PU documented. No edema documented.    * Malnutrition: Pt meets criteria for severe malnutrition in acute on chronic illness r/t decreased PO intake 2/2 altered GI function (GOO) on pancreatitis AEB 55% wt loss x 1 yr; meeting <50% of ENN chronically; severe muscle/fat wasting.    Estimated Needs: based on 39Kg (wt from bed scale wt obtained by RD on 3/15)  Calories: 1052-4971 Kcal (35-40 Kcal/Kg)  Protein: 70.2-78 g (1.8-2.0 g/Kg)  Fluids: 3770-6093 mL (35-40 mL/Kg)    Weight History:  Height (cm): 167.6 (03-14-22 @ 05:19)  Weight (kg): 61.2 (03-14-22 @ 05:19)  BMI (kg/m2): 21.8 (03-14-22 @ 05:19)  BSA (m2): 1.69 (03-14-22 @ 05:19)  IBW: 172#    PPN Recommendations: via peripheral venous line  Total Volume: 1800  65 g  Amino Acids  100 g Dextrose  50 g Lipids 20%  114 mEq Sodium Chloride  17 mEq Sodium Acetate  25 mmol Sodium Phosphate  44 mEq Potassium Chloride  26 mEq Potassium Acetate  0 mmol Potassium Phosphate  0 mEq Calcium Gluconate  10 mEq Magnesium Sulfate  100 mg Thiamine  7 units regular insulin  1 ml Trace   10 ml MVI    Total Calories: 600 Kcal     (Meets 44% of Estimated Energy needs and 93%  Protein needs) (osmolarity <900)    Additional Recommendations:    1) Daily weights to track/trend changes  2) Strict I & O's  3) Daily lyte checks  4) Weekly triglycerides/LFT checks  5) Monitor BM; provide bowel regimen prn.    *will continue to monitor and adjust PN prn*  Rachel Olmos, Dietetic Intern

## 2022-03-17 NOTE — PROGRESS NOTE ADULT - ASSESSMENT
57 y/o male with PMHX of IVDA with heroin , opioid dependence on methadone, IDDM, chronic pancreatitis, abnormal weight loss over the last 1 year who was a transfer from [facility] ([place]) on 3/14/22  for possible ERCP.  Patient initially presented to outside hospital for worsening abdominal pain.  Patient had CT/ MRI/ US of the abdomen which showed chronic pancreatitis, gastric distention (for which he had an NGT),  intra/ extra hepatic biliary ductal dilatation, and fluid collection/  mass in the head of the pancreas compressing the SMV and proximal portal vein.  Upon admission, patient with significantly elevated ALK PHOS 1200.      Epigastric abdominal pain due to   Pancreatic mass,  associated with biliary and pancreatic duct dilatation , pancreatic stones with underlying chronic pancreatitis s/p ERCP, EUS s/p FNA  Partial gastric outlet obstruction due to severe duodenal inflamation s/p NGT now s/p GJ tube   Cholelithiasis and marked gallbladder distention without acute cholecystitis  Elevated CA 19-9   - 3/16/22 - s/p EUS and FNA of the pancreatic mass , ERCP, pancreatic sphincterotomy , s/p pancreatic stent , s/p GJ tube   - was NPO s/p GJ tube- to start Tfs today with recreational PO feeds   - PPN per surgical team   - complete workup with tumor markers/CT chest/Heme-Onc Cx  - CP - trops negative, likely radiating pain from abdomen     Hematemesis 3/15/22 Acute blood loss anemia due to upper GI bleeding   Iron deficiency   - s/p lovenox 40 3/14  - d/sherice   - Cont Protonix drip- change to IVP   - s/p  2 Units PRBC    Mild leukocytosis likely due to malignancy  LLL opacity suspected Pneumonia possible aspiration  - check UA, urine and blood cultures  - CXR - infiltrates  - CT abd - LLL PNA   - start Zosyn     DM type2 with A1C 9.9 with hypoglycemic episode on 3/15   - Glu earlier today in the 30's- responded to D50  - s/p lantus 6 unit hs - on  hold  - s/p  D5NS IVF will stop, continue with PPN   - correctional insulin only   - Adjust further based on BGM's     Abnormal Weight Loss   Severe Protein Calorie Malnutrition/ Cachexia      Severe hypoalbuminemia  Hyponatremia 2/2 pain  - On PPN  - HIV/hepatitis panel negative    IVDA/ Opioid Dependence  - Patient was on methadone 60 mg daily as per notes.    - Now NPO receiving IV morphine.    - palliative team consult - for pain management     Hypocalcemia - check vitamin D     DVT Proph:  venandrea    Dispo- PPN - transition to TFs, IV abx, labs in am, continue to monitor. f/u CT chest. tumor markers, Onc Cx

## 2022-03-17 NOTE — CHART NOTE - NSCHARTNOTEFT_GEN_A_CORE
Primary RN contacted GI team to inform patient was noted to have bleeding on new GJ tube dressing. On assessment there was a moderate amount of bright red blood on dressing, and was noted to have minimal drainage at tube site. This bleeding can be explained by the nature of the procedure, and patient's current malnutrition. Tube bumper was somewhat loose, and was tightened by NP. Site redressed over tube site. VS and Hgb stable on AM labs.     Discussed with Dr. Lemon

## 2022-03-17 NOTE — PROGRESS NOTE ADULT - ASSESSMENT
55y/o M pmhx IVDA, EtOH abuse, DM, HTN presents with abdominal pain and coffee ground emesis with CT and MRCP findings of double duct sign and GOO 2/2 pancreatic head mass, s/p GJ tube placement     Plan:   - pain control prn  - monitor VS   - NPO   - Tube feeds as per GI  - PPI as per GI  - c/w PPN   - Consider placing PICC line for TPN   - f/u GI recommendations  - f/u pathology  - Surgical oncology will follow   - Rest of care as per primary team    Case discussed with Surgical Oncology attending

## 2022-03-17 NOTE — CHART NOTE - NSCHARTNOTEFT_GEN_A_CORE
Dietitian TF Recs Note    Pt had GJ tube placed - as per Dr. Lemon will use J tube for feeds and will initiate today. Pt can have PO diet as well for pleasure feeds as tolerated but TF will meet 100% ENN.    Pt meets criteria for severe malnutrition in acute on chronic illness r/t decreased PO intake 2/2 altered GI function (GOO) on pancreatitis AEB 55% wt loss x 1 yr; meeting <50% of ENN chronically; severe muscle/fat wasting.    Estimated Needs: based on 39Kg (wt from bed scale wt obtained by RD on 3/15)  Calories: 7877-8080 Kcal (35-40 Kcal/Kg)  Protein: 70.2-78 g (1.8-2.0 g/Kg)  Fluids: 3133-7982 mL (35-40 mL/Kg)    RECOMMENDATIONS:  1) Initiate Glucerna 1.5 @ 10 cc/hr, increase by 10 cc q6 hrs until goal of 50 cc/hr (Provides 1500 kcal, 83g protein, and 760 mL).  2) Provide 35 cc/hr free water flush; adjust prn to maintain hydration. Provides total fluid of 1460 mL.  3) Advance diet to regular to maximize PO intake as tolerated for pleasure feeds. TF will provide full nutrition. When TF is meeting >/= 50% ENN, can d/c PPN.  4) Maintain aspiration precautions, back of bed >35 degrees.   5) Daily wts to track/ trend changes.    RD will continue to monitor PO intake/ TF tolerance, labs, hydration, and wt prn.   Alycia White, MS, RDN, [phone]

## 2022-03-17 NOTE — PROGRESS NOTE ADULT - SUBJECTIVE AND OBJECTIVE BOX
Patient seen and examined at bedside this am.  Complains of epigastric abdominal pain.  States he is hungry and would like to eat. s/p endoscopy by GI, GJ placed, EUS with FNBx, ERCP.  Denies fever, chills, chest pain, sob, n/v/d.     Vital Signs (24 Hrs):  T(C): 36.3 (03-17-22 @ 08:25), Max: 36.9 (03-16-22 @ 17:45)  HR: 69 (03-17-22 @ 08:25) (61 - 82)  BP: 155/77 (03-17-22 @ 08:25) (143/73 - 191/94)  RR: 18 (03-17-22 @ 08:25) (12 - 18)  SpO2: 100% (03-17-22 @ 08:25) (94% - 100%)  Wt(kg): --  Daily     Daily     I&O's Summary    16 Mar 2022 07:01  -  17 Mar 2022 07:00  --------------------------------------------------------  IN: 1000 mL / OUT: 2175 mL / NET: -1175 mL        Physical Exam:   General: AAOx3, malnourished, cachectic  Cardio: RRR, S1, S2   Pulmonary: equal chest rise B/L   Abdomen: soft, TTP epigastric, mildly distended, NGT in place, 500cc coffee ground emesis    .  LABS:                         10.3   11.18 )-----------( 461      ( 17 Mar 2022 05:04 )             31.2     03-17    130<L>  |  97  |  17  ----------------------------<  328<H>  4.6   |  27  |  0.45<L>    Ca    8.1<L>      17 Mar 2022 05:04  Phos  3.0     03-17  Mg     1.8     03-17    TPro  6.9  /  Alb  1.6<L>  /  TBili  1.1  /  DBili  x   /  AST  28  /  ALT  42  /  AlkPhos  980<H>  03-17          Lactate, Blood: 0.9 mmol/L (03-17 @ 05:05)      RADIOLOGY, EKG & ADDITIONAL TESTS: Reviewed.  < from: MR MRCP w/wo IV Cont (03.15.22 @ 15:44) >  IMPRESSION:  Pancreatic head mass suspicious for neoplasm with associated biliary and   pancreatic ductal dilatation. Recommend biopsy.  Upper abdominal lymphadenopathy  Distended stomach. Recommend clinical correlation for partial gastric   outlet obstruction    < end of copied text >  · Operative Findings	Severely inflammed duodenum from chronic pancreatitis. No gastric outlet obstruction but symptosm likely due to severe inflammation. G-J tube placed.   EUS with severe chronic pancreatitis. Large amount of calcifications in the head making evaluation for mass almost impossible. S/p FNB and sent for pathology.   ERCP with inability to cross wire into distal pancreas due to stones. Pancreatic sphincerotomy performed for decompression. 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

## 2022-03-18 LAB
ALBUMIN SERPL ELPH-MCNC: 1.4 G/DL — LOW (ref 3.3–5)
ALP SERPL-CCNC: 1238 U/L — HIGH (ref 40–120)
ALT FLD-CCNC: 51 U/L — SIGNIFICANT CHANGE UP (ref 12–78)
ANION GAP SERPL CALC-SCNC: 4 MMOL/L — LOW (ref 5–17)
AST SERPL-CCNC: 80 U/L — HIGH (ref 15–37)
BILIRUB SERPL-MCNC: 0.8 MG/DL — SIGNIFICANT CHANGE UP (ref 0.2–1.2)
BUN SERPL-MCNC: 15 MG/DL — SIGNIFICANT CHANGE UP (ref 7–23)
CALCIUM SERPL-MCNC: 7.9 MG/DL — LOW (ref 8.5–10.1)
CHLORIDE SERPL-SCNC: 99 MMOL/L — SIGNIFICANT CHANGE UP (ref 96–108)
CO2 SERPL-SCNC: 26 MMOL/L — SIGNIFICANT CHANGE UP (ref 22–31)
CREAT SERPL-MCNC: 0.37 MG/DL — LOW (ref 0.5–1.3)
EGFR: 132 ML/MIN/1.73M2 — SIGNIFICANT CHANGE UP
GLUCOSE SERPL-MCNC: 183 MG/DL — HIGH (ref 70–99)
MAGNESIUM SERPL-MCNC: 1.9 MG/DL — SIGNIFICANT CHANGE UP (ref 1.6–2.6)
PHOSPHATE SERPL-MCNC: 2.2 MG/DL — LOW (ref 2.5–4.5)
POTASSIUM SERPL-MCNC: 4.5 MMOL/L — SIGNIFICANT CHANGE UP (ref 3.5–5.3)
POTASSIUM SERPL-SCNC: 4.5 MMOL/L — SIGNIFICANT CHANGE UP (ref 3.5–5.3)
PROT SERPL-MCNC: 6.4 GM/DL — SIGNIFICANT CHANGE UP (ref 6–8.3)
SODIUM SERPL-SCNC: 129 MMOL/L — LOW (ref 135–145)
SURGICAL PATHOLOGY STUDY: SIGNIFICANT CHANGE UP

## 2022-03-18 PROCEDURE — 99233 SBSQ HOSP IP/OBS HIGH 50: CPT

## 2022-03-18 PROCEDURE — 99232 SBSQ HOSP IP/OBS MODERATE 35: CPT

## 2022-03-18 RX ORDER — MORPHINE SULFATE 50 MG/1
20 CAPSULE, EXTENDED RELEASE ORAL EVERY 4 HOURS
Refills: 0 | Status: DISCONTINUED | OUTPATIENT
Start: 2022-03-18 | End: 2022-03-24

## 2022-03-18 RX ORDER — DEXTROSE 50 % IN WATER 50 %
25 SYRINGE (ML) INTRAVENOUS ONCE
Refills: 0 | Status: COMPLETED | OUTPATIENT
Start: 2022-03-18 | End: 2022-03-18

## 2022-03-18 RX ORDER — MORPHINE SULFATE 50 MG/1
15 CAPSULE, EXTENDED RELEASE ORAL EVERY 4 HOURS
Refills: 0 | Status: DISCONTINUED | OUTPATIENT
Start: 2022-03-18 | End: 2022-03-25

## 2022-03-18 RX ORDER — PIPERACILLIN AND TAZOBACTAM 4; .5 G/20ML; G/20ML
3.38 INJECTION, POWDER, LYOPHILIZED, FOR SOLUTION INTRAVENOUS ONCE
Refills: 0 | Status: COMPLETED | OUTPATIENT
Start: 2022-03-18 | End: 2022-03-18

## 2022-03-18 RX ORDER — SODIUM,POTASSIUM PHOSPHATES 278-250MG
1 POWDER IN PACKET (EA) ORAL THREE TIMES A DAY
Refills: 0 | Status: COMPLETED | OUTPATIENT
Start: 2022-03-18 | End: 2022-03-19

## 2022-03-18 RX ORDER — PIPERACILLIN AND TAZOBACTAM 4; .5 G/20ML; G/20ML
3.38 INJECTION, POWDER, LYOPHILIZED, FOR SOLUTION INTRAVENOUS EVERY 8 HOURS
Refills: 0 | Status: DISCONTINUED | OUTPATIENT
Start: 2022-03-18 | End: 2022-03-18

## 2022-03-18 RX ORDER — ENOXAPARIN SODIUM 100 MG/ML
40 INJECTION SUBCUTANEOUS EVERY 24 HOURS
Refills: 0 | Status: DISCONTINUED | OUTPATIENT
Start: 2022-03-18 | End: 2022-03-21

## 2022-03-18 RX ORDER — INSULIN GLARGINE 100 [IU]/ML
15 INJECTION, SOLUTION SUBCUTANEOUS EVERY MORNING
Refills: 0 | Status: DISCONTINUED | OUTPATIENT
Start: 2022-03-18 | End: 2022-03-19

## 2022-03-18 RX ADMIN — METHADONE HYDROCHLORIDE 60 MILLIGRAM(S): 40 TABLET ORAL at 10:25

## 2022-03-18 RX ADMIN — PIPERACILLIN AND TAZOBACTAM 25 GRAM(S): 4; .5 INJECTION, POWDER, LYOPHILIZED, FOR SOLUTION INTRAVENOUS at 05:14

## 2022-03-18 RX ADMIN — MORPHINE SULFATE 8 MILLIGRAM(S): 50 CAPSULE, EXTENDED RELEASE ORAL at 07:25

## 2022-03-18 RX ADMIN — Medication 1 CAPSULE(S): at 09:34

## 2022-03-18 RX ADMIN — Medication 2: at 17:26

## 2022-03-18 RX ADMIN — Medication 100 MILLIGRAM(S): at 10:26

## 2022-03-18 RX ADMIN — MORPHINE SULFATE 8 MILLIGRAM(S): 50 CAPSULE, EXTENDED RELEASE ORAL at 07:41

## 2022-03-18 RX ADMIN — MORPHINE SULFATE 8 MILLIGRAM(S): 50 CAPSULE, EXTENDED RELEASE ORAL at 00:25

## 2022-03-18 RX ADMIN — MORPHINE SULFATE 6 MILLIGRAM(S): 50 CAPSULE, EXTENDED RELEASE ORAL at 02:49

## 2022-03-18 RX ADMIN — Medication 4: at 05:14

## 2022-03-18 RX ADMIN — PANTOPRAZOLE SODIUM 40 MILLIGRAM(S): 20 TABLET, DELAYED RELEASE ORAL at 21:19

## 2022-03-18 RX ADMIN — PIPERACILLIN AND TAZOBACTAM 25 GRAM(S): 4; .5 INJECTION, POWDER, LYOPHILIZED, FOR SOLUTION INTRAVENOUS at 13:18

## 2022-03-18 RX ADMIN — Medication 100 MILLIGRAM(S): at 21:19

## 2022-03-18 RX ADMIN — Medication 1 CAPSULE(S): at 13:18

## 2022-03-18 RX ADMIN — Medication 1 MILLIGRAM(S): at 10:26

## 2022-03-18 RX ADMIN — Medication 1 PACKET(S): at 21:19

## 2022-03-18 RX ADMIN — MORPHINE SULFATE 20 MILLIGRAM(S): 50 CAPSULE, EXTENDED RELEASE ORAL at 21:19

## 2022-03-18 RX ADMIN — Medication 1 PACKET(S): at 13:18

## 2022-03-18 RX ADMIN — Medication 1 TABLET(S): at 10:26

## 2022-03-18 RX ADMIN — INSULIN GLARGINE 15 UNIT(S): 100 INJECTION, SOLUTION SUBCUTANEOUS at 10:25

## 2022-03-18 RX ADMIN — Medication 1 CAPSULE(S): at 17:26

## 2022-03-18 RX ADMIN — MORPHINE SULFATE 20 MILLIGRAM(S): 50 CAPSULE, EXTENDED RELEASE ORAL at 17:27

## 2022-03-18 RX ADMIN — MORPHINE SULFATE 8 MILLIGRAM(S): 50 CAPSULE, EXTENDED RELEASE ORAL at 00:40

## 2022-03-18 RX ADMIN — MORPHINE SULFATE 6 MILLIGRAM(S): 50 CAPSULE, EXTENDED RELEASE ORAL at 03:04

## 2022-03-18 RX ADMIN — Medication 25 GRAM(S): at 23:47

## 2022-03-18 RX ADMIN — PANTOPRAZOLE SODIUM 40 MILLIGRAM(S): 20 TABLET, DELAYED RELEASE ORAL at 10:26

## 2022-03-18 NOTE — PROGRESS NOTE ADULT - SUBJECTIVE AND OBJECTIVE BOX
HPI:    HPI:    Mr. Pugh is a 56 year old male phx of IVDA with heroin last used 2 months  ago as per patient, opioid dependence on methadone (states for 27 years??), IDDM, chronic pancreatitis, and recent significant weight loss over admitted for panreatitis, found to have pancreatic mass.     3/16/22  Pt seen and examined bed side, NGT inplace to suciton. Pt in signficant pain this morning last dose of Morphine 4mg was at 7am and I saw him closer to 11.  He states the 4mg had little to no effect.  We discussed options for pain managment now and then possibly moving forward if symptoms do not alleviate.     He is frustrated that he's so sick, he wishes he could pull the NGT and go home.  He has some underlying diagnosis of anxiety/depression he states -- he doesn't know what psych meds he's on and doesn't have his cellphone to give us contact info on outpatient psych.      As per SW note:  Hx of Patient's residence is in the [place], was discharged from  [facility] on 2/22/2022 to [facility] in [place] for ASHLEY. Brother is  Eusebio .     Patient told me that Eusebio would be his HCP if he's unable to make any medical decisions.       3/17/22  pt seen and examined  pain is better controlled "not perfect" but better.  He c/o hunger but remains NPO at this time  on exam he does appear more comfortable and is even more conversive now w/ his pain control.         PAIN: (x )Yes   ( )No  Level: moderate  Location: abdomen , epigastric  Intensity:    5/10  Quality: gnawing, shooting  Aggravating Factors: po intake   Alleviating Factors: medication  Radiation: to the back  Duration/Timing: constant  Impact on ADLs: significant    DYSPNEA: ( ) Yes  ( x) No  Level:        Review of Systems:    Anxiety- c/o signficant anxiety pt w/ insomnia but is NPO was unable to receive Ambien   Depression- sad and states hx of depression unknown meds at home he states  Physical Discomfort- improved some, more comfortable  Dyspnea- none   Constipation- none  Nausea- ++   Vomiting- not at this time  Anorexia- pt hungry today  Weight Loss- significant   Cough- none  Secretions- none  Fatigue- +  insomnia  Weakness- ++  Delirium- none    All other systems reviewed and negative      PHYSICAL EXAM:    Vital Signs Last 24 Hrs  T(C): 37.4 (18 Mar 2022 09:40), Max: 37.4 (18 Mar 2022 09:40)  T(F): 99.3 (18 Mar 2022 09:40), Max: 99.3 (18 Mar 2022 09:40)  HR: 84 (18 Mar 2022 09:40) (67 - 84)  BP: 142/77 (18 Mar 2022 09:40) (133/63 - 165/81)  BP(mean): --  RR: 18 (18 Mar 2022 09:40) (17 - 18)  SpO2: 100% (18 Mar 2022 09:40) (100% - 100%)  Daily     Daily     PPSV2:30   %  FAST:    General: cachectic, severe frailty  Mental Status: a+Ox3  HEENT: EOMI PERRL  Lungs: ctabl bl  Cardiac: s1s2  GI: abdomen w/ some tenderness  : voids  Ext: moves all 4 extremities spontaneously overall generalized weakness  Neuro: follows commands      LABS:                        10.3   11.18 )-----------( 461      ( 17 Mar 2022 05:04 )             31.2     03-18    129<L>  |  99  |  15  ----------------------------<  183<H>  4.5   |  26  |  0.37<L>    Ca    7.9<L>      18 Mar 2022 05:04  Phos  2.2     03-18  Mg     1.9     03-18    TPro  6.4  /  Alb  1.4<L>  /  TBili  0.8  /  DBili  x   /  AST  80<H>  /  ALT  51  /  AlkPhos  1238<H>  03-18      Albumin: Albumin, Serum: 1.4 g/dL (03-18 @ 05:04)      Allergies    No Known Allergies    Intolerances      MEDICATIONS  (STANDING):  bisacodyl Suppository 10 milliGRAM(s) Rectal <User Schedule>  dextrose 40% Gel 15 Gram(s) Oral once  dextrose 5%. 1000 milliLiter(s) (50 mL/Hr) IV Continuous <Continuous>  dextrose 5%. 1000 milliLiter(s) (100 mL/Hr) IV Continuous <Continuous>  dextrose 50% Injectable 25 Gram(s) IV Push once  dextrose 50% Injectable 12.5 Gram(s) IV Push once  dextrose 50% Injectable 25 Gram(s) IV Push once  folic acid 1 milliGRAM(s) Oral daily  glucagon  Injectable 1 milliGRAM(s) IntraMuscular once  insulin glargine Injectable (LANTUS) 15 Unit(s) SubCutaneous every morning  insulin lispro (ADMELOG) corrective regimen sliding scale   SubCutaneous every 6 hours  methadone    Tablet 60 milliGRAM(s) Oral daily  multivitamin 1 Tablet(s) Oral daily  pancrelipase (ZENPEP 20,000 Lipase Units) 1 Capsule(s) Oral three times a day with meals  pantoprazole  Injectable 40 milliGRAM(s) IV Push every 12 hours  piperacillin/tazobactam IVPB.. 3.375 Gram(s) IV Intermittent every 8 hours  potassium phosphate / sodium phosphate Powder (PHOS-NaK) 1 Packet(s) Oral three times a day  thiamine 100 milliGRAM(s) Oral daily    MEDICATIONS  (PRN):  acetaminophen     Tablet .. 650 milliGRAM(s) Oral every 6 hours PRN Temp greater or equal to 38C (100.4F), Mild Pain (1 - 3)  ALBUTerol    90 MICROgram(s) HFA Inhaler 2 Puff(s) Inhalation every 6 hours PRN Bronchospasm  aluminum hydroxide/magnesium hydroxide/simethicone Suspension 30 milliLiter(s) Oral every 4 hours PRN Dyspepsia  melatonin 3 milliGRAM(s) Oral at bedtime PRN Insomnia  morphine  - Injectable 8 milliGRAM(s) IV Push every 3 hours PRN Severe Pain (7 - 10)  morphine  - Injectable 6 milliGRAM(s) IV Push every 3 hours PRN Moderate Pain (4 - 6)  ondansetron Injectable 4 milliGRAM(s) IV Push every 8 hours PRN Nausea and/or Vomiting  zolpidem 5 milliGRAM(s) Oral at bedtime PRN Insomnia      RADIOLOGY:   HPI     Mr. Pugh is a 56 year old male phx of IVDA with heroin last used 2 months  ago as per patient, opioid dependence on methadone (states for 27 years??), IDDM, chronic pancreatitis, and recent significant weight loss over admitted for panreatitis, found to have pancreatic mass.     3/16/22  Pt seen and examined bed side, NGT inplace to suciton. Pt in signficant pain this morning last dose of Morphine 4mg was at 7am and I saw him closer to 11.  He states the 4mg had little to no effect.  We discussed options for pain managment now and then possibly moving forward if symptoms do not alleviate.     He is frustrated that he's so sick, he wishes he could pull the NGT and go home.  He has some underlying diagnosis of anxiety/depression he states -- he doesn't know what psych meds he's on and doesn't have his cellphone to give us contact info on outpatient psych.      As per SW note:  Hx of Patient's residence is in the [place], was discharged from  [facility] on 2/22/2022 to [facility] in [place] for ASHLEY. Brother is  Eusebio .     Patient told me that Eusebio would be his HCP if he's unable to make any medical decisions.       3/17/22  pt seen and examined  pain is better controlled "not perfect" but better.  He c/o hunger but remains NPO at this time  on exam he does appear more comfortable and is even more conversive now w/ his pain control.       3/18/22  pt seen and examined  c/o sputum  but no sob or cp at this time  no nausea or vomiting  Used 3 doses of breakthrough morphine yesterday       PAIN: (x )Yes   ( )No  Level: moderate  Location: abdomen , epigastric  Intensity:    5/10  Quality: gnawing, shooting  Aggravating Factors: po intake   Alleviating Factors: medication  Radiation: to the back  Duration/Timing: constant  Impact on ADLs: significant    DYSPNEA: ( ) Yes  ( x) No  Level:        Review of Systems:    Anxiety- + hx   Depression- + hx  Physical Discomfort- still has discomfort better than admission  Dyspnea- none   Constipation- none  Nausea- ++   Vomiting- not at this time  Anorexia- pt hungry today  Weight Loss- significant   Cough- none  Secretions- none  Fatigue- +  insomnia  Weakness- ++  Delirium- none    All other systems reviewed and negative      PHYSICAL EXAM:    Vital Signs Last 24 Hrs  T(C): 37.4 (18 Mar 2022 09:40), Max: 37.4 (18 Mar 2022 09:40)  T(F): 99.3 (18 Mar 2022 09:40), Max: 99.3 (18 Mar 2022 09:40)  HR: 84 (18 Mar 2022 09:40) (67 - 84)  BP: 142/77 (18 Mar 2022 09:40) (133/63 - 165/81)  BP(mean): --  RR: 18 (18 Mar 2022 09:40) (17 - 18)  SpO2: 100% (18 Mar 2022 09:40) (100% - 100%)  Daily     Daily     PPSV2:30   %  FAST:    General: cachectic, severe frailty  Mental Status: a+Ox3  HEENT: EOMI PERRL  Lungs: ctabl bl no wheezing or rales  Cardiac: s1s2  GI: abdomen w/ some tenderness J-G tube  : voids  Ext: moves all 4 extremities spontaneously overall generalized weakness  Neuro: follows commands      LABS:                        10.3   11.18 )-----------( 461      ( 17 Mar 2022 05:04 )             31.2     03-18    129<L>  |  99  |  15  ----------------------------<  183<H>  4.5   |  26  |  0.37<L>    Ca    7.9<L>      18 Mar 2022 05:04  Phos  2.2     03-18  Mg     1.9     03-18    TPro  6.4  /  Alb  1.4<L>  /  TBili  0.8  /  DBili  x   /  AST  80<H>  /  ALT  51  /  AlkPhos  1238<H>  03-18      Albumin: Albumin, Serum: 1.4 g/dL (03-18 @ 05:04)      Allergies    No Known Allergies    Intolerances      MEDICATIONS  (STANDING):  bisacodyl Suppository 10 milliGRAM(s) Rectal <User Schedule>  dextrose 40% Gel 15 Gram(s) Oral once  dextrose 5%. 1000 milliLiter(s) (50 mL/Hr) IV Continuous <Continuous>  dextrose 5%. 1000 milliLiter(s) (100 mL/Hr) IV Continuous <Continuous>  dextrose 50% Injectable 25 Gram(s) IV Push once  dextrose 50% Injectable 12.5 Gram(s) IV Push once  dextrose 50% Injectable 25 Gram(s) IV Push once  folic acid 1 milliGRAM(s) Oral daily  glucagon  Injectable 1 milliGRAM(s) IntraMuscular once  insulin glargine Injectable (LANTUS) 15 Unit(s) SubCutaneous every morning  insulin lispro (ADMELOG) corrective regimen sliding scale   SubCutaneous every 6 hours  methadone    Tablet 60 milliGRAM(s) Oral daily  multivitamin 1 Tablet(s) Oral daily  pancrelipase (ZENPEP 20,000 Lipase Units) 1 Capsule(s) Oral three times a day with meals  pantoprazole  Injectable 40 milliGRAM(s) IV Push every 12 hours  piperacillin/tazobactam IVPB.. 3.375 Gram(s) IV Intermittent every 8 hours  potassium phosphate / sodium phosphate Powder (PHOS-NaK) 1 Packet(s) Oral three times a day  thiamine 100 milliGRAM(s) Oral daily    MEDICATIONS  (PRN):  acetaminophen     Tablet .. 650 milliGRAM(s) Oral every 6 hours PRN Temp greater or equal to 38C (100.4F), Mild Pain (1 - 3)  ALBUTerol    90 MICROgram(s) HFA Inhaler 2 Puff(s) Inhalation every 6 hours PRN Bronchospasm  aluminum hydroxide/magnesium hydroxide/simethicone Suspension 30 milliLiter(s) Oral every 4 hours PRN Dyspepsia  melatonin 3 milliGRAM(s) Oral at bedtime PRN Insomnia  morphine  - Injectable 8 milliGRAM(s) IV Push every 3 hours PRN Severe Pain (7 - 10)  morphine  - Injectable 6 milliGRAM(s) IV Push every 3 hours PRN Moderate Pain (4 - 6)  ondansetron Injectable 4 milliGRAM(s) IV Push every 8 hours PRN Nausea and/or Vomiting  zolpidem 5 milliGRAM(s) Oral at bedtime PRN Insomnia      RADIOLOGY:

## 2022-03-18 NOTE — CONSULT NOTE ADULT - SUBJECTIVE AND OBJECTIVE BOX
Patient is a 56y old  Male who presents with a chief complaint of abdominal pain-- transfer for possible ERCP (18 Mar 2022 11:21)    HPI:  55 y/o male with PMHX of IVDA with heroin-- last used 2 months ago as per patient, opioid dependence on methadone, IDDM, chronic pancreatitis, and recent significant weight loss over the last 1 year admitted on 3/14 from [facility] in [place] for ERCP, further gi workup. The patient has left upper extremity picc line, had the ERCP done as well as peg tube placed. There is concern for pancreatic cancer, however the calcifications in the pancrease made access difficult for tissue to be obtained. Patient has no appetite, is cachectic and history per medical chart as patient is poor historian.         PMH: as above  PSH: as above  Meds: per reconciliation sheet, noted below  MEDICATIONS  (STANDING):  bisacodyl Suppository 10 milliGRAM(s) Rectal <User Schedule>  dextrose 40% Gel 15 Gram(s) Oral once  dextrose 5%. 1000 milliLiter(s) (50 mL/Hr) IV Continuous <Continuous>  dextrose 5%. 1000 milliLiter(s) (100 mL/Hr) IV Continuous <Continuous>  dextrose 50% Injectable 25 Gram(s) IV Push once  dextrose 50% Injectable 12.5 Gram(s) IV Push once  dextrose 50% Injectable 25 Gram(s) IV Push once  folic acid 1 milliGRAM(s) Oral daily  glucagon  Injectable 1 milliGRAM(s) IntraMuscular once  insulin glargine Injectable (LANTUS) 15 Unit(s) SubCutaneous every morning  insulin lispro (ADMELOG) corrective regimen sliding scale   SubCutaneous every 6 hours  methadone    Tablet 60 milliGRAM(s) Oral daily  multivitamin 1 Tablet(s) Oral daily  pancrelipase (ZENPEP 20,000 Lipase Units) 1 Capsule(s) Oral three times a day with meals  pantoprazole  Injectable 40 milliGRAM(s) IV Push every 12 hours  piperacillin/tazobactam IVPB.. 3.375 Gram(s) IV Intermittent every 8 hours  potassium phosphate / sodium phosphate Powder (PHOS-NaK) 1 Packet(s) Oral three times a day  thiamine 100 milliGRAM(s) Oral daily    MEDICATIONS  (PRN):  acetaminophen     Tablet .. 650 milliGRAM(s) Oral every 6 hours PRN Temp greater or equal to 38C (100.4F), Mild Pain (1 - 3)  ALBUTerol    90 MICROgram(s) HFA Inhaler 2 Puff(s) Inhalation every 6 hours PRN Bronchospasm  aluminum hydroxide/magnesium hydroxide/simethicone Suspension 30 milliLiter(s) Oral every 4 hours PRN Dyspepsia  melatonin 3 milliGRAM(s) Oral at bedtime PRN Insomnia  morphine   Solution 15 milliGRAM(s) Oral every 4 hours PRN Moderate Pain (4 - 6)  morphine   Solution 20 milliGRAM(s) Oral every 4 hours PRN Severe Pain (7 - 10)  ondansetron Injectable 4 milliGRAM(s) IV Push every 8 hours PRN Nausea and/or Vomiting  zolpidem 5 milliGRAM(s) Oral at bedtime PRN Insomnia    Allergies    No Known Allergies    Intolerances      Social: no smoking, no alcohol, positive illegal drugs; no recent travel, no exposure to TB  FAMILY HISTORY:     ROS unable to obtain secondary to patient medical condition     Vital Signs Last 24 Hrs  T(C): 36.4 (18 Mar 2022 14:05), Max: 37.4 (18 Mar 2022 09:40)  T(F): 97.5 (18 Mar 2022 14:05), Max: 99.3 (18 Mar 2022 09:40)  HR: 91 (18 Mar 2022 14:05) (67 - 91)  BP: 132/77 (18 Mar 2022 14:05) (132/77 - 142/77)  BP(mean): --  RR: 18 (18 Mar 2022 14:05) (17 - 18)  SpO2: 99% (18 Mar 2022 14:05) (99% - 100%)  Daily     Daily     PE:    Constitutional: frail looking, cachectic appearing  HEENT: NC/AT, EOMI, PERRLA, temporal wasting  Neck: supple; thyroid not palpable  Back: no tenderness  Respiratory: respiratory effort normal; clear to auscultation  Cardiovascular: S1S2 regular, no murmurs  Abdomen: soft, not tender, not distended, positive BS; no liver or spleen organomegaly  Genitourinary: no suprapubic tenderness  Musculoskeletal: no muscle tenderness, no joint swelling or tenderness; right upper extremity picc line  Neurological/ Psychiatric: AxOx3, judgement and insight normal;  moving all extremities  Skin: no rashes; no palpable lesions    Labs: all available labs reviewed                        10.3   11.18 )-----------( 461      ( 17 Mar 2022 05:04 )             31.2     03-18    129<L>  |  99  |  15  ----------------------------<  183<H>  4.5   |  26  |  0.37<L>    Ca    7.9<L>      18 Mar 2022 05:04  Phos  2.2     03-18  Mg     1.9     03-18    TPro  6.4  /  Alb  1.4<L>  /  TBili  0.8  /  DBili  x   /  AST  80<H>  /  ALT  51  /  AlkPhos  1238<H>  03-18     LIVER FUNCTIONS - ( 18 Mar 2022 05:04 )  Alb: 1.4 g/dL / Pro: 6.4 gm/dL / ALK PHOS: 1238 U/L / ALT: 51 U/L / AST: 80 U/L / GGT: x           < from: CT Chest w/ IV Cont (03.17.22 @ 10:35) >    IMPRESSION:    No pulmonary embolism.    Few ill-defined clustered nodules in the right lower lobe, likely related   impacted bronchioles.    3 mm right upper lobe nodule. Recommend follow-up per protocol of primary   pancreatic cancer.    Trace pleural effusions.    Vertebral body erosions and sclerosis abutting the the T6-T7 disc space   and thickening of the paraspinal soft tissues, raising suspicion for   discitis/osteomyelitis.    < end of copied text >        Radiology: all available radiological tests reviewed    Advanced directives addressed: full resuscitation MODERATE

## 2022-03-18 NOTE — CONSULT NOTE ADULT - REASON FOR ADMISSION
abdominal pain-- transfer for possible ERCP

## 2022-03-18 NOTE — PROGRESS NOTE ADULT - ASSESSMENT
Assessment:   56 year old male phx of IVDA with heroin last used 2 months  ago as per patient, opioid dependence on methadone, IDDM, chronic pancreatitis,  and recent significant weight loss over admitted for panreatitis, found to have  pancreatic mass.      Process of Care  --Reviewed dx/treatment problems and alignment with Goals of Care    Physical Aspects of Care  --Pain  convert to PO   pt received total of 6mg IV x2 and 8mg IV x1   Convert 6mg IV [id number] PRN for moderate pain to Morphine IR PO 15mg [id number] PRN   convert 8mg IV [id number] PRN for severe pain to Morphine IR PO 20mg [id number] PRN   c/w Tylenol 1000mg TID IV  PO   consider adding Ibuprofen 800mg H1cxant PRN as well   pt w/ hx of IVDA which puts him at high risk BUT d/t medical issues his pain will necessitate opiate regimens at least in patient.  Risk and benefits discussed w/ patient and so was above plan      --Bowel Regimen  +constipation  risk for constipation d/t immobility and opiates   Dulcolax suppository [id number] hold for BMS    --Dyspnea  No SOB at this time  comfortable and in NAD    --Nausea Vomiting  denies    --Weakness  PT as tolerated     Psychological and Psychiatric Aspects of Care:   --Greif/Bereavment: emotional support provided  --Hx of psychiatric dx: none  -Pastoral Care Available PRN     Social Aspects of Care  -SW involved     Cultural Aspects  -Primary Language: English    Goals of Care:     We discussed Palliative Care team being a supportive team when a patient has ongoing illnesses.  We also discussed that it is not an end of life care service, but can help navigate symptoms and emotional support throughout their hospital stay here.    currently FULL CODE   GOC  at this time unchanged.   Will readdress   Ethical and Legal Aspects:   NA    Capacity- pt w/ capacity for complex decisions    HCP/Surrogate: Eusebio Pugh (brother)  Code Status- FULL   MOLST- NA  Dispo Plan-  Discussed With:  Case coordinated with attending and SW and RN     Time Spent: 50 minutes including the care, coordination and counseling of this patient, 50% of which was spent coordinating and counseling.    Assessment:   56 year old male phx of IVDA with heroin last used 2 months  ago as per patient, opioid dependence on methadone, IDDM, chronic pancreatitis,  and recent significant weight loss over admitted for panreatitis, found to have  pancreatic mass.      Process of Care  --Reviewed dx/treatment problems and alignment with Goals of Care    Physical Aspects of Care  --Pain  convert to PO   pt received total of 6mg IV x2 and 8mg IV x1   Convert 6mg IV [id number] PRN for moderate pain to Morphine IR PO 15mg [id number] PRN   convert 8mg IV [id number] PRN for severe pain to Morphine IR PO 20mg [id number] PRN   d/t PO formulation the effects will also last longer (which is why we increase the doseing interval by 1 hour)  c/w Tylenol 1000mg TID    PO   consider adding Ibuprofen 800mg [id number] PRN as well   pt w/ hx of IVDA which puts him at high risk BUT d/t medical issues his pain will necessitate opiate regimens at least in patient.  Risk and benefits discussed w/ patient and so was above plan      --Bowel Regimen  +constipation  risk for constipation d/t immobility and opiates   Dulcolax suppository [id number] hold for BMS    --Dyspnea  No SOB at this time  comfortable and in NAD    --Nausea Vomiting  denies    --Weakness  PT as tolerated     Psychological and Psychiatric Aspects of Care:   --Greif/Bereavment: emotional support provided  --Hx of psychiatric dx: none  -Pastoral Care Available PRN     Social Aspects of Care  -SW involved     Cultural Aspects  -Primary Language: English    Goals of Care:      currently FULL CODE   GOC  at this time unchanged.       Ethical and Legal Aspects:   NA    Capacity- pt w/ capacity for complex decisions    HCP/Surrogate: Eusebio Pugh (brother)  Code Status- FULL   MOLST- NA  Dispo Plan-  Discussed With:  Case coordinated with attending and SW and RN     Time Spent: 45 minutes including the care, coordination and counseling of this patient, 50% of which was spent coordinating and counseling.

## 2022-03-18 NOTE — CONSULT NOTE ADULT - ASSESSMENT
55 y/o male with PMHX of IVDA with heroin-- last used 2 months ago as per patient, opioid dependence on methadone, IDDM, chronic pancreatitis, and recent significant weight loss over the last 1 year admitted on 3/14 from [facility] in [place] for ERCP, further gi workup. The patient has left upper extremity picc line, had the ERCP done as well as peg tube placed. There is concern for pancreatic cancer, however the calcifications in the pancrease made access difficult for tissue to be obtained. Patient has no appetite, is cachectic and history per medical chart as patient is poor historian.     1. Patient admitted with pancreatitis, incidentally found to have vertebral disciitis on imaging, unclear if this truly represents an active infection  - follow up cultures   - serial cbc and monitor temperature   - reviewed prior medical records to evaluate for resistant or atypical pathogens   - on zosyn for unclear reasons, should avoid antibiotics with pancreatitis unless necrotizing pancreatitis  - will stop zosyn at this time, can start doxycycline for the disciitis, however the more pressing issues seem to revolve around a possible pancreatic malignancy and are being addressed  - favor removing the picc line, will need to discuss this further   Will follow

## 2022-03-18 NOTE — PROGRESS NOTE ADULT - ASSESSMENT
55 y/o male with hx of IVDA with heroin last used 2 months ago as per patient, opioid dependence on methadone, IDDM, chronic pancreatitis, and recent significant weight loss over the last 1 year who was a transfer from [facility] ([place]) for management. He continues to have diffuse upper abdominal pain likely due to chronic pancreatitis. He is s/p EUS/ERCP which showed severely inflamed duodenum from chronic pancreatitis. No gastric outlet obstruction but symptoms likely due to severe inflammation. G-J tube placed. EUS with severe chronic pancreatitis. Large amount of calcifications in the head making evaluation for mass almost impossible. S/p FNB and sent for pathology. ERCP with inability to cross wire into distal pancreas due to stones. Pancreatic sphincterotomy performed for decompression. 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    PLAN  C/w tube feedings per Nutrition  GJ tube: G tube /Suction port for meds and for venting, J tube/Feeds port for feeds only, Rx port never to be used.  Pain control PRN  Antiemetics PRN  Supportive care    Discussed with Dr. Lemon

## 2022-03-18 NOTE — CONSULT NOTE ADULT - SUBJECTIVE AND OBJECTIVE BOX
HPI:  57 y/o male with PMHX of IVDA with heroin-- last used 2 months ago as per patient, opioid dependence on methadone, IDDM, chronic pancreatitis, and recent significant weight loss over the last 1 year who was a transfer from [facility] ([place]) for possible ERCP.  Patient initially presented to outside hospital for worsening abdominal pain.  Patient had CT/ MRI/ US of the abdomen which showed chronic pancreatitis, gastric distention (for which he had an NGT),  intra/ extra hepatic biliary ductal dilatation, and fluid collection/ ? mass in the head of the pancreas compressing the SMV and proximal portal vein.  Patient was transferred to  for possible need for ERCP and further GI evaluation.  Upon admission, patient with significantly elevated ALKPHOS 1200.  S/P Endoscopy / GT tube placement/ ERCP and biopsies/ path pending       PAST MEDICAL & SURGICAL HISTORY:  IVDA  Opioid dependance  Pancreatitis    FAMILY HISTORY:  Unable to obtain from patient at this time.    Social History:    Previously living in SNF.    Transfer from [facility].    +Drug use-- IVDA / heroin/ fentanyl.    No tob use.      Allergies:  No Known Allergies   (14 Mar 2022 11:54)      PAST MEDICAL & SURGICAL HISTORY:      MEDICATIONS  (STANDING):  bisacodyl Suppository 10 milliGRAM(s) Rectal <User Schedule>  dextrose 40% Gel 15 Gram(s) Oral once  dextrose 5%. 1000 milliLiter(s) (50 mL/Hr) IV Continuous <Continuous>  dextrose 5%. 1000 milliLiter(s) (100 mL/Hr) IV Continuous <Continuous>  dextrose 50% Injectable 25 Gram(s) IV Push once  dextrose 50% Injectable 12.5 Gram(s) IV Push once  dextrose 50% Injectable 25 Gram(s) IV Push once  folic acid 1 milliGRAM(s) Oral daily  glucagon  Injectable 1 milliGRAM(s) IntraMuscular once  insulin glargine Injectable (LANTUS) 15 Unit(s) SubCutaneous every morning  insulin lispro (ADMELOG) corrective regimen sliding scale   SubCutaneous every 6 hours  methadone    Tablet 60 milliGRAM(s) Oral daily  multivitamin 1 Tablet(s) Oral daily  pancrelipase (ZENPEP 20,000 Lipase Units) 1 Capsule(s) Oral three times a day with meals  pantoprazole  Injectable 40 milliGRAM(s) IV Push every 12 hours  piperacillin/tazobactam IVPB.. 3.375 Gram(s) IV Intermittent every 8 hours  potassium phosphate / sodium phosphate Powder (PHOS-NaK) 1 Packet(s) Oral three times a day  thiamine 100 milliGRAM(s) Oral daily    MEDICATIONS  (PRN):  acetaminophen     Tablet .. 650 milliGRAM(s) Oral every 6 hours PRN Temp greater or equal to 38C (100.4F), Mild Pain (1 - 3)  ALBUTerol    90 MICROgram(s) HFA Inhaler 2 Puff(s) Inhalation every 6 hours PRN Bronchospasm  aluminum hydroxide/magnesium hydroxide/simethicone Suspension 30 milliLiter(s) Oral every 4 hours PRN Dyspepsia  melatonin 3 milliGRAM(s) Oral at bedtime PRN Insomnia  morphine  - Injectable 8 milliGRAM(s) IV Push every 3 hours PRN Severe Pain (7 - 10)  morphine  - Injectable 6 milliGRAM(s) IV Push every 3 hours PRN Moderate Pain (4 - 6)  ondansetron Injectable 4 milliGRAM(s) IV Push every 8 hours PRN Nausea and/or Vomiting  zolpidem 5 milliGRAM(s) Oral at bedtime PRN Insomnia      Allergies    No Known Allergies    Intolerances        SOCIAL HISTORY:    FAMILY HISTORY:      Vital Signs Last 24 Hrs  T(C): 37.4 (18 Mar 2022 09:40), Max: 37.4 (18 Mar 2022 09:40)  T(F): 99.3 (18 Mar 2022 09:40), Max: 99.3 (18 Mar 2022 09:40)  HR: 84 (18 Mar 2022 09:40) (67 - 84)  BP: 142/77 (18 Mar 2022 09:40) (133/63 - 165/81)  BP(mean): --  RR: 18 (18 Mar 2022 09:40) (17 - 18)  SpO2: 100% (18 Mar 2022 09:40) (100% - 100%)  Cachectic  Chronically ill appearing  Alert  NAD  Neck neg  No nodes  No rash  Abd GT  Ext neg     LABS:                        10.3   11.18 )-----------( 461      ( 17 Mar 2022 05:04 )             31.2     03-18    129<L>  |  99  |  15  ----------------------------<  183<H>  4.5   |  26  |  0.37<L>    Ca    7.9<L>      18 Mar 2022 05:04  Phos  2.2     03-18  Mg     1.9     03-18    TPro  6.4  /  Alb  1.4<L>  /  TBili  0.8  /  DBili  x   /  AST  80<H>  /  ALT  51  /  AlkPhos  1238<H>  03-18          RADIOLOGY & ADDITIONAL STUDIES:    < from: CT Chest w/ IV Cont (03.17.22 @ 10:35) >  INTERPRETATION:  INDICATION: Chest pain, new pancreatic cancer    TECHNIQUE: Volumetric images of the chest were obtained after the   administration of 90 mL of Omnipaque 350. Maximum intensity projection   images were generated.    COMPARISON: None.    FINDINGS:    PULMONARY ANGIOGRAM:  No pulmonary embolism.    LUNGS/AIRWAYS/PLEURA: Patent trachea and bronchi. Mild dependent   atelectasis in the lower lobes. Trace paraseptal emphysema. Few small   clustered ill-defined nodules in the superior and posterior right lower   lobe. 3 mm solid nodule in the right upper lobe (2-29). Trace pleural   effusions.    LYMPH NODES/MEDIASTINUM: No enlarged lymph nodes.    HEART/VASCULATURE: Normal heart size. Unremarkable pericardium. Normal   caliber aorta.    UPPER ABDOMEN: Gastrostomy tube and nasogastric tube in the stomach.   Intrahepatic and extrahepatic biliary ductal dilatation. Pancreatic mass,   calcifications, and ductal dilatation, best characterized on recent   abdominal imaging.    BONES/SOFT TISSUES: Remote left rib fractures. Erosions and sclerosis of   the adjacent endplates of the T6-T7 disc space, and thickening of the   adjacent paraspinal soft tissues. Mild loss of height of T6.      IMPRESSION:    No pulmonary embolism.    Few ill-defined clustered nodules in the right lower lobe, likely related   impacted bronchioles.    3 mm right upper lobe nodule. Recommend follow-up per protocol of primary   pancreatic cancer.    Trace pleural effusions.    Vertebral body erosions and sclerosis abutting the the T6-T7 disc space   and thickening of the paraspinal soft tissues, raising suspicion for   discitis/osteomyelitis.    < end of copied text >  < from: CT Abdomen and Pelvis w/ IV Cont (03.14.22 @ 07:53) >  ACC: [id number] EXAM:  CT ABDOMEN AND PELVIS IC                          PROCEDURE DATE:  03/14/2022          INTERPRETATION:  CLINICAL INFORMATION: Pancreatitis suspected    COMPARISON: None.    CONTRAST/COMPLICATIONS:  IV Contrast: Omnipaque 350  90 cc administered   10 cc discarded  Oral Contrast: NONE  Complications: None reported at time of study completion    PROCEDURE:  CT of the Abdomen and Pelvis was performed.  Sagittal and coronal reformats were performed.    FINDINGS:  LOWER CHEST: Mild patchy groundglass opacity in the left lower lobe.    LIVER: Within normal limits.  BILE DUCTS/PANCREAS:  *Moderate intrahepatic and hepatic biliary ductal dilatation. The common   duct measures 15 mm. There is abrupt cut off of the duct in its   intrapancreatic portion.*Pancreatic duct is markedly dilated, measuring   11 mm, with abrupt cutoff in the neck due to multiple intraductal calculi.  *Additional coarse pancreatic calcifications are identified compatible   with chronic pancreatitis.  *No definite findings to suggest acute pancreatitis, however evaluation   is limited due to paucity of peripancreatic fat.  *Cystic lesions in the neck and head measuring up to 2.7 cm.  GALLBLADDER: Markedly distended and containing multiple gallstones.  SPLEEN: Within normal limits.  PANCREAS: Within normal limits.  ADRENALS: Within normal limits.  KIDNEYS/URETERS: Within normal limits.    BLADDER: Within normal limits.  REPRODUCTIVE ORGANS: Prostate within normal limits.    BOWEL: No bowel obstruction. Appendix not visualized. Enteric tube with   tip in the distal esophagus PERITONEUM: Small amount of free pelvic fluid.  VESSELS: Multiple perigastric collateral vessels are identified. The   splenic vein is patent. There is focal narrowing of the splenic vein at   the portal confluence. Atherosclerotic arterial calcifications.  RETROPERITONEUM/LYMPH NODES: No lymphadenopathy.  ABDOMINAL WALL: Anasarca.  BONES: No acute abnormality.    IMPRESSION:  Chronic pancreatitis.    Pancreatic ductaldilatation due to multiple intraductal stones.    Biliary ductal dilatation the exact etiology which could be due to post   pancreatic stricture. Consider further evaluation with MRI or endoscopic   ultrasound as an occult pancreatic mass is not excluded.    Cystic pancreatic lesions most likely representing pseudocysts.    Cholelithiasis and marked gallbladder distention, without evidence of   acute cholecystitis.    Mild left lower lobe opacity suspicious for pneumonia.    Enteric tube with tipin the distal esophagus.    Findings were discussed with Dr. Harding by Dr. Newsome on March 14, 2022   8:40 AM.    --- End of Report ---        < end of copied text >  < from: MR MRCP w/wo IV Cont (03.15.22 @ 15:44) >  ACC: [id number] EXAM:  MR MRCP [facility] IC                          PROCEDURE DATE:  03/15/2022          INTERPRETATION:  CLINICAL INFORMATION: 56-year-old man with chronic   pancreatitis    COMPARISON: CT scan 03/14/2022    CONTRAST/COMPLICATIONS:  IV Contrast: Gadavist  6 cc administered   1.5 cc discarded  Oral Contrast: NONE  Complications: None reported at time of study completion    PROCEDURE:  MRI of the abdomen was performed.      FINDINGS:  LOWER CHEST: Small bilateral pleural effusions    LIVER: Within normal limits.  BILE DUCTS: Intra and extrahepatic biliary duct dilatation. Common duct   measures up to 13 mm.  GALLBLADDER: Distended with low insertion cystic duct  SPLEEN: Within normal limits.  PANCREAS: Dilated main pancreatic duct with intraluminal debris to the   level of the proximal pancreatic body measuring 12 mm. Heterogeneous   diffusion pancreatic head mass, approximately 6.2 x 4.9 cm suspicious for   neoplasm. (25:39)  Variant Hepatic Arterial Anatomy: None  Arterial Involvement (< 180, > 180): >180 degree common hepatic artery  Venous Involvement (<180, > 180, vessel deformity, vessel occlusion):   >180 proximal portal vein which is poorly visualized but appears patent   with narrowing on the recent CT scan  ADRENALS: Within normal limits.  KIDNEYS/URETERS: Within normal limits.    VISUALIZED PORTIONS:  BOWEL: Distended stomach with ingested material  PERITONEUM: Small ascites.  VESSELS: Within normal limits.  RETROPERITONEUM/LYMPH NODES: Enlarged diffusion weighted portacaval lymph   node 2.3 x 1.2 cm (25:43. Diffusion weighted interaorticocaval lymph node   10 mm (25:46). Diffusion-weighted left periaortic lymph node 1.5 x 1.4 cm   (25:43). Small additional diffusion-weighted para-aortic lymph nodes   (14:70 and 15:30)  ABDOMINAL WALL: Within normal limits.  BONES: Within normal limits.    IMPRESSION:  Pancreatic head mass suspicious for neoplasm with associated biliary and   pancreatic ductal dilatation. Recommend biopsy.  Upper abdominal lymphadenopathy  Distended stomach. Recommend clinical correlation for partial gastric   outlet obstruction    < end of copied text >

## 2022-03-18 NOTE — PROGRESS NOTE ADULT - SUBJECTIVE AND OBJECTIVE BOX
Patient seen and examined at bedside this am. No acute events. Tolerated regular diet and tolerating TF at 20cc/hr currently. States abdominal pain is the same, but has improved since admission. Denies nausea or vomiting. Denies fever, chills, chest pain, sob, n/v/d.     Vital Signs Last 24 Hrs  T(C): 36.6 (17 Mar 2022 21:19), Max: 36.6 (17 Mar 2022 21:19)  T(F): 97.9 (17 Mar 2022 21:19), Max: 97.9 (17 Mar 2022 21:19)  HR: 67 (17 Mar 2022 21:19) (67 - 67)  BP: 133/63 (17 Mar 2022 21:19) (133/63 - 165/81)  BP(mean): --  RR: 17 (17 Mar 2022 21:19) (17 - 18)  SpO2: 100% (17 Mar 2022 21:19) (100% - 100%)    I&O's Detail    17 Mar 2022 07:01  -  18 Mar 2022 07:00  --------------------------------------------------------  IN:    Glucerna 1.5: 80 mL  Total IN: 80 mL    OUT:    Nasogastric/Oral tube (mL): 0 mL    Voided (mL): 1875 mL  Total OUT: 1875 mL    Total NET: -1795 mL      Physical Exam:   General: AAOx3, malnourished, cachectic  Cardio: RRR, S1, S2   Pulmonary: equal chest rise B/L   Abdomen: soft, TTP epigastric, mildly distended, GJ tube in place, dressing c/d/i    Labs:                        10.3   11.18 )-----------( 461      ( 17 Mar 2022 05:04 )             31.2   03-18    129<L>  |  99  |  15  ----------------------------<  183<H>  4.5   |  26  |  0.37<L>    Ca    7.9<L>      18 Mar 2022 05:04  Phos  2.2     03-18  Mg     1.9     03-18    TPro  6.4  /  Alb  1.4<L>  /  TBili  0.8  /  DBili  x   /  AST  80<H>  /  ALT  51  /  AlkPhos  1238<H>  03-18

## 2022-03-18 NOTE — PROGRESS NOTE ADULT - ASSESSMENT
57 y/o male with PMHX of IVDA with heroin , opioid dependence on methadone, IDDM, chronic pancreatitis, abnormal weight loss over the last 1 year who was a transfer from [facility] ([place]) on 3/14/22  for possible ERCP.  Patient initially presented to outside hospital for worsening abdominal pain.  Patient had CT/ MRI/ US of the abdomen which showed chronic pancreatitis, gastric distention (for which he had an NGT),  intra/ extra hepatic biliary ductal dilatation, and fluid collection/  mass in the head of the pancreas compressing the SMV and proximal portal vein.  Upon admission, patient with significantly elevated ALK PHOS 1200.      Epigastric abdominal pain due to   Pancreatic mass,  associated with biliary and pancreatic duct dilatation , pancreatic stones with underlying chronic pancreatitis s/p ERCP, EUS s/p FNA  Partial gastric outlet obstruction due to severe duodenal inflamation s/p NGT now s/p GJ tube   Cholelithiasis and marked gallbladder distention without acute cholecystitis  Elevated CA 19-9   - 3/16/22 - s/p EUS and FNA of the pancreatic mass , ERCP, pancreatic sphincterotomy , s/p pancreatic stent , s/p GJ tube   -  s/p GJ tube- Now on TFs with recreational PO feeds   - watch for refeeding syndrome; replace Mg/Phos PRN   - complete workup with tumor markers/CT chest/Heme-Onc Cx    Hematemesis 3/15/22 Acute blood loss anemia due to upper GI bleeding   Iron deficiency   - s/p lovenox 40 3/14  - d/sherice   - Cont Protonix drip- change to IVP   - s/p  2 Units PRBC    Mild leukocytosis likely due to malignancy  LLL opacity suspected Pneumonia possible aspiration  - On CT abd there was suspicion of  LLL PNA and was on ZOsyn  CT chest does not show any consolidation - therefore there is no pneumonia and will stop Zosyn  There is possible discitis on CT imaging and will get MRI to r/o DISCITIS/OM vs Metastatic lesion   Discussed with DR Lewis and started on DOxy for possible discitis   Patient has a PICC (he came with it)  and will likley dc especially in light of IVDA     DM type2 with A1C 9.9 with hypoglycemic episode on 3/15   Now ON TFs and PO diet, started lantus and RISS q6H     Abnormal Weight Loss   Severe Protein Calorie Malnutrition/ Cachexia      Severe hypoalbuminemia  Hyponatremia 2/2 pain  DC IVFs place on fluid restriction   likely related to pain     IVDA/ Opioid Dependence  - Patient was on methadone 60 mg daily as per notes.    - palliative team consult - for pain management     DVT Proph:  venodynes - H&H stable, will resume lovenox tmr     Dispo- on PO and TFs, monitor Na, change to DOXY, f/u MRI T spine, will likely dc PICC

## 2022-03-18 NOTE — PROGRESS NOTE ADULT - SUBJECTIVE AND OBJECTIVE BOX
Negative for above Patient is a 56y old Male who presents with a chief complaint of abdominal pain-- transfer for possible ERCP.    HPI : 57 y/o M admitted for abdominal pain. No acute events overnight, tolerating tube feedings well. Reports abdominal pain is improving. Pt denies headache, dizziness, chest pain, palpitations, cough, SOB, n/v/d, urinary symptoms, fevers, chills at this time.     MEDICATIONS  (STANDING):  bisacodyl Suppository 10 milliGRAM(s) Rectal <User Schedule>  dextrose 40% Gel 15 Gram(s) Oral once  dextrose 5%. 1000 milliLiter(s) (50 mL/Hr) IV Continuous <Continuous>  dextrose 5%. 1000 milliLiter(s) (100 mL/Hr) IV Continuous <Continuous>  dextrose 50% Injectable 25 Gram(s) IV Push once  dextrose 50% Injectable 12.5 Gram(s) IV Push once  dextrose 50% Injectable 25 Gram(s) IV Push once  folic acid 1 milliGRAM(s) Oral daily  glucagon  Injectable 1 milliGRAM(s) IntraMuscular once  insulin glargine Injectable (LANTUS) 15 Unit(s) SubCutaneous every morning  insulin lispro (ADMELOG) corrective regimen sliding scale   SubCutaneous every 6 hours  methadone    Tablet 60 milliGRAM(s) Oral daily  multivitamin 1 Tablet(s) Oral daily  pancrelipase (ZENPEP 20,000 Lipase Units) 1 Capsule(s) Oral three times a day with meals  pantoprazole  Injectable 40 milliGRAM(s) IV Push every 12 hours  piperacillin/tazobactam IVPB.. 3.375 Gram(s) IV Intermittent every 8 hours  potassium phosphate / sodium phosphate Powder (PHOS-NaK) 1 Packet(s) Oral three times a day  thiamine 100 milliGRAM(s) Oral daily    MEDICATIONS  (PRN):  acetaminophen     Tablet .. 650 milliGRAM(s) Oral every 6 hours PRN Temp greater or equal to 38C (100.4F), Mild Pain (1 - 3)  ALBUTerol    90 MICROgram(s) HFA Inhaler 2 Puff(s) Inhalation every 6 hours PRN Bronchospasm  aluminum hydroxide/magnesium hydroxide/simethicone Suspension 30 milliLiter(s) Oral every 4 hours PRN Dyspepsia  melatonin 3 milliGRAM(s) Oral at bedtime PRN Insomnia  morphine  - Injectable 8 milliGRAM(s) IV Push every 3 hours PRN Severe Pain (7 - 10)  morphine  - Injectable 6 milliGRAM(s) IV Push every 3 hours PRN Moderate Pain (4 - 6)  ondansetron Injectable 4 milliGRAM(s) IV Push every 8 hours PRN Nausea and/or Vomiting  zolpidem 5 milliGRAM(s) Oral at bedtime PRN Insomnia    Vital Signs Last 24 Hrs  T(C): 37.4 (18 Mar 2022 09:40), Max: 37.4 (18 Mar 2022 09:40)  T(F): 99.3 (18 Mar 2022 09:40), Max: 99.3 (18 Mar 2022 09:40)  HR: 84 (18 Mar 2022 09:40) (67 - 84)  BP: 142/77 (18 Mar 2022 09:40) (133/63 - 165/81)  BP(mean): --  RR: 18 (18 Mar 2022 09:40) (17 - 18)  SpO2: 100% (18 Mar 2022 09:40) (100% - 100%)    PHYSICAL EXAM:  Constitutional: chronically ill appearing, thin, frail  HEENT: EOMI, MMM  Respiratory: CTAB  Cardiovascular: S1 and S2, RRR, no M/G/R  Gastrointestinal: BS+, nondistended, diffuse abdominal tenderness to moderate palpation, no rebound, new GJ tube present to left abdomen, dressing with minimal amount of blood, dressing changed. Bumper in place.  Extremities: No peripheral edema  Vascular: 2+ peripheral pulses  Neurological: A/O x 3, no focal deficits  Psychiatric: Normal mood, normal affect  Skin: No rashes, sacral decubitus    LABS:                        10.3   11.18 )-----------( 461      ( 17 Mar 2022 05:04 )             31.2     03-18    129<L>  |  99  |  15  ----------------------------<  183<H>  4.5   |  26  |  0.37<L>    Ca    7.9<L>      18 Mar 2022 05:04  Phos  2.2     03-18  Mg     1.9     03-18    TPro  6.4  /  Alb  1.4<L>  /  TBili  0.8  /  DBili  x   /  AST  80<H>  /  ALT  51  /  AlkPhos  1238<H>  03-18      LIVER FUNCTIONS - ( 18 Mar 2022 05:04 )  Alb: 1.4 g/dL / Pro: 6.4 gm/dL / ALK PHOS: 1238 U/L / ALT: 51 U/L / AST: 80 U/L / GGT: x             RADIOLOGY & ADDITIONAL STUDIES: reviewed Patient is a 56y old Male who presents with a chief complaint of abdominal pain-- transfer for possible ERCP. Follow up for abdominal pain, GJ tube plaement.     HPI : 55 y/o M admitted for abdominal pain. No acute events overnight, tolerating tube feedings well. Reports abdominal pain is improving. Pt denies headache, dizziness, chest pain, palpitations, cough, SOB, n/v/d, urinary symptoms, fevers, chills at this time.     Pt with post procdure oozing at the site of the gastrostomy, bumper was tightened on Wednesday. No issues per patient today.     MEDICATIONS  (STANDING):  bisacodyl Suppository 10 milliGRAM(s) Rectal <User Schedule>  dextrose 40% Gel 15 Gram(s) Oral once  dextrose 5%. 1000 milliLiter(s) (50 mL/Hr) IV Continuous <Continuous>  dextrose 5%. 1000 milliLiter(s) (100 mL/Hr) IV Continuous <Continuous>  dextrose 50% Injectable 25 Gram(s) IV Push once  dextrose 50% Injectable 12.5 Gram(s) IV Push once  dextrose 50% Injectable 25 Gram(s) IV Push once  folic acid 1 milliGRAM(s) Oral daily  glucagon  Injectable 1 milliGRAM(s) IntraMuscular once  insulin glargine Injectable (LANTUS) 15 Unit(s) SubCutaneous every morning  insulin lispro (ADMELOG) corrective regimen sliding scale   SubCutaneous every 6 hours  methadone    Tablet 60 milliGRAM(s) Oral daily  multivitamin 1 Tablet(s) Oral daily  pancrelipase (ZENPEP 20,000 Lipase Units) 1 Capsule(s) Oral three times a day with meals  pantoprazole  Injectable 40 milliGRAM(s) IV Push every 12 hours  piperacillin/tazobactam IVPB.. 3.375 Gram(s) IV Intermittent every 8 hours  potassium phosphate / sodium phosphate Powder (PHOS-NaK) 1 Packet(s) Oral three times a day  thiamine 100 milliGRAM(s) Oral daily    MEDICATIONS  (PRN):  acetaminophen     Tablet .. 650 milliGRAM(s) Oral every 6 hours PRN Temp greater or equal to 38C (100.4F), Mild Pain (1 - 3)  ALBUTerol    90 MICROgram(s) HFA Inhaler 2 Puff(s) Inhalation every 6 hours PRN Bronchospasm  aluminum hydroxide/magnesium hydroxide/simethicone Suspension 30 milliLiter(s) Oral every 4 hours PRN Dyspepsia  melatonin 3 milliGRAM(s) Oral at bedtime PRN Insomnia  morphine  - Injectable 8 milliGRAM(s) IV Push every 3 hours PRN Severe Pain (7 - 10)  morphine  - Injectable 6 milliGRAM(s) IV Push every 3 hours PRN Moderate Pain (4 - 6)  ondansetron Injectable 4 milliGRAM(s) IV Push every 8 hours PRN Nausea and/or Vomiting  zolpidem 5 milliGRAM(s) Oral at bedtime PRN Insomnia    Vital Signs Last 24 Hrs  T(C): 37.4 (18 Mar 2022 09:40), Max: 37.4 (18 Mar 2022 09:40)  T(F): 99.3 (18 Mar 2022 09:40), Max: 99.3 (18 Mar 2022 09:40)  HR: 84 (18 Mar 2022 09:40) (67 - 84)  BP: 142/77 (18 Mar 2022 09:40) (133/63 - 165/81)  BP(mean): --  RR: 18 (18 Mar 2022 09:40) (17 - 18)  SpO2: 100% (18 Mar 2022 09:40) (100% - 100%)    PHYSICAL EXAM:  Constitutional: chronically ill appearing, thin, frail  HEENT: EOMI, MMM  Respiratory: CTAB  Cardiovascular: S1 and S2, RRR, no M/G/R  Gastrointestinal: BS+, nondistended, diffuse abdominal tenderness to moderate palpation, no rebound, new GJ tube present to left abdomen, dressing with minimal amount of blood, dressing changed. Bumper in place.  Extremities: No peripheral edema  Vascular: 2+ peripheral pulses  Neurological: A/O x 3, no focal deficits  Psychiatric: Normal mood, normal affect  Skin: No rashes, sacral decubitus    LABS:                        10.3   11.18 )-----------( 461      ( 17 Mar 2022 05:04 )             31.2     03-18    129<L>  |  99  |  15  ----------------------------<  183<H>  4.5   |  26  |  0.37<L>    Ca    7.9<L>      18 Mar 2022 05:04  Phos  2.2     03-18  Mg     1.9     03-18    TPro  6.4  /  Alb  1.4<L>  /  TBili  0.8  /  DBili  x   /  AST  80<H>  /  ALT  51  /  AlkPhos  1238<H>  03-18      LIVER FUNCTIONS - ( 18 Mar 2022 05:04 )  Alb: 1.4 g/dL / Pro: 6.4 gm/dL / ALK PHOS: 1238 U/L / ALT: 51 U/L / AST: 80 U/L / GGT: x             RADIOLOGY & ADDITIONAL STUDIES: reviewed

## 2022-03-18 NOTE — PROGRESS NOTE ADULT - SUBJECTIVE AND OBJECTIVE BOX
57 y/o male with PMHX of IVDA with heroin(  last used 2 months ago ) , opioid dependence on methadone, IDDM, chronic pancreatitis, and recent significant weight loss over the last 1 year who was a transfer from [facility] on 3/14/22  for possible ERCP.  Patient initially presented to outside hospital for worsening abdominal pain.  Patient had CT/ MRI/ US of the abdomen which showed chronic pancreatitis, gastric distention (for which he had an NGT),  intra/ extra hepatic biliary ductal dilatation, and fluid collection/ ? mass in the head of the pancreas compressing the SMV and proximal portal vein.  Patient was transferred to  for possible need for ERCP and further GI evaluation.  Upon admission, patient with significantly elevated ALKPHOS 1200.       Hospital course :   3/15/22- events of last night noted. Had coffee-ground emesis and started on Protonix drip. NGT to suction. NPO  3/16 - pt seen and examined, + NGT in place, reports severe abdominal pain, +constipation, denies cp, dyspnea, afebrile  3/17 - no epigastric/low chest pain, restrosternal earlier and trops negative; no further cp palps sob; abdo pain fluctuating   3/18 - no cp palps sob - has back pain and admit to IVDA but states it was more than 2 mos ago, patient ate lunch and has TFs as well     PHYSICAL EXAM:  Vital Signs Last 24 Hrs  T(F): 98.2 (18 Mar 2022 15:36), Max: 99.3 (18 Mar 2022 09:40)  HR: 77 (18 Mar 2022 15:36) (67 - 91)  BP: 140/69 (18 Mar 2022 15:36) (132/77 - 142/77)  RR: 18 (18 Mar 2022 15:36) (17 - 18)  SpO2: 100% (18 Mar 2022 15:36) (99% - 100%)  GENERAL: cachectic,  NAD  HEAD:  Atraumatic, Normocephalic  EYES: EOMI, PERRLA, conjunctiva and sclera clear  HEENT: NGT+with black discharge   NECK: Supple, No JVD  NERVOUS SYSTEM:  Alert & Oriented X2, moves all extremities  CHEST/LUNG: Clear to auscultation bilaterally; No rales, rhonchi, wheezing, or rubs  HEART: Regular rate and rhythm; No murmurs, rubs, or gallops  ABDOMEN: Soft, diffusely sensitive to palpations, +BS, GJ tube present   GENITOURINARY- Voiding, no palpable bladder  EXTREMITIES:  2+ Peripheral Pulses, No clubbing, cyanosis, or edema  MUSCULOSKELETAL No muscle tenderness, Muscle tone normal, No joint tenderness, no Joint swelling, Joint range of motion-normal    RADIOLOGY & ADDITIONAL TESTS:  MR MRCP w/wo IV Cont (03.15.22 @ 15:44) >  Pancreatic head mass suspicious for neoplasm with associated biliary and   pancreatic ductal dilatation. Recommend biopsy.  Upper abdominal lymphadenopathy  Distended stomach. Recommend clinical correlation for partial gastric   outlet obstruction    Xray Abdomen 1 View PORTABLE -Urgent (Xray Abdomen 1 View PORTABLE -Urgent .) (03.15.22 @ 10:53) >  Feeding tube in satisfactory position.  Mild to moderate pulmonary venous congestion/perihilar interstitial   infiltrates, new    CT ABDOMEN AND PELVIS IC                        PROCEDURE DATE:  03/14/2022    LOWER CHEST: Mild patchy groundglass opacity in the left lower lobe.  LIVER: Within normal limits.  BILE DUCTS/PANCREAS:  Moderate intrahepatic and hepatic biliary ductal dilatation. The common   duct measures 15 mm. There is abrupt cut off of the duct in its   intrapancreatic portion.*Pancreatic duct is markedly dilated, measuring   11 mm, with abrupt cutoff in the neck due to multiple intraductal calculi.  Additional coarse pancreatic calcifications are identified compatible   with chronic pancreatitis.  *No definite findings to suggest acute pancreatitis, however evaluation   is limited due to paucity of peripancreatic fat.  *Cystic lesions in the neck and head measuring up to 2.7 cm.  GALLBLADDER: Markedly distended and containing multiple gallstones.  IMPRESSION:  Chronic pancreatitis.  Pancreatic ductal dilatation due to multiple intraductal stones.  Biliary ductal dilatation the exact etiology which could be due to post   pancreatic stricture. Consider further evaluation with MRI or endoscopic   ultrasound as an occult pancreatic mass is not excluded.  Cystic pancreatic lesions most likely representing pseudocysts.  Cholelithiasis and marked gallbladder distention, without evidence of   acute cholecystitis.  Mild left lower lobe opacity suspicious for pneumonia.  Enteric tube with tip in the distal esophagus.    LABS: All Labs Reviewed:                        10.3   11.18 )-----------( 461      ( 17 Mar 2022 05:04 )             31.2   129<L>  |  99  |  15  ----------------------------<  183<H>  4.5   |  26  |  0.37<L>  Ca    7.9<L>      18 Mar 2022 05:04  Phos  2.2     03-18  Mg     1.9     03-18  TPro  6.4  /  Alb  1.4<L>  /  TBili  0.8  /  DBili  x   /  AST  80<H>  /  ALT  51  /  AlkPhos  1238<H>  03-18    MEDS:   acetaminophen     Tablet .. 650 milliGRAM(s) Oral every 6 hours PRN  ALBUTerol    90 MICROgram(s) HFA Inhaler 2 Puff(s) Inhalation every 6 hours PRN  aluminum hydroxide/magnesium hydroxide/simethicone Suspension 30 milliLiter(s) Oral every 4 hours PRN  bisacodyl Suppository 10 milliGRAM(s) Rectal <User Schedule>  doxycycline hyclate Capsule 100 milliGRAM(s) Oral every 12 hours  folic acid 1 milliGRAM(s) Oral daily  insulin glargine Injectable (LANTUS) 15 Unit(s) SubCutaneous every morning  insulin lispro (ADMELOG) corrective regimen sliding scale   SubCutaneous every 6 hours  melatonin 3 milliGRAM(s) Oral at bedtime PRN  methadone    Tablet 60 milliGRAM(s) Oral daily  morphine   Solution 15 milliGRAM(s) Oral every 4 hours PRN  morphine   Solution 20 milliGRAM(s) Oral every 4 hours PRN  multivitamin 1 Tablet(s) Oral daily  ondansetron Injectable 4 milliGRAM(s) IV Push every 8 hours PRN  pancrelipase (ZENPEP 20,000 Lipase Units) 1 Capsule(s) Oral three times a day with meals  pantoprazole  Injectable 40 milliGRAM(s) IV Push every 12 hours  potassium phosphate / sodium phosphate Powder (PHOS-NaK) 1 Packet(s) Oral three times a day  thiamine 100 milliGRAM(s) Oral daily  zolpidem 5 milliGRAM(s) Oral at bedtime PRN

## 2022-03-18 NOTE — CONSULT NOTE ADULT - SUBJECTIVE AND OBJECTIVE BOX
HPI:  55 y/o male with PMHX of IVDA with heroin-- last used 2 months ago as per patient, opioid dependence on methadone, IDDM, chronic pancreatitis, and recent significant weight loss over the last 1 year who was a transfer from [facility]) for possible ERCP.  Patient initially presented to outside hospital for worsening abdominal pain.  Patient had CT/ MRI/ US of the abdomen which showed chronic pancreatitis, gastric distention (for which he had an NGT),  intra/ extra hepatic biliary ductal dilatation, and fluid collection/ ? mass in the head of the pancreas compressing the SMV and proximal portal vein.  Patient was transferred to  for possible need for ERCP and further GI evaluation.  Upon admission, patient with significantly elevated ALKPHOS 1200.      < from: CT Chest w/ IV Cont (03.17.22 @ 10:35) >  NTERPRETATION:  INDICATION: Chest pain, new pancreatic cancer    TECHNIQUE: Volumetric images of the chest were obtained after the   administration of 90 mL of Omnipaque 350. Maximum intensity projection   images were generated.    COMPARISON: None.    FINDINGS:    PULMONARY ANGIOGRAM:  No pulmonary embolism.    LUNGS/AIRWAYS/PLEURA: Patent trachea and bronchi. Mild dependent   atelectasis in the lower lobes. Trace paraseptal emphysema. Few small   clustered ill-defined nodules in the superior and posterior right lower   lobe. 3 mm solid nodule in the right upper lobe (2-29). Trace pleural   effusions.    LYMPH NODES/MEDIASTINUM: No enlarged lymph nodes.    HEART/VASCULATURE: Normal heart size. Unremarkable pericardium. Normal   caliber aorta.    UPPER ABDOMEN: Gastrostomy tube and nasogastric tube in the stomach.   Intrahepatic and extrahepatic biliary ductal dilatation. Pancreatic mass,   calcifications, and ductal dilatation, best characterized on recent   abdominal imaging.    BONES/SOFT TISSUES: Remote left rib fractures. Erosions and sclerosis of   the adjacent endplates of the T6-T7 disc space, and thickening of the   adjacent paraspinal soft tissues. Mild loss of height of T6.      IMPRESSION:    No pulmonary embolism.    Few ill-defined clustered nodules in the right lower lobe, likely related   impacted bronchioles.    3 mm right upper lobe nodule. Recommend follow-up per protocol of primary   pancreatic cancer.    Trace pleural effusions.    Vertebral body erosions and sclerosis abutting the the T6-T7 disc space   and thickening of the paraspinal soft tissues, raising suspicion for   discitis/osteomyelitis.    < end of copied text >    < from: CT Abdomen and Pelvis w/ IV Cont (03.14.22 @ 07:53) >  ACC: [id number] EXAM:  CT ABDOMEN AND PELVIS IC                          PROCEDURE DATE:  03/14/2022          INTERPRETATION:  CLINICAL INFORMATION: Pancreatitis suspected    COMPARISON: None.    CONTRAST/COMPLICATIONS:  IV Contrast: Omnipaque 350  90 cc administered   10 cc discarded  Oral Contrast: NONE  Complications: None reported at time of study completion    PROCEDURE:  CT of the Abdomen and Pelvis was performed.  Sagittal and coronal reformats were performed.    FINDINGS:  LOWER CHEST: Mild patchy groundglass opacity in the left lower lobe.    LIVER: Within normal limits.  BILE DUCTS/PANCREAS:  *Moderate intrahepatic and hepatic biliary ductal dilatation. The common   duct measures 15 mm. There is abrupt cut off of the duct in its   intrapancreatic portion.*Pancreatic duct is markedly dilated, measuring   11 mm, with abrupt cutoff in the neck due to multiple intraductal calculi.  *Additional coarse pancreatic calcifications are identified compatible   with chronic pancreatitis.  *No definite findings to suggest acute pancreatitis, however evaluation   is limited due to paucity of peripancreatic fat.  *Cystic lesions in the neck and head measuring up to 2.7 cm.  GALLBLADDER: Markedly distended and containing multiple gallstones.  SPLEEN: Within normal limits.  PANCREAS: Within normal limits.  ADRENALS: Within normal limits.  KIDNEYS/URETERS: Within normal limits.    BLADDER: Within normal limits.  REPRODUCTIVE ORGANS: Prostate within normal limits.    BOWEL: No bowel obstruction. Appendix not visualized. Enteric tube with   tip in the distal esophagus PERITONEUM: Small amount of free pelvic fluid.  VESSELS: Multiple perigastric collateral vessels are identified. The   splenic vein is patent. There is focal narrowing of the splenic vein at   the portal confluence. Atherosclerotic arterial calcifications.  RETROPERITONEUM/LYMPH NODES: No lymphadenopathy.  ABDOMINAL WALL: Anasarca.  BONES: No acute abnormality.    IMPRESSION:  Chronic pancreatitis.    Pancreatic ductaldilatation due to multiple intraductal stones.    Biliary ductal dilatation the exact etiology which could be due to post   pancreatic stricture. Consider further evaluation with MRI or endoscopic   ultrasound as an occult pancreatic mass is not excluded.    Cystic pancreatic lesions most likely representing pseudocysts.    Cholelithiasis and marked gallbladder distention, without evidence of   acute cholecystitis.    Mild left lower lobe opacity suspicious for pneumonia.    Enteric tube with tipin the distal esophagus.    Findings were discussed with Dr. Harding by Dr. Newsome on March 14, 2022   8:40 AM.    < end of copied text >    ad  PAST MEDICAL & SURGICAL HISTORY:  IVDA  Opioid dependance  Pancreatitis    FAMILY HISTORY:  Unable to obtain from patient at this time.    Social History:    Previously living in SNF.    Transfer from [facility].    +Drug use-- IVDA / heroin/ fentanyl.    No tob use.      Allergies:  No Known Allergies   (14 Mar 2022 11:54)      PAST MEDICAL & SURGICAL HISTORY:      MEDICATIONS  (STANDING):  bisacodyl Suppository 10 milliGRAM(s) Rectal <User Schedule>  dextrose 40% Gel 15 Gram(s) Oral once  dextrose 5%. 1000 milliLiter(s) (50 mL/Hr) IV Continuous <Continuous>  dextrose 5%. 1000 milliLiter(s) (100 mL/Hr) IV Continuous <Continuous>  dextrose 50% Injectable 25 Gram(s) IV Push once  dextrose 50% Injectable 12.5 Gram(s) IV Push once  dextrose 50% Injectable 25 Gram(s) IV Push once  folic acid 1 milliGRAM(s) Oral daily  glucagon  Injectable 1 milliGRAM(s) IntraMuscular once  insulin glargine Injectable (LANTUS) 15 Unit(s) SubCutaneous every morning  insulin lispro (ADMELOG) corrective regimen sliding scale   SubCutaneous every 6 hours  methadone    Tablet 60 milliGRAM(s) Oral daily  multivitamin 1 Tablet(s) Oral daily  pancrelipase (ZENPEP 20,000 Lipase Units) 1 Capsule(s) Oral three times a day with meals  pantoprazole  Injectable 40 milliGRAM(s) IV Push every 12 hours  thiamine 100 milliGRAM(s) Oral daily    MEDICATIONS  (PRN):  acetaminophen     Tablet .. 650 milliGRAM(s) Oral every 6 hours PRN Temp greater or equal to 38C (100.4F), Mild Pain (1 - 3)  ALBUTerol    90 MICROgram(s) HFA Inhaler 2 Puff(s) Inhalation every 6 hours PRN Bronchospasm  aluminum hydroxide/magnesium hydroxide/simethicone Suspension 30 milliLiter(s) Oral every 4 hours PRN Dyspepsia  melatonin 3 milliGRAM(s) Oral at bedtime PRN Insomnia  morphine  - Injectable 8 milliGRAM(s) IV Push every 3 hours PRN Severe Pain (7 - 10)  morphine  - Injectable 6 milliGRAM(s) IV Push every 3 hours PRN Moderate Pain (4 - 6)  ondansetron Injectable 4 milliGRAM(s) IV Push every 8 hours PRN Nausea and/or Vomiting  zolpidem 5 milliGRAM(s) Oral at bedtime PRN Insomnia      Allergies    No Known Allergies    Intolerances        SOCIAL HISTORY:    FAMILY HISTORY:      Vital Signs Last 24 Hrs  T(C): 36.6 (17 Mar 2022 21:19), Max: 36.6 (17 Mar 2022 21:19)  T(F): 97.9 (17 Mar 2022 21:19), Max: 97.9 (17 Mar 2022 21:19)  HR: 67 (17 Mar 2022 21:19) (67 - 67)  BP: 133/63 (17 Mar 2022 21:19) (133/63 - 165/81)  BP(mean): --  RR: 17 (17 Mar 2022 21:19) (17 - 18)  SpO2: 100% (17 Mar 2022 21:19) (100% - 100%)      LABS:                        10.3   11.18 )-----------( 461      ( 17 Mar 2022 05:04 )             31.2     03-18    129<L>  |  99  |  15  ----------------------------<  183<H>  4.5   |  26  |  0.37<L>    Ca    7.9<L>      18 Mar 2022 05:04  Phos  2.2     03-18  Mg     1.9     03-18    TPro  6.4  /  Alb  1.4<L>  /  TBili  0.8  /  DBili  x   /  AST  80<H>  /  ALT  51  /  AlkPhos  1238<H>  03-18          RADIOLOGY & ADDITIONAL STUDIES:

## 2022-03-18 NOTE — PROGRESS NOTE ADULT - ASSESSMENT
55y/o M pmhx IVDA, EtOH abuse, DM, HTN presents with abdominal pain and coffee ground emesis with CT and MRCP findings of double duct sign and GOO 2/2 pancreatic head mass, s/p GJ tube placement     Plan:   - pain control prn  - monitor VS   - regular diet  - continue with TF to goal  - f/u GI recommendations  - f/u pathology  - rest of care as per primary team  - stable for discharge with services from surgical oncology perspective with follow up in 1 month    Plan discussed with Dr. Calvert

## 2022-03-18 NOTE — CONSULT NOTE ADULT - CONSULT REASON
R.O Pancreatic cancer
pancreatic mass
Abdominal pain, biliary and pancreatic ductal dilatation
Pancreatic mass
abdominal pain
disciitis

## 2022-03-18 NOTE — CONSULT NOTE ADULT - ASSESSMENT
56 y.o male with multiple medical issues, anorexia, weight loss, malnutrition, abdominal pains, pancreatic mass   poor performance status ( ECOG 2-3)   S/P GT/ Biopsy; pathology pending    If pancreatic neoplasm is confirmed, focus should be on symptom management / palliative care  No role for Surgery/ RT and chemotherapy will not change course of disease and given   comorbidities/ malnutrition low Alb/ poor performance status, side effects would far exceed meaningful benefit

## 2022-03-18 NOTE — CONSULT NOTE ADULT - CONSULT REQUESTED DATE/TIME
16-Mar-2022 09:19
18-Mar-2022 08:45
14-Mar-2022 12:00
15-Mar-2022 10:21
18-Mar-2022 10:20
18-Mar-2022 15:26

## 2022-03-19 LAB
ANION GAP SERPL CALC-SCNC: 5 MMOL/L — SIGNIFICANT CHANGE UP (ref 5–17)
BUN SERPL-MCNC: 14 MG/DL — SIGNIFICANT CHANGE UP (ref 7–23)
CALCIUM SERPL-MCNC: 8.7 MG/DL — SIGNIFICANT CHANGE UP (ref 8.5–10.1)
CHLORIDE SERPL-SCNC: 98 MMOL/L — SIGNIFICANT CHANGE UP (ref 96–108)
CO2 SERPL-SCNC: 28 MMOL/L — SIGNIFICANT CHANGE UP (ref 22–31)
CREAT SERPL-MCNC: 0.35 MG/DL — LOW (ref 0.5–1.3)
EGFR: 134 ML/MIN/1.73M2 — SIGNIFICANT CHANGE UP
GLUCOSE SERPL-MCNC: 105 MG/DL — HIGH (ref 70–99)
GLUCOSE SERPL-MCNC: 40 MG/DL — CRITICAL LOW (ref 70–99)
HCT VFR BLD CALC: 33.1 % — LOW (ref 39–50)
HGB BLD-MCNC: 10.8 G/DL — LOW (ref 13–17)
MAGNESIUM SERPL-MCNC: 2.1 MG/DL — SIGNIFICANT CHANGE UP (ref 1.6–2.6)
MCHC RBC-ENTMCNC: 26.5 PG — LOW (ref 27–34)
MCHC RBC-ENTMCNC: 32.6 GM/DL — SIGNIFICANT CHANGE UP (ref 32–36)
MCV RBC AUTO: 81.3 FL — SIGNIFICANT CHANGE UP (ref 80–100)
PLATELET # BLD AUTO: 637 K/UL — HIGH (ref 150–400)
POTASSIUM SERPL-MCNC: 3.8 MMOL/L — SIGNIFICANT CHANGE UP (ref 3.5–5.3)
POTASSIUM SERPL-SCNC: 3.8 MMOL/L — SIGNIFICANT CHANGE UP (ref 3.5–5.3)
RBC # BLD: 4.07 M/UL — LOW (ref 4.2–5.8)
RBC # FLD: 15.2 % — HIGH (ref 10.3–14.5)
SODIUM SERPL-SCNC: 131 MMOL/L — LOW (ref 135–145)
WBC # BLD: 14.08 K/UL — HIGH (ref 3.8–10.5)
WBC # FLD AUTO: 14.08 K/UL — HIGH (ref 3.8–10.5)

## 2022-03-19 PROCEDURE — 72157 MRI CHEST SPINE W/O & W/DYE: CPT | Mod: 26

## 2022-03-19 PROCEDURE — 99232 SBSQ HOSP IP/OBS MODERATE 35: CPT

## 2022-03-19 RX ORDER — ACETAMINOPHEN 500 MG
1000 TABLET ORAL ONCE
Refills: 0 | Status: COMPLETED | OUTPATIENT
Start: 2022-03-19 | End: 2022-03-19

## 2022-03-19 RX ORDER — DEXTROSE 50 % IN WATER 50 %
25 SYRINGE (ML) INTRAVENOUS ONCE
Refills: 0 | Status: COMPLETED | OUTPATIENT
Start: 2022-03-19 | End: 2022-03-19

## 2022-03-19 RX ADMIN — PANTOPRAZOLE SODIUM 40 MILLIGRAM(S): 20 TABLET, DELAYED RELEASE ORAL at 09:33

## 2022-03-19 RX ADMIN — MORPHINE SULFATE 20 MILLIGRAM(S): 50 CAPSULE, EXTENDED RELEASE ORAL at 19:54

## 2022-03-19 RX ADMIN — Medication 4: at 17:43

## 2022-03-19 RX ADMIN — ENOXAPARIN SODIUM 40 MILLIGRAM(S): 100 INJECTION SUBCUTANEOUS at 06:47

## 2022-03-19 RX ADMIN — MORPHINE SULFATE 20 MILLIGRAM(S): 50 CAPSULE, EXTENDED RELEASE ORAL at 09:33

## 2022-03-19 RX ADMIN — MORPHINE SULFATE 20 MILLIGRAM(S): 50 CAPSULE, EXTENDED RELEASE ORAL at 14:21

## 2022-03-19 RX ADMIN — Medication 1 MILLIGRAM(S): at 09:30

## 2022-03-19 RX ADMIN — Medication 1 CAPSULE(S): at 14:11

## 2022-03-19 RX ADMIN — MORPHINE SULFATE 20 MILLIGRAM(S): 50 CAPSULE, EXTENDED RELEASE ORAL at 13:51

## 2022-03-19 RX ADMIN — Medication 3 MILLIGRAM(S): at 21:36

## 2022-03-19 RX ADMIN — Medication 4: at 21:29

## 2022-03-19 RX ADMIN — Medication 1 PACKET(S): at 06:48

## 2022-03-19 RX ADMIN — MORPHINE SULFATE 20 MILLIGRAM(S): 50 CAPSULE, EXTENDED RELEASE ORAL at 23:42

## 2022-03-19 RX ADMIN — ZOLPIDEM TARTRATE 5 MILLIGRAM(S): 10 TABLET ORAL at 02:50

## 2022-03-19 RX ADMIN — Medication 1 TABLET(S): at 09:31

## 2022-03-19 RX ADMIN — Medication 100 MILLIGRAM(S): at 09:30

## 2022-03-19 RX ADMIN — ONDANSETRON 4 MILLIGRAM(S): 8 TABLET, FILM COATED ORAL at 09:33

## 2022-03-19 RX ADMIN — Medication 100 MILLIGRAM(S): at 09:36

## 2022-03-19 RX ADMIN — Medication 400 MILLIGRAM(S): at 03:09

## 2022-03-19 RX ADMIN — MORPHINE SULFATE 20 MILLIGRAM(S): 50 CAPSULE, EXTENDED RELEASE ORAL at 02:30

## 2022-03-19 RX ADMIN — MORPHINE SULFATE 20 MILLIGRAM(S): 50 CAPSULE, EXTENDED RELEASE ORAL at 10:03

## 2022-03-19 RX ADMIN — Medication 2: at 14:10

## 2022-03-19 RX ADMIN — METHADONE HYDROCHLORIDE 60 MILLIGRAM(S): 40 TABLET ORAL at 09:28

## 2022-03-19 RX ADMIN — Medication 1 CAPSULE(S): at 08:55

## 2022-03-19 RX ADMIN — PANTOPRAZOLE SODIUM 40 MILLIGRAM(S): 20 TABLET, DELAYED RELEASE ORAL at 21:29

## 2022-03-19 RX ADMIN — Medication 25 GRAM(S): at 06:46

## 2022-03-19 RX ADMIN — Medication 100 MILLIGRAM(S): at 21:29

## 2022-03-19 RX ADMIN — MORPHINE SULFATE 20 MILLIGRAM(S): 50 CAPSULE, EXTENDED RELEASE ORAL at 20:50

## 2022-03-19 NOTE — CHART NOTE - NSCHARTNOTEFT_GEN_A_CORE
Clinical Nutrition BRIEF NOTE    *56 year old male admitted for acute pancreatitis without infection or necrosis. Pt w/ PMH IVDA w/ heroin, opioid dependence (on methadone), IDDM, chronic pancreatitis w/ possible mass on abdomen, recent significant wt loss x 1 year. Transfer from [facility] - admitted for worsening abdominal pain.     *s/p ERCP and G-J tube placement on 3/16/22 with start of TF on 3/17/22.     *per flowsheet, pt consumed 100% of breakfast, 75% of lunch, and 90% of dinner.  Which provided ~1136Kcal and 67g protein; meeting ~83% of estimated calorie needs and ~96% of estimated protein needs.  pt now reporting nausea, rec'd stop TF.  add glucerna BID to optimize PO intake.    Estimated Needs: based on 39Kg (wt from bed scale wt obtained by RD on 3/15)  Calories: 9398-8665 Kcal (35-40 Kcal/Kg)  Protein: 70.2-78 g (1.8-2.0 g/Kg)  Fluids: 3651-5449 mL (35-40 mL/Kg)    RECOMMENDATIONS:  1) stop TF  2) change Diet Rx to consistent carb with glucerna BID  3) continue to document all PO intake in EMR    *will monitor and adjust treatement plan prn*  Angela Cuortney MA, RDN, CDN, CNSC [phone]

## 2022-03-19 NOTE — PROGRESS NOTE ADULT - ASSESSMENT
55 y/o male with PMHX of IVDA with heroin , opioid dependence on methadone, IDDM, chronic pancreatitis, abnormal weight loss over the last 1 year who was a transfer from [facility] ([facility] on 3/14/22  for possible ERCP.  Patient initially presented to outside hospital for worsening abdominal pain.  Patient had CT/ MRI/ US of the abdomen which showed chronic pancreatitis, gastric distention (for which he had an NGT),  intra/ extra hepatic biliary ductal dilatation, and fluid collection/  mass in the head of the pancreas compressing the SMV and proximal portal vein.    Upon admission, patient with significantly elevated ALK PHOS 1200.      Epigastric abdominal pain due to   Pancreatic mass,  associated with biliary and pancreatic duct dilatation , pancreatic stones with underlying chronic pancreatitis s/p ERCP, EUS s/p FNA  Partial gastric outlet obstruction due to severe duodenal inflammation s/p NGT now s/p GJ tube   Cholelithiasis and marked gallbladder distention without acute cholecystitis  Elevated CA 19-9   - 3/16/22 - s/p EUS and FNA of the pancreatic mass , ERCP, pancreatic sphincterotomy , s/p pancreatic stent , s/p GJ tube   - s/p GJ tube- Now on TFs with recreational PO feeds   - watch for refeeding syndrome; replace Mg/Phos PRN   - complete workup with tumor markers/CT chest/Heme-Onc Cx - results noted - no mets to chest/follow-up biopsy results     Hematemesis 3/15/22 Acute blood loss anemia due to upper GI bleeding   Iron deficiency   - s/p lovenox 40 3/14  - d/sherice   - Cont Protonix drip- change to IVP   - s/p  2 Units PRBC    Mild leukocytosis likely due to malignancy  LLL opacity suspected Pneumonia possible aspiration  On CT abd there was suspicion of  LLL PNA and was on ZOsyn  CT chest does not show any consolidation - therefore there is no pneumonia and will stop Zosyn  There is possible discitis on CT imaging and will get MRI to r/o DISCITIS/OM vs Metastatic lesion   Discussed with DR Lewis and started on Doxy for possible discitis - MRI T SPINE confirms this   Patient has a MIDLINE (he came with it)  and plan to  dc especially in light of IVDA - this fell off today 3/19    DM type2 with A1C 9.9 with hypoglycemic episode on 3/15   Stop TFs as patient unable to tolerate it - had emeses and then became hypoglycemia  So cont PO DIET, STOP TFs, STOP LANTUS until patient able to tolerate PO DIET consistently  use RISS only     Abnormal Weight Loss   Severe Protein Calorie Malnutrition/ Cachexia      Severe hypoalbuminemia  Hyponatremia 2/2 pain  DC IVFs place on fluid restriction   likely related to pain     IVDA/ Opioid Dependence  - Patient was on methadone 60 mg daily as per notes.    - palliative team consult - for pain management     DVT Proph:  venodynes - HEP SC     DISPO - on PO DIET ALONE, TFS and LANTUS STOPPED. H&H stable at this time, will start hep sc          55 y/o male with PMHX of IVDA with heroin , opioid dependence on methadone, IDDM, chronic pancreatitis, abnormal weight loss over the last 1 year who was a transfer from [facility] on 3/14/22  for possible ERCP.  Patient initially presented to outside hospital for worsening abdominal pain.  Patient had CT/ MRI/ US of the abdomen which showed chronic pancreatitis, gastric distention (for which he had an NGT),  intra/ extra hepatic biliary ductal dilatation, and fluid collection/  mass in the head of the pancreas compressing the SMV and proximal portal vein.    Upon admission, patient with significantly elevated ALK PHOS 1200.      Epigastric abdominal pain due to   Pancreatic mass,  associated with biliary and pancreatic duct dilatation , pancreatic stones with underlying chronic pancreatitis s/p ERCP, EUS s/p FNA  Partial gastric outlet obstruction due to severe duodenal inflammation s/p NGT now s/p GJ tube   Cholelithiasis and marked gallbladder distention without acute cholecystitis  Elevated CA 19-9   - 3/16/22 - s/p EUS and FNA of the pancreatic mass , ERCP, pancreatic sphincterotomy , s/p pancreatic stent , s/p GJ tube   - s/p GJ tube- Now on TFs with recreational PO feeds   - watch for refeeding syndrome; replace Mg/Phos PRN   - complete workup with tumor markers/CT chest/Heme-Onc Cx - results noted - no mets to chest/follow-up biopsy results     Hematemesis 3/15/22 Acute blood loss anemia due to upper GI bleeding   Iron deficiency   - s/p lovenox 40 3/14  - d/sherice   - Cont Protonix drip- change to IVP   - s/p  2 Units PRBC    Mild leukocytosis likely due to malignancy  LLL opacity suspected Pneumonia possible aspiration  On CT abd there was suspicion of  LLL PNA and was on ZOsyn  CT chest does not show any consolidation - therefore there is no pneumonia and will stop Zosyn  There is possible discitis on CT imaging and will get MRI to r/o DISCITIS/OM vs Metastatic lesion   Discussed with DR Lewis and started on Doxy for possible discitis - MRI T SPINE confirms this   Patient has a MIDLINE (he came with it)  and plan to  dc especially in light of IVDA - this fell off today 3/19    DM type2 with A1C 9.9 with hypoglycemic episode on 3/15   Stop TFs as patient unable to tolerate it - had emeses and then became hypoglycemia  So cont PO DIET, STOP TFs, STOP LANTUS until patient able to tolerate PO DIET consistently  use RISS only     Abnormal Weight Loss   Severe Protein Calorie Malnutrition/ Cachexia      Severe hypoalbuminemia  Hyponatremia 2/2 pain  DC IVFs place on fluid restriction   likely related to pain     IVDA/ Opioid Dependence  - Patient was on methadone 60 mg daily as per notes.    - palliative team consult - for pain management     DVT Proph:  resume LOVENOX 40; monitor CBC closely     DISPO - on PO DIET ALONE, TFS and LANTUS STOPPED. MRI spine with OM, midline fell off

## 2022-03-19 NOTE — PROGRESS NOTE ADULT - SUBJECTIVE AND OBJECTIVE BOX
57 y/o male with PMHX of IVDA with heroin(  last used 2 months ago ) , opioid dependence on methadone, IDDM, chronic pancreatitis, and recent significant weight loss over the last 1 year who was a transfer from [facility] on 3/14/22  for possible ERCP.  Patient initially presented to outside hospital for worsening abdominal pain.  Patient had CT/ MRI/ US of the abdomen which showed chronic pancreatitis, gastric distention (for which he had an NGT),  intra/ extra hepatic biliary ductal dilatation, and fluid collection/ ? mass in the head of the pancreas compressing the SMV and proximal portal vein.  Patient was transferred to  for possible need for ERCP and further GI evaluation.  Upon admission, patient with significantly elevated ALKPHOS 1200.      Hospital course :   3/15/22- events of last night noted. Had coffee-ground emesis and started on Protonix drip. NGT to suction. NPO  3/16 - pt seen and examined, + NGT in place, reports severe abdominal pain, +constipation, denies cp, dyspnea, afebrile  3/17 - no epigastric/low chest pain, restrosternal earlier and trops negative; no further cp palps sob; abdo pain fluctuating   3/18 - no cp palps sob - has back pain and admit to IVDA but states it was more than 2 mos ago, patient ate lunch and has TFs as well   3/19 - no cp palps sob - some abdo distress - he did not tolerate the TFs and he had two episodes of vomiting - TFs now dced - hes eating po     PHYSICAL EXAM:  Vital Signs Last 24 Hrs  T(F): 98.6 (19 Mar 2022 16:04), Max: 98.6 (19 Mar 2022 16:04)  HR: 81 (19 Mar 2022 16:04) (81 - 86)  BP: 156/86 (19 Mar 2022 16:04) (137/73 - 162/82)  RR: 18 (19 Mar 2022 16:04) (18 - 18)  SpO2: 100% (19 Mar 2022 16:04) (99% - 100%)  GENERAL: cachectic,  NAD  HEAD:  Atraumatic, Normocephalic  EYES: EOMI, PERRLA, conjunctiva and sclera clear  HEENT: NGT+with black discharge   NECK: Supple, No JVD  NERVOUS SYSTEM:  Alert & Oriented X2, moves all extremities  CHEST/LUNG: Clear to auscultation bilaterally; No rales, rhonchi, wheezing, or rubs  HEART: Regular rate and rhythm; No murmurs, rubs, or gallops  ABDOMEN: Soft, fullness, +BS, GJ tube present   GENITOURINARY- Voiding, no palpable bladder  EXTREMITIES:  2+ Peripheral Pulses, No clubbing, cyanosis, or edema  MUSCULOSKELETAL No muscle tenderness, Muscle tone normal, No joint tenderness, no Joint swelling, Joint range of motion-normal    RADIOLOGY & ADDITIONAL TESTS:  MR MRCP w/wo IV Cont (03.15.22 @ 15:44) >  Pancreatic head mass suspicious for neoplasm with associated biliary and   pancreatic ductal dilatation. Recommend biopsy.  Upper abdominal lymphadenopathy  Distended stomach. Recommend clinical correlation for partial gastric   outlet obstruction    Xray Abdomen 1 View PORTABLE -Urgent (Xray Abdomen 1 View PORTABLE -Urgent .) (03.15.22 @ 10:53) >  Feeding tube in satisfactory position.  Mild to moderate pulmonary venous congestion/perihilar interstitial   infiltrates, new    CT ABDOMEN AND PELVIS IC                        PROCEDURE DATE:  03/14/2022    LOWER CHEST: Mild patchy groundglass opacity in the left lower lobe.  LIVER: Within normal limits.  BILE DUCTS/PANCREAS:  Moderate intrahepatic and hepatic biliary ductal dilatation. The common   duct measures 15 mm. There is abrupt cut off of the duct in its   intrapancreatic portion.*Pancreatic duct is markedly dilated, measuring   11 mm, with abrupt cutoff in the neck due to multiple intraductal calculi.  Additional coarse pancreatic calcifications are identified compatible   with chronic pancreatitis.  *No definite findings to suggest acute pancreatitis, however evaluation   is limited due to paucity of peripancreatic fat.  *Cystic lesions in the neck and head measuring up to 2.7 cm.  GALLBLADDER: Markedly distended and containing multiple gallstones.  IMPRESSION:  Chronic pancreatitis.  Pancreatic ductal dilatation due to multiple intraductal stones.  Biliary ductal dilatation the exact etiology which could be due to post   pancreatic stricture. Consider further evaluation with MRI or endoscopic   ultrasound as an occult pancreatic mass is not excluded.  Cystic pancreatic lesions most likely representing pseudocysts.  Cholelithiasis and marked gallbladder distention, without evidence of   acute cholecystitis.  Mild left lower lobe opacity suspicious for pneumonia.  Enteric tube with tip in the distal esophagus.    < from: MR Thoracic Spine w/wo IV Cont (03.19.22 @ 12:36) >  IMPRESSION:  Acute osteomyelitis/discitis identified at T6-7 with   enhancement and increased signal within the inferior T6 and superior T7   endplates and within the intervening disc. Enhancement in the   paravertebral soft tissues at this level and ventral epidural space is   noted, LEFT greater than RIGHT.      LABS: All Labs Reviewed:                      10.8   14.08 )-----------( [id number]      ( 19 Mar 2022 05:53 )             33.1   131<L>  |  98  |  14  ----------------------------<  40<LL>  3.8   |  28  |  0.35<L>  TPro  6.4  /  Alb  1.4<L>  /  TBili  0.8  /  DBili  x   /  AST  80<H>  /  ALT  51  /  AlkPhos  1238<H>  03-18    MEDS:   acetaminophen     Tablet .. 650 milliGRAM(s) Oral every 6 hours PRN  ALBUTerol    90 MICROgram(s) HFA Inhaler 2 Puff(s) Inhalation every 6 hours PRN  aluminum hydroxide/magnesium hydroxide/simethicone Suspension 30 milliLiter(s) Oral every 4 hours PRN  bisacodyl Suppository 10 milliGRAM(s) Rectal <User Schedule>  doxycycline hyclate Capsule 100 milliGRAM(s) Oral every 12 hours  enoxaparin Injectable 40 milliGRAM(s) SubCutaneous every 24 hours  folic acid 1 milliGRAM(s) Oral daily  insulin glargine Injectable (LANTUS) 15 Unit(s) SubCutaneous every morning  insulin lispro (ADMELOG) corrective regimen sliding scale   SubCutaneous every 6 hours  melatonin 3 milliGRAM(s) Oral at bedtime PRN  methadone    Tablet 60 milliGRAM(s) Oral daily  morphine   Solution 15 milliGRAM(s) Oral every 4 hours PRN  morphine   Solution 20 milliGRAM(s) Oral every 4 hours PRN  multivitamin 1 Tablet(s) Oral daily  ondansetron Injectable 4 milliGRAM(s) IV Push every 8 hours PRN  pancrelipase (ZENPEP 20,000 Lipase Units) 1 Capsule(s) Oral three times a day with meals  pantoprazole  Injectable 40 milliGRAM(s) IV Push every 12 hours  thiamine 100 milliGRAM(s) Oral daily  zolpidem 5 milliGRAM(s) Oral at bedtime PRN                       55 y/o male with PMHX of IVDA with heroin(  last used 2 months ago ) , opioid dependence on methadone, IDDM, chronic pancreatitis, and recent significant weight loss over the last 1 year who was a transfer from [facility] on 3/14/22  for possible ERCP.  Patient initially presented to outside hospital for worsening abdominal pain.  Patient had CT/ MRI/ US of the abdomen which showed chronic pancreatitis, gastric distention (for which he had an NGT),  intra/ extra hepatic biliary ductal dilatation, and fluid collection/ ? mass in the head of the pancreas compressing the SMV and proximal portal vein.  Patient was transferred to  for possible need for ERCP and further GI evaluation.  Upon admission, patient with significantly elevated ALKPHOS 1200.      Hospital course :   3/15/22- events of last night noted. Had coffee-ground emesis and started on Protonix drip. NGT to suction. NPO  3/16 - pt seen and examined, + NGT in place, reports severe abdominal pain, +constipation, denies cp, dyspnea, afebrile  3/17 - no epigastric/low chest pain, restrosternal earlier and trops negative; no further cp palps sob; abdo pain fluctuating   3/18 - no cp palps sob - has back pain and admit to IVDA but states it was more than 2 mos ago, patient ate lunch and has TFs as well   3/19 - no cp palps sob - some abdo distress - he did not tolerate the TFs and he had two episodes of vomiting - TFs now dced - hes eating po     PHYSICAL EXAM:  Vital Signs Last 24 Hrs  T(F): 98.6 (19 Mar 2022 16:04), Max: 98.6 (19 Mar 2022 16:04)  HR: 81 (19 Mar 2022 16:04) (81 - 86)  BP: 156/86 (19 Mar 2022 16:04) (137/73 - 162/82)  RR: 18 (19 Mar 2022 16:04) (18 - 18)  SpO2: 100% (19 Mar 2022 16:04) (99% - 100%)  GENERAL: cachectic,  NAD  HEAD:  Atraumatic, Normocephalic  EYES: EOMI, PERRLA, conjunctiva and sclera clear  NECK: Supple, No JVD  NERVOUS SYSTEM:  Alert & Oriented X2, moves all extremities  CHEST/LUNG: Clear to auscultation bilaterally; No rales, rhonchi, wheezing, or rubs  HEART: Regular rate and rhythm; No murmurs, rubs, or gallops  ABDOMEN: Soft, fullness, +BS, GJ tube present   GENITOURINARY- Voiding, no palpable bladder  EXTREMITIES:  2+ Peripheral Pulses, No clubbing, cyanosis, or edema  MUSCULOSKELETAL No muscle tenderness, Muscle tone normal, No joint tenderness, no Joint swelling, Joint range of motion-normal    RADIOLOGY & ADDITIONAL TESTS:  MR MRCP w/wo IV Cont (03.15.22 @ 15:44) >  Pancreatic head mass suspicious for neoplasm with associated biliary and   pancreatic ductal dilatation. Recommend biopsy.  Upper abdominal lymphadenopathy  Distended stomach. Recommend clinical correlation for partial gastric   outlet obstruction    Xray Abdomen 1 View PORTABLE -Urgent (Xray Abdomen 1 View PORTABLE -Urgent .) (03.15.22 @ 10:53) >  Feeding tube in satisfactory position.  Mild to moderate pulmonary venous congestion/perihilar interstitial   infiltrates, new    CT ABDOMEN AND PELVIS IC                        PROCEDURE DATE:  03/14/2022    LOWER CHEST: Mild patchy groundglass opacity in the left lower lobe.  LIVER: Within normal limits.  BILE DUCTS/PANCREAS:  Moderate intrahepatic and hepatic biliary ductal dilatation. The common   duct measures 15 mm. There is abrupt cut off of the duct in its   intrapancreatic portion.*Pancreatic duct is markedly dilated, measuring   11 mm, with abrupt cutoff in the neck due to multiple intraductal calculi.  Additional coarse pancreatic calcifications are identified compatible   with chronic pancreatitis.  *No definite findings to suggest acute pancreatitis, however evaluation   is limited due to paucity of peripancreatic fat.  *Cystic lesions in the neck and head measuring up to 2.7 cm.  GALLBLADDER: Markedly distended and containing multiple gallstones.  IMPRESSION:  Chronic pancreatitis.  Pancreatic ductal dilatation due to multiple intraductal stones.  Biliary ductal dilatation the exact etiology which could be due to post   pancreatic stricture. Consider further evaluation with MRI or endoscopic   ultrasound as an occult pancreatic mass is not excluded.  Cystic pancreatic lesions most likely representing pseudocysts.  Cholelithiasis and marked gallbladder distention, without evidence of   acute cholecystitis.  Mild left lower lobe opacity suspicious for pneumonia.  Enteric tube with tip in the distal esophagus.    < from: MR Thoracic Spine w/wo IV Cont (03.19.22 @ 12:36) >  IMPRESSION:  Acute osteomyelitis/discitis identified at T6-7 with   enhancement and increased signal within the inferior T6 and superior T7   endplates and within the intervening disc. Enhancement in the   paravertebral soft tissues at this level and ventral epidural space is   noted, LEFT greater than RIGHT.      LABS: All Labs Reviewed:                      10.8   14.08 )-----------( [id number]      ( 19 Mar 2022 05:53 )             33.1   131<L>  |  98  |  14  ----------------------------<  40<LL>  3.8   |  28  |  0.35<L>  TPro  6.4  /  Alb  1.4<L>  /  TBili  0.8  /  DBili  x   /  AST  80<H>  /  ALT  51  /  AlkPhos  1238<H>  03-18    MEDS:   acetaminophen     Tablet .. 650 milliGRAM(s) Oral every 6 hours PRN  ALBUTerol    90 MICROgram(s) HFA Inhaler 2 Puff(s) Inhalation every 6 hours PRN  aluminum hydroxide/magnesium hydroxide/simethicone Suspension 30 milliLiter(s) Oral every 4 hours PRN  bisacodyl Suppository 10 milliGRAM(s) Rectal <User Schedule>  doxycycline hyclate Capsule 100 milliGRAM(s) Oral every 12 hours  enoxaparin Injectable 40 milliGRAM(s) SubCutaneous every 24 hours  folic acid 1 milliGRAM(s) Oral daily  insulin glargine Injectable (LANTUS) 15 Unit(s) SubCutaneous every morning  insulin lispro (ADMELOG) corrective regimen sliding scale   SubCutaneous every 6 hours  melatonin 3 milliGRAM(s) Oral at bedtime PRN  methadone    Tablet 60 milliGRAM(s) Oral daily  morphine   Solution 15 milliGRAM(s) Oral every 4 hours PRN  morphine   Solution 20 milliGRAM(s) Oral every 4 hours PRN  multivitamin 1 Tablet(s) Oral daily  ondansetron Injectable 4 milliGRAM(s) IV Push every 8 hours PRN  pancrelipase (ZENPEP 20,000 Lipase Units) 1 Capsule(s) Oral three times a day with meals  pantoprazole  Injectable 40 milliGRAM(s) IV Push every 12 hours  thiamine 100 milliGRAM(s) Oral daily  zolpidem 5 milliGRAM(s) Oral at bedtime PRN

## 2022-03-20 LAB
ANION GAP SERPL CALC-SCNC: 4 MMOL/L — LOW (ref 5–17)
BUN SERPL-MCNC: 16 MG/DL — SIGNIFICANT CHANGE UP (ref 7–23)
CALCIUM SERPL-MCNC: 8.7 MG/DL — SIGNIFICANT CHANGE UP (ref 8.5–10.1)
CHLORIDE SERPL-SCNC: 96 MMOL/L — SIGNIFICANT CHANGE UP (ref 96–108)
CO2 SERPL-SCNC: 30 MMOL/L — SIGNIFICANT CHANGE UP (ref 22–31)
CREAT SERPL-MCNC: 0.47 MG/DL — LOW (ref 0.5–1.3)
EGFR: 123 ML/MIN/1.73M2 — SIGNIFICANT CHANGE UP
GLUCOSE SERPL-MCNC: 255 MG/DL — HIGH (ref 70–99)
HCT VFR BLD CALC: 33.2 % — LOW (ref 39–50)
HGB BLD-MCNC: 10.6 G/DL — LOW (ref 13–17)
MAGNESIUM SERPL-MCNC: 1.8 MG/DL — SIGNIFICANT CHANGE UP (ref 1.6–2.6)
MCHC RBC-ENTMCNC: 26.3 PG — LOW (ref 27–34)
MCHC RBC-ENTMCNC: 31.9 GM/DL — LOW (ref 32–36)
MCV RBC AUTO: 82.4 FL — SIGNIFICANT CHANGE UP (ref 80–100)
PHOSPHATE SERPL-MCNC: 2.1 MG/DL — LOW (ref 2.5–4.5)
PLATELET # BLD AUTO: 442 K/UL — HIGH (ref 150–400)
POTASSIUM SERPL-MCNC: 5 MMOL/L — SIGNIFICANT CHANGE UP (ref 3.5–5.3)
POTASSIUM SERPL-SCNC: 5 MMOL/L — SIGNIFICANT CHANGE UP (ref 3.5–5.3)
RBC # BLD: 4.03 M/UL — LOW (ref 4.2–5.8)
RBC # FLD: 15.6 % — HIGH (ref 10.3–14.5)
SODIUM SERPL-SCNC: 130 MMOL/L — LOW (ref 135–145)
WBC # BLD: 10.8 K/UL — HIGH (ref 3.8–10.5)
WBC # FLD AUTO: 10.8 K/UL — HIGH (ref 3.8–10.5)

## 2022-03-20 PROCEDURE — 99232 SBSQ HOSP IP/OBS MODERATE 35: CPT

## 2022-03-20 RX ADMIN — MORPHINE SULFATE 20 MILLIGRAM(S): 50 CAPSULE, EXTENDED RELEASE ORAL at 00:45

## 2022-03-20 RX ADMIN — MORPHINE SULFATE 20 MILLIGRAM(S): 50 CAPSULE, EXTENDED RELEASE ORAL at 17:59

## 2022-03-20 RX ADMIN — MORPHINE SULFATE 20 MILLIGRAM(S): 50 CAPSULE, EXTENDED RELEASE ORAL at 21:35

## 2022-03-20 RX ADMIN — Medication 2: at 18:27

## 2022-03-20 RX ADMIN — Medication 4: at 22:29

## 2022-03-20 RX ADMIN — Medication 100 MILLIGRAM(S): at 22:29

## 2022-03-20 RX ADMIN — PANTOPRAZOLE SODIUM 40 MILLIGRAM(S): 20 TABLET, DELAYED RELEASE ORAL at 22:29

## 2022-03-20 RX ADMIN — Medication 1 CAPSULE(S): at 08:37

## 2022-03-20 RX ADMIN — Medication 100 MILLIGRAM(S): at 09:44

## 2022-03-20 RX ADMIN — PANTOPRAZOLE SODIUM 40 MILLIGRAM(S): 20 TABLET, DELAYED RELEASE ORAL at 09:45

## 2022-03-20 RX ADMIN — ENOXAPARIN SODIUM 40 MILLIGRAM(S): 100 INJECTION SUBCUTANEOUS at 14:52

## 2022-03-20 RX ADMIN — METHADONE HYDROCHLORIDE 60 MILLIGRAM(S): 40 TABLET ORAL at 09:43

## 2022-03-20 RX ADMIN — MORPHINE SULFATE 20 MILLIGRAM(S): 50 CAPSULE, EXTENDED RELEASE ORAL at 03:41

## 2022-03-20 RX ADMIN — Medication 1 TABLET(S): at 09:45

## 2022-03-20 RX ADMIN — MORPHINE SULFATE 20 MILLIGRAM(S): 50 CAPSULE, EXTENDED RELEASE ORAL at 07:41

## 2022-03-20 RX ADMIN — Medication 4: at 08:33

## 2022-03-20 RX ADMIN — Medication 3 MILLIGRAM(S): at 22:29

## 2022-03-20 RX ADMIN — Medication 2: at 14:51

## 2022-03-20 RX ADMIN — Medication 1 MILLIGRAM(S): at 09:45

## 2022-03-20 RX ADMIN — Medication 1 CAPSULE(S): at 14:51

## 2022-03-20 RX ADMIN — MORPHINE SULFATE 20 MILLIGRAM(S): 50 CAPSULE, EXTENDED RELEASE ORAL at 17:29

## 2022-03-20 RX ADMIN — MORPHINE SULFATE 20 MILLIGRAM(S): 50 CAPSULE, EXTENDED RELEASE ORAL at 04:50

## 2022-03-20 RX ADMIN — MORPHINE SULFATE 20 MILLIGRAM(S): 50 CAPSULE, EXTENDED RELEASE ORAL at 22:15

## 2022-03-20 NOTE — PROGRESS NOTE ADULT - SUBJECTIVE AND OBJECTIVE BOX
57 y/o male with PMHX of IVDA with heroin(  last used 2 months ago ) , opioid dependence on methadone, IDDM, chronic pancreatitis, and recent significant weight loss over the last 1 year who was a transfer from [facility] on 3/14/22  for possible ERCP.  Patient initially presented to outside hospital for worsening abdominal pain.  Patient had CT/ MRI/ US of the abdomen which showed chronic pancreatitis, gastric distention (for which he had an NGT),  intra/ extra hepatic biliary ductal dilatation, and fluid collection/ ? mass in the head of the pancreas compressing the SMV and proximal portal vein.  Patient was transferred to  for possible need for ERCP and further GI evaluation.  Upon admission, patient with significantly elevated ALKPHOS 1200.      Hospital course :   3/15/22- events of last night noted. Had coffee-ground emesis and started on Protonix drip. NGT to suction. NPO  3/16 - pt seen and examined, + NGT in place, reports severe abdominal pain, +constipation, denies cp, dyspnea, afebrile  3/17 - no epigastric/low chest pain, restrosternal earlier and trops negative; no further cp palps sob; abdo pain fluctuating   3/18 - no cp palps sob - has back pain and admit to IVDA but states it was more than 2 mos ago, patient ate lunch and has TFs as well   3/19 - no cp palps sob - some abdo distress - he did not tolerate the TFs and he had two episodes of vomiting - TFs now dced - hes eating po   3/20 - no cp palps sob - no n/v now, still with some abdo discomfort;  to resume Tfs today    PHYSICAL EXAM:  Vital Signs Last 24 Hrs  T(F): 97.6 (20 Mar 2022 16:55), Max: 98.5 (19 Mar 2022 20:07)  HR: 81 (20 Mar 2022 16:55) (81 - 84)  BP: 136/78 (20 Mar 2022 16:55) (136/78 - 159/86)  RR: 17 (20 Mar 2022 16:55) (16 - 17)  SpO2: 98% (20 Mar 2022 16:55) (98% - 100%)  GENERAL: cachectic,  NAD  HEAD:  Atraumatic, Normocephalic  EYES: EOMI, PERRLA, conjunctiva and sclera clear  NECK: Supple, No JVD  NERVOUS SYSTEM:  Alert & Oriented X2, moves all extremities  CHEST/LUNG: Clear to auscultation bilaterally; No rales, rhonchi, wheezing, or rubs  HEART: Regular rate and rhythm; No murmurs, rubs, or gallops  ABDOMEN: Soft, fullness, +BS, GJ tube present   GENITOURINARY- Voiding, no palpable bladder  EXTREMITIES:  2+ Peripheral Pulses, No clubbing, cyanosis, or edema  MUSCULOSKELETAL No muscle tenderness, Muscle tone normal, No joint tenderness, no Joint swelling, Joint range of motion-normal    RADIOLOGY & ADDITIONAL TESTS:  MR MRCP w/wo IV Cont (03.15.22 @ 15:44) >  Pancreatic head mass suspicious for neoplasm with associated biliary and   pancreatic ductal dilatation. Recommend biopsy.  Upper abdominal lymphadenopathy  Distended stomach. Recommend clinical correlation for partial gastric   outlet obstruction    Xray Abdomen 1 View PORTABLE -Urgent (Xray Abdomen 1 View PORTABLE -Urgent .) (03.15.22 @ 10:53) >  Feeding tube in satisfactory position.  Mild to moderate pulmonary venous congestion/perihilar interstitial   infiltrates, new    CT ABDOMEN AND PELVIS IC                        PROCEDURE DATE:  03/14/2022    LOWER CHEST: Mild patchy groundglass opacity in the left lower lobe.  LIVER: Within normal limits.  BILE DUCTS/PANCREAS:  Moderate intrahepatic and hepatic biliary ductal dilatation. The common   duct measures 15 mm. There is abrupt cut off of the duct in its   intrapancreatic portion.*Pancreatic duct is markedly dilated, measuring   11 mm, with abrupt cutoff in the neck due to multiple intraductal calculi.  Additional coarse pancreatic calcifications are identified compatible   with chronic pancreatitis.  *No definite findings to suggest acute pancreatitis, however evaluation   is limited due to paucity of peripancreatic fat.  *Cystic lesions in the neck and head measuring up to 2.7 cm.  GALLBLADDER: Markedly distended and containing multiple gallstones.  IMPRESSION:  Chronic pancreatitis.  Pancreatic ductal dilatation due to multiple intraductal stones.  Biliary ductal dilatation the exact etiology which could be due to post   pancreatic stricture. Consider further evaluation with MRI or endoscopic   ultrasound as an occult pancreatic mass is not excluded.  Cystic pancreatic lesions most likely representing pseudocysts.  Cholelithiasis and marked gallbladder distention, without evidence of   acute cholecystitis.  Mild left lower lobe opacity suspicious for pneumonia.  Enteric tube with tip in the distal esophagus.    < from: MR Thoracic Spine w/wo IV Cont (03.19.22 @ 12:36) >  IMPRESSION:  Acute osteomyelitis/discitis identified at T6-7 with   enhancement and increased signal within the inferior T6 and superior T7   endplates and within the intervening disc. Enhancement in the   paravertebral soft tissues at this level and ventral epidural space is   noted, LEFT greater than RIGHT.    LABS: All Labs Reviewed:                        10.6   10.80 )-----------( [id number]      ( 20 Mar 2022 07:25 )             33.2     130<L>  |  96  |  16  ----------------------------<  255<H>  5.0   |  30  |  0.47<L>    Ca    8.7      20 Mar 2022 07:25  Phos  2.1     03-20    MEDS:   acetaminophen     Tablet .. 650 milliGRAM(s) Oral every 6 hours PRN  ALBUTerol    90 MICROgram(s) HFA Inhaler 2 Puff(s) Inhalation every 6 hours PRN  aluminum hydroxide/magnesium hydroxide/simethicone Suspension 30 milliLiter(s) Oral every 4 hours PRN  bisacodyl Suppository 10 milliGRAM(s) Rectal <User Schedule>  dextrose 40% Gel 15 Gram(s) Oral once  dextrose 5%. 1000 milliLiter(s) IV Continuous <Continuous>  dextrose 5%. 1000 milliLiter(s) IV Continuous <Continuous>  dextrose 50% Injectable 25 Gram(s) IV Push once  dextrose 50% Injectable 12.5 Gram(s) IV Push once  dextrose 50% Injectable 25 Gram(s) IV Push once  doxycycline hyclate Capsule 100 milliGRAM(s) Oral every 12 hours  enoxaparin Injectable 40 milliGRAM(s) SubCutaneous every 24 hours  folic acid 1 milliGRAM(s) Oral daily  glucagon  Injectable 1 milliGRAM(s) IntraMuscular once  insulin lispro (ADMELOG) corrective regimen sliding scale   SubCutaneous every 6 hours  melatonin 3 milliGRAM(s) Oral at bedtime PRN  methadone    Tablet 60 milliGRAM(s) Oral daily  morphine   Solution 20 milliGRAM(s) Oral every 4 hours PRN  morphine   Solution 15 milliGRAM(s) Oral every 4 hours PRN  multivitamin 1 Tablet(s) Oral daily  ondansetron Injectable 4 milliGRAM(s) IV Push every 8 hours PRN  pancrelipase (ZENPEP 20,000 Lipase Units) 1 Capsule(s) Oral three times a day with meals  pantoprazole  Injectable 40 milliGRAM(s) IV Push every 12 hours  thiamine 100 milliGRAM(s) Oral daily  zolpidem 5 milliGRAM(s) Oral at bedtime PRN

## 2022-03-20 NOTE — PROGRESS NOTE ADULT - ASSESSMENT
56 year old man transferred here for failure to thrive and severe chronic pancreatitis from polysubstance abuse, now improving.     Patient tolerating his diet.   Likely benign pancreatic disease, severe chronic pancreatitis.   GJ tube should be used since he is immaciated despite being able to eat. Spoke to nutrition, we will resume J tube feeds.

## 2022-03-20 NOTE — CHART NOTE - NSCHARTNOTEFT_GEN_A_CORE
Clinical Nutrition BRIEF NOTE    *56 year old male admitted for acute pancreatitis without infection or necrosis. Pt w/ PMH IVDA w/ heroin, opioid dependence (on methadone), IDDM, chronic pancreatitis w/ possible mass on abdomen, recent significant wt loss x 1 year. Transfer from [facility] - admitted for worsening abdominal pain.     *s/p ERCP and G-J tube placement on 3/16/22 with start of TF on 3/17/22.     *per flowsheet, pt consumed 100% of breakfast, 75% of lunch, and 90% of dinner (on 3/18).  Which provided ~1136Kcal and 67g protein; meeting ~83% of estimated calorie needs and ~96% of estimated protein needs.   *per flowsheet and pt, pt consumed 100% of breakfast, 50% of lunch, and 50% of dinner (on 3/19).  Which provided ~737Kcal and 25g protein; meeting ~54% of estimated calorie needs and ~36% of protein needs.  *on average, pt is meeting ~65% of calorie/protein needs.  d/w MD eLmon, will resume TF at night to boost patient's nutr status for pending procedure.  *rec'd start TF at 6pm (18:00) and stop at 6am.  Glucerna 1.5 with goal rate of 40mL/hr.  Which will provide ~720Kcal, 40g protein, 364mL free water, and total TF volume of 480mL.    Estimated Needs: based on 39Kg (wt from bed scale wt obtained by RD on 3/15)  Calories: 0212-3353 Kcal (35-40 Kcal/Kg)  Protein: 70.2-78 g (1.8-2.0 g/Kg)  Fluids: 3975-3872 mL (35-40 mL/Kg)    RECOMMENDATIONS:  1) change diet Rx to Glucerna 1.5 @ 40mL/hr from 1800 to 6am with PO diet of regular with glucerna BID to optimize PO intake.  2) monitor TF tolerance, keep back of bed >35 degrees  3) monitor hydration status; adjust free water flushes prn; consider free water flushes of 25mL q1hr from 1800 to 6am (=300mL additional free water daily).  4) daily wt checks  5) monitor lytes/mins; replete/correct prn     *will monitor and adjust treatement plan prn*  Angela Courtney MA, RDN, CDN, [facility] [phone] Clinical Nutrition BRIEF NOTE    *56 year old male admitted for acute pancreatitis without infection or necrosis. Pt w/ PMH IVDA w/ heroin, opioid dependence (on methadone), IDDM, chronic pancreatitis w/ possible mass on abdomen, recent significant wt loss x 1 year. Transfer from [facility] - admitted for worsening abdominal pain.     *s/p ERCP and G-J tube placement on 3/16/22 with start of TF on 3/17/22.     *per flowsheet, pt consumed 100% of breakfast, 75% of lunch, and 90% of dinner (on 3/18).  Which provided ~1136Kcal and 67g protein; meeting ~83% of estimated calorie needs and ~96% of estimated protein needs.   *per flowsheet and pt, pt consumed 100% of breakfast, 50% of lunch, and 50% of dinner (on 3/19).  Which provided ~737Kcal and 25g protein; meeting ~54% of estimated calorie needs and ~36% of protein needs.  *on average, pt is meeting ~65% of calorie/protein needs.  d/w MD Lemon, will resume TF at night to boost patient's nutr status for pending procedure.  *rec'd start TF at 6pm (18:00) and stop at 6am.  Glucerna 1.5 with goal rate of 40mL/hr.  Which will provide ~720Kcal, 40g protein, 364mL free water, and total TF volume of 480mL.    Estimated Needs: based on 39Kg (wt from bed scale wt obtained by RD on 3/15)  Calories: 6569-7325 Kcal (35-40 Kcal/Kg)  Protein: 70.2-78 g (1.8-2.0 g/Kg)  Fluids: 9178-1232 mL (35-40 mL/Kg)    Wt Hx:  38.8Kg (3/20)  38.9Kg (3/15)    RECOMMENDATIONS:  1) change diet Rx to Glucerna 1.5 @ 40mL/hr from 1800 to 6am with PO diet of regular with glucerna BID to optimize PO intake.  2) monitor TF tolerance, keep back of bed >35 degrees  3) monitor hydration status; adjust free water flushes prn; consider free water flushes of 25mL q1hr from 1800 to 6am (=300mL additional free water daily).  4) daily wt checks  5) monitor lytes/mins; replete/correct prn     *will monitor and adjust treatement plan prn*  Angela Courtney MA, RDN, CDN, [facility] [phone]

## 2022-03-20 NOTE — PROGRESS NOTE ADULT - SUBJECTIVE AND OBJECTIVE BOX
Patient is a 56y old  Male who presents with a chief complaint of abdominal pain-- transfer for possible ERCP (19 Mar 2022 18:09). Follow up for abdominal pain, nutritional status.     This AM paitent is without any acute complaints. Tolerated his full diet for breakfast, was even asking for more.       MEDICATIONS  (STANDING):  bisacodyl Suppository 10 milliGRAM(s) Rectal <User Schedule>  dextrose 40% Gel 15 Gram(s) Oral once  dextrose 5%. 1000 milliLiter(s) (50 mL/Hr) IV Continuous <Continuous>  dextrose 5%. 1000 milliLiter(s) (100 mL/Hr) IV Continuous <Continuous>  dextrose 50% Injectable 25 Gram(s) IV Push once  dextrose 50% Injectable 12.5 Gram(s) IV Push once  dextrose 50% Injectable 25 Gram(s) IV Push once  doxycycline hyclate Capsule 100 milliGRAM(s) Oral every 12 hours  enoxaparin Injectable 40 milliGRAM(s) SubCutaneous every 24 hours  folic acid 1 milliGRAM(s) Oral daily  glucagon  Injectable 1 milliGRAM(s) IntraMuscular once  insulin lispro (ADMELOG) corrective regimen sliding scale   SubCutaneous every 6 hours  methadone    Tablet 60 milliGRAM(s) Oral daily  multivitamin 1 Tablet(s) Oral daily  pancrelipase (ZENPEP 20,000 Lipase Units) 1 Capsule(s) Oral three times a day with meals  pantoprazole  Injectable 40 milliGRAM(s) IV Push every 12 hours  thiamine 100 milliGRAM(s) Oral daily    MEDICATIONS  (PRN):  acetaminophen     Tablet .. 650 milliGRAM(s) Oral every 6 hours PRN Temp greater or equal to 38C (100.4F), Mild Pain (1 - 3)  ALBUTerol    90 MICROgram(s) HFA Inhaler 2 Puff(s) Inhalation every 6 hours PRN Bronchospasm  aluminum hydroxide/magnesium hydroxide/simethicone Suspension 30 milliLiter(s) Oral every 4 hours PRN Dyspepsia  melatonin 3 milliGRAM(s) Oral at bedtime PRN Insomnia  morphine   Solution 15 milliGRAM(s) Oral every 4 hours PRN Moderate Pain (4 - 6)  morphine   Solution 20 milliGRAM(s) Oral every 4 hours PRN Severe Pain (7 - 10)  ondansetron Injectable 4 milliGRAM(s) IV Push every 8 hours PRN Nausea and/or Vomiting  zolpidem 5 milliGRAM(s) Oral at bedtime PRN Insomnia      Vital Signs Last 24 Hrs  T(C): 36.3 (20 Mar 2022 09:26), Max: 37 (19 Mar 2022 16:04)  T(F): 97.4 (20 Mar 2022 09:26), Max: 98.6 (19 Mar 2022 16:04)  HR: 81 (20 Mar 2022 09:26) (81 - 84)  BP: 144/83 (20 Mar 2022 09:26) (144/83 - 159/86)  BP(mean): --  RR: 17 (20 Mar 2022 09:26) (16 - 18)  SpO2: 100% (20 Mar 2022 09:26) (99% - 100%)    PHYSICAL EXAM:    Constitutional: No acute distress, chronically ill appearing but  non-toxic appearing  HEENT: un-masked, good phonation, not icteric  Neck: supple, no lymphadenopathy  Respiratory: clear to ascultation bilaterally anteriorly, no wheezing  Cardiovascular: S1 and S2, regular rate and rhythm, no murmurs rubs or gallops  Gastrointestinal: soft, non-tender, non-distended, +bowel sounds, no rebound or guarding, no surgical scars, +GJ tube, no feeds running  Extremities: No peripheral edema, no cyanosis or clubbing  Vascular: 2+ peripheral pulses, no venous stasis  Neurological: A/O x 3, no focal deficits, no asterixis  Psychiatric: Normal mood, normal affect  Skin: No rashes, not jaundiced    LABS:                        10.6   10.80 )-----------( 442      ( 20 Mar 2022 07:25 )             33.2     03-20    130<L>  |  96  |  16  ----------------------------<  255<H>  5.0   |  30  |  0.47<L>    Ca    8.7      20 Mar 2022 07:25  Phos  2.1     03-20  Mg     1.8     03-20            RADIOLOGY & ADDITIONAL STUDIES:

## 2022-03-20 NOTE — PROGRESS NOTE ADULT - ASSESSMENT
55 y/o male with PMHX of IVDA with heroin , opioid dependence on methadone, IDDM, chronic pancreatitis, abnormal weight loss over the last 1 year who was a transfer from [facility] ([place]) on 3/14/22  for possible ERCP.  Patient initially presented to outside hospital for worsening abdominal pain.  Patient had CT/ MRI/ US of the abdomen which showed chronic pancreatitis, gastric distention (for which he had an NGT),  intra/ extra hepatic biliary ductal dilatation, and fluid collection/  mass in the head of the pancreas compressing the SMV and proximal portal vein.    Upon admission, patient with significantly elevated ALK PHOS 1200.      Epigastric abdominal pain due to   Pancreatic mass,  associated with biliary and pancreatic duct dilatation , pancreatic stones with underlying chronic pancreatitis s/p ERCP, EUS s/p FNA  Partial gastric outlet obstruction due to severe duodenal inflammation s/p NGT now s/p GJ tube   Cholelithiasis and marked gallbladder distention without acute cholecystitis  Elevated CA 19-9   - 3/16/22 - s/p EUS and FNA of the pancreatic mass , ERCP, pancreatic sphincterotomy , s/p pancreatic stent , s/p GJ tube   - s/p GJ tube- resume TFs with recreational PO feeds   - watch for refeeding syndrome; replace Mg/Phos PRN   - complete workup with tumor markers/CT chest/Heme-Onc Cx - results noted - no mets to chest/follow-up biopsy results     Hematemesis 3/15/22 Acute blood loss anemia due to upper GI bleeding   Iron deficiency   - s/p lovenox 40 3/14  - d/sherice   - Cont Protonix drip- change to IVP   - s/p  2 Units PRBC    Mild leukocytosis likely due to malignancy  LLL opacity suspected Pneumonia possible aspiration  On CT abd there was suspicion of  LLL PNA and was on ZOsyn  CT chest does not show any consolidation - therefore there is no pneumonia and will stop Zosyn  There is possible discitis on CT imaging and will get MRI to r/o DISCITIS/OM vs Metastatic lesion   Discussed with DR Lewis and started on Doxy for possible discitis - MRI T SPINE confirms this   Patient has a MIDLINE (he came with it)  and plan to  dc especially in light of IVDA - this fell off today 3/19    DM type2 with A1C 9.9 with hypoglycemic episode on 3/15   Stop TFs as patient unable to tolerate it - had emeses and then became hypoglycemia  So cont PO DIET, STOP TFs, STOP LANTUS until patient able to tolerate PO DIET consistently  use RISS only     Abnormal Weight Loss   Severe Protein Calorie Malnutrition/ Cachexia      Severe hypoalbuminemia  Hyponatremia 2/2 pain  DC IVFs place on fluid restriction   likely related to pain     IVDA/ Opioid Dependence  - Patient was on methadone 60 mg daily as per notes.    - can get morphine po as needed   - palliative team consult - for pain management     DVT Proph:  resume LOVENOX 40; monitor CBC closely     DISPO - on PO and TFs; MRI spine with OM, midline fell off; place on lantus if patient tolerates diet

## 2022-03-21 LAB
ANION GAP SERPL CALC-SCNC: 4 MMOL/L — LOW (ref 5–17)
BUN SERPL-MCNC: 16 MG/DL — SIGNIFICANT CHANGE UP (ref 7–23)
CALCIUM SERPL-MCNC: 8.3 MG/DL — LOW (ref 8.5–10.1)
CHLORIDE SERPL-SCNC: 94 MMOL/L — LOW (ref 96–108)
CO2 SERPL-SCNC: 30 MMOL/L — SIGNIFICANT CHANGE UP (ref 22–31)
CREAT SERPL-MCNC: 0.47 MG/DL — LOW (ref 0.5–1.3)
EGFR: 123 ML/MIN/1.73M2 — SIGNIFICANT CHANGE UP
GLUCOSE SERPL-MCNC: 247 MG/DL — HIGH (ref 70–99)
HCT VFR BLD CALC: 30.4 % — LOW (ref 39–50)
HGB BLD-MCNC: 9.5 G/DL — LOW (ref 13–17)
MAGNESIUM SERPL-MCNC: 1.6 MG/DL — SIGNIFICANT CHANGE UP (ref 1.6–2.6)
MCHC RBC-ENTMCNC: 26.8 PG — LOW (ref 27–34)
MCHC RBC-ENTMCNC: 31.3 GM/DL — LOW (ref 32–36)
MCV RBC AUTO: 85.6 FL — SIGNIFICANT CHANGE UP (ref 80–100)
PLATELET # BLD AUTO: 432 K/UL — HIGH (ref 150–400)
POTASSIUM SERPL-MCNC: 4.5 MMOL/L — SIGNIFICANT CHANGE UP (ref 3.5–5.3)
POTASSIUM SERPL-SCNC: 4.5 MMOL/L — SIGNIFICANT CHANGE UP (ref 3.5–5.3)
RBC # BLD: 3.55 M/UL — LOW (ref 4.2–5.8)
RBC # FLD: 15.9 % — HIGH (ref 10.3–14.5)
SODIUM SERPL-SCNC: 128 MMOL/L — LOW (ref 135–145)
WBC # BLD: 8.88 K/UL — SIGNIFICANT CHANGE UP (ref 3.8–10.5)
WBC # FLD AUTO: 8.88 K/UL — SIGNIFICANT CHANGE UP (ref 3.8–10.5)

## 2022-03-21 PROCEDURE — 99233 SBSQ HOSP IP/OBS HIGH 50: CPT

## 2022-03-21 PROCEDURE — 99232 SBSQ HOSP IP/OBS MODERATE 35: CPT

## 2022-03-21 RX ORDER — METHADONE HYDROCHLORIDE 40 MG/1
25 TABLET ORAL EVERY 8 HOURS
Refills: 0 | Status: COMPLETED | OUTPATIENT
Start: 2022-03-22 | End: 2022-03-29

## 2022-03-21 RX ORDER — SODIUM CHLORIDE 9 MG/ML
1000 INJECTION, SOLUTION INTRAVENOUS
Refills: 0 | Status: COMPLETED | OUTPATIENT
Start: 2022-03-21 | End: 2022-03-22

## 2022-03-21 RX ORDER — METHADONE HYDROCHLORIDE 40 MG/1
15 TABLET ORAL AT BEDTIME
Refills: 0 | Status: DISCONTINUED | OUTPATIENT
Start: 2022-03-21 | End: 2022-03-22

## 2022-03-21 RX ADMIN — METHADONE HYDROCHLORIDE 60 MILLIGRAM(S): 40 TABLET ORAL at 10:17

## 2022-03-21 RX ADMIN — Medication 2: at 12:06

## 2022-03-21 RX ADMIN — MORPHINE SULFATE 20 MILLIGRAM(S): 50 CAPSULE, EXTENDED RELEASE ORAL at 16:18

## 2022-03-21 RX ADMIN — ZOLPIDEM TARTRATE 5 MILLIGRAM(S): 10 TABLET ORAL at 00:02

## 2022-03-21 RX ADMIN — PANTOPRAZOLE SODIUM 40 MILLIGRAM(S): 20 TABLET, DELAYED RELEASE ORAL at 10:16

## 2022-03-21 RX ADMIN — MORPHINE SULFATE 20 MILLIGRAM(S): 50 CAPSULE, EXTENDED RELEASE ORAL at 08:25

## 2022-03-21 RX ADMIN — Medication 1 MILLIGRAM(S): at 10:17

## 2022-03-21 RX ADMIN — Medication 2: at 17:30

## 2022-03-21 RX ADMIN — MORPHINE SULFATE 20 MILLIGRAM(S): 50 CAPSULE, EXTENDED RELEASE ORAL at 10:00

## 2022-03-21 RX ADMIN — METHADONE HYDROCHLORIDE 15 MILLIGRAM(S): 40 TABLET ORAL at 21:48

## 2022-03-21 RX ADMIN — Medication 1 TABLET(S): at 10:17

## 2022-03-21 RX ADMIN — Medication 100 MILLIGRAM(S): at 21:48

## 2022-03-21 RX ADMIN — MORPHINE SULFATE 20 MILLIGRAM(S): 50 CAPSULE, EXTENDED RELEASE ORAL at 22:47

## 2022-03-21 RX ADMIN — MORPHINE SULFATE 20 MILLIGRAM(S): 50 CAPSULE, EXTENDED RELEASE ORAL at 04:07

## 2022-03-21 RX ADMIN — MORPHINE SULFATE 20 MILLIGRAM(S): 50 CAPSULE, EXTENDED RELEASE ORAL at 17:18

## 2022-03-21 RX ADMIN — PANTOPRAZOLE SODIUM 40 MILLIGRAM(S): 20 TABLET, DELAYED RELEASE ORAL at 21:48

## 2022-03-21 RX ADMIN — Medication 100 MILLIGRAM(S): at 10:16

## 2022-03-21 RX ADMIN — MORPHINE SULFATE 20 MILLIGRAM(S): 50 CAPSULE, EXTENDED RELEASE ORAL at 05:00

## 2022-03-21 RX ADMIN — Medication 1 CAPSULE(S): at 08:37

## 2022-03-21 RX ADMIN — Medication 1 CAPSULE(S): at 18:18

## 2022-03-21 RX ADMIN — MORPHINE SULFATE 20 MILLIGRAM(S): 50 CAPSULE, EXTENDED RELEASE ORAL at 21:47

## 2022-03-21 RX ADMIN — Medication 4: at 08:25

## 2022-03-21 RX ADMIN — MORPHINE SULFATE 20 MILLIGRAM(S): 50 CAPSULE, EXTENDED RELEASE ORAL at 13:07

## 2022-03-21 RX ADMIN — MORPHINE SULFATE 20 MILLIGRAM(S): 50 CAPSULE, EXTENDED RELEASE ORAL at 12:07

## 2022-03-21 NOTE — PROGRESS NOTE ADULT - ASSESSMENT
57 y/o male with PMHX of IVDA with heroin-- last used 2 months ago as per patient, opioid dependence on methadone, IDDM, chronic pancreatitis, and recent significant weight loss over the last 1 year admitted on 3/14 from [facility] in [place] for ERCP, further gi workup. The patient has left upper extremity picc line, had the ERCP done as well as peg tube placed. There is concern for pancreatic cancer, however the calcifications in the pancrease made access difficult for tissue to be obtained. Patient has no appetite, is cachectic and history per medical chart as patient is poor historian.     1. Patient admitted with pancreatitis, incidentally found to have vertebral disciitis on imaging, unclear if this truly represents an active infection  - follow up cultures   - serial cbc and monitor temperature   - reviewed prior medical records to evaluate for resistant or atypical pathogens   - MRI revealed thoracic osteo/disciitis  - day # 4 doxycycline  - tolerating antibiotics without rashes or side effects   - long term antibiotics discussion will need to be had  Will follow

## 2022-03-21 NOTE — PROGRESS NOTE ADULT - SUBJECTIVE AND OBJECTIVE BOX
Date of service: 03-21-22 @ 11:03      Patient sitting in chair; is weak, has been trying to stand with physical therapy      ROS: no fever or chills; denies dizziness, no HA, no SOB or cough, no abdominal pain, no diarrhea or constipation; no dysuria, no urinary frequency, no legs pain, no rashes    MEDICATIONS  (STANDING):  bisacodyl Suppository 10 milliGRAM(s) Rectal <User Schedule>  dextrose 40% Gel 15 Gram(s) Oral once  dextrose 5%. 1000 milliLiter(s) (100 mL/Hr) IV Continuous <Continuous>  dextrose 5%. 1000 milliLiter(s) (50 mL/Hr) IV Continuous <Continuous>  dextrose 50% Injectable 25 Gram(s) IV Push once  dextrose 50% Injectable 12.5 Gram(s) IV Push once  dextrose 50% Injectable 25 Gram(s) IV Push once  doxycycline hyclate Capsule 100 milliGRAM(s) Oral every 12 hours  folic acid 1 milliGRAM(s) Oral daily  glucagon  Injectable 1 milliGRAM(s) IntraMuscular once  insulin lispro (ADMELOG) corrective regimen sliding scale   SubCutaneous four times a day before meals  multivitamin 1 Tablet(s) Oral daily  pancrelipase (ZENPEP 20,000 Lipase Units) 1 Capsule(s) Oral three times a day with meals  pantoprazole  Injectable 40 milliGRAM(s) IV Push every 12 hours  thiamine 100 milliGRAM(s) Oral daily    MEDICATIONS  (PRN):  acetaminophen     Tablet .. 650 milliGRAM(s) Oral every 6 hours PRN Temp greater or equal to 38C (100.4F), Mild Pain (1 - 3)  ALBUTerol    90 MICROgram(s) HFA Inhaler 2 Puff(s) Inhalation every 6 hours PRN Bronchospasm  aluminum hydroxide/magnesium hydroxide/simethicone Suspension 30 milliLiter(s) Oral every 4 hours PRN Dyspepsia  melatonin 3 milliGRAM(s) Oral at bedtime PRN Insomnia  morphine   Solution 15 milliGRAM(s) Oral every 4 hours PRN Moderate Pain (4 - 6)  morphine   Solution 20 milliGRAM(s) Oral every 4 hours PRN Severe Pain (7 - 10)  ondansetron Injectable 4 milliGRAM(s) IV Push every 8 hours PRN Nausea and/or Vomiting  zolpidem 5 milliGRAM(s) Oral at bedtime PRN Insomnia      Vital Signs Last 24 Hrs  T(C): 36.9 (21 Mar 2022 08:53), Max: 36.9 (20 Mar 2022 20:13)  T(F): 98.5 (21 Mar 2022 08:53), Max: 98.5 (20 Mar 2022 20:13)  HR: 77 (21 Mar 2022 08:53) (77 - 81)  BP: 145/73 (21 Mar 2022 08:53) (136/78 - 146/86)  BP(mean): --  RR: 18 (21 Mar 2022 08:53) (16 - 18)  SpO2: 99% (21 Mar 2022 08:53) (98% - 100%)        Physical Exam:          Constitutional: frail looking, cachectic appearing  HEENT: NC/AT, EOMI, PERRLA, temporal wasting  Neck: supple; thyroid not palpable  Back: no tenderness  Respiratory: respiratory effort normal; clear to auscultation  Cardiovascular: S1S2 regular, no murmurs  Abdomen: soft, not tender, not distended, positive BS; no liver or spleen organomegaly, peg in place  Genitourinary: no suprapubic tenderness  Musculoskeletal: no muscle tenderness, no joint swelling or tenderness; right upper extremity picc line removed  Neurological/ Psychiatric: AxOx3, judgement and insight normal;  moving all extremities  Skin: no rashes; no palpable lesions    Labs: all available labs reviewed                       Labs:                         Labs:                        9.5    8.88  )-----------( 432      ( 21 Mar 2022 06:56 )             30.4     03-21    128<L>  |  94<L>  |  16  ----------------------------<  247<H>  4.5   |  30  |  0.47<L>    Ca    8.3<L>      21 Mar 2022 06:56  Phos  2.1     03-20  Mg     1.6     03-21             Cultures:       Culture - Blood (collected 03-17-22 @ 05:04)  Source: .Blood None  Preliminary Report (03-18-22 @ 11:01):    No growth to date.      Ferritin, Serum: 447 ng/mL (03-15-22 @ 07:30)       < from: CT Chest w/ IV Cont (03.17.22 @ 10:35) >    IMPRESSION:    No pulmonary embolism.    Few ill-defined clustered nodules in the right lower lobe, likely related   impacted bronchioles.    3 mm right upper lobe nodule. Recommend follow-up per protocol of primary   pancreatic cancer.    Trace pleural effusions.    Vertebral body erosions and sclerosis abutting the the T6-T7 disc space   and thickening of the paraspinal soft tissues, raising suspicion for   discitis/osteomyelitis.    < end of copied text >      < from: MR Thoracic Spine w/wo IV Cont (03.19.22 @ 12:36) >    ACC: [id number] EXAM:  MR SPINE THORACIC WAW IC                          PROCEDURE DATE:  03/19/2022          INTERPRETATION:  MR thoracic with and without gadolinium    CLINICAL INDICATION:  Pancreatic mass, IVDA  , back pain    TECHNIQUE:   Sagittal and axial T1-weighted images, sagittal STIR images,    and sagittal and axial T2-weighted images of the thoracic spine were   obtained.   Following 7.5 cc of Gadavist administration intravenously/ 0   cc discarded , sagittal and axial T1-weighted fat-saturated images were   obtained.    FINDINGS:   No prior similar studies are available for review.    Acute osteomyelitis/discitis identified at T6-7 with enhancement and   increased signal within the inferior T6 and superior T7 endplates and   with in the intervening disc. Enhancement in the paravertebral soft   tissues at this level and ventral epidural space is noted, LEFT greater   than RIGHT.    Thoracic vertebral alignment is preserved.  Thoracic vertebral body   heights are maintained.  Marrow signal intensity within thoracic   vertebral bodies and posterior elements is significant for marrow edema   at the T6-7 disc endplates.    Thoracic intervertebral discs are otherwise preserved. Tiny LEFT   paracentral disc herniation noted at T8-9 without cord impingement.    The thoracic cord maintains intact morphology.  Thoracic cord signal   intensity is intact.  No abnormal enhancement occurs within the canal.    Atelectasis is seen within the posterior medial lungs.    Cervical spondylosis noted at C4-5 through C6-C7 on the sagittal   T2-weighted localizing sequence.      IMPRESSION:  Acute osteomyelitis/discitis identified at T6-7 with   enhancement and increased signal within the inferior T6 and superior T7   endplates and within the intervening disc. Enhancement in the   paravertebral soft tissues at this level and ventral epidural space is   noted, LEFT greater than RIGHT.    < end of copied text >            Radiology: all available radiological tests reviewed    Advanced directives addressed: full resuscitation

## 2022-03-21 NOTE — PROGRESS NOTE ADULT - ASSESSMENT
Assessment:   56 year old male phx of IVDA with heroin last used 2 months  ago as per patient, opioid dependence on methadone, IDDM, chronic pancreatitis,  and recent significant weight loss over admitted for panreatitis, found to have  pancreatic mass.      Process of Care  --Reviewed dx/treatment problems and alignment with Goals of Care    Physical Aspects of Care  --Pain  convert to PO   give 15mg Methadone today  DC 60mg Daily  START 25mg TID Methadone  PO in AM  c/w breakthrough morphine  monitor QT       --Bowel Regimen  +constipation  risk for constipation d/t immobility and opiates  Daily senna    --Dyspnea  No SOB at this time  comfortable and in NAD    --Nausea Vomiting  denies    --Weakness  PT as tolerated     Psychological and Psychiatric Aspects of Care:   Grief Bereavement emotional support provided  --Hx of psychiatric dx: none  -Pastoral Care Available PRN     Social Aspects of Care  -SW involved     Cultural Aspects  -Primary Language: English    Goals of Care:      currently FULL CODE   GOC  at this time unchanged.       Ethical and Legal Aspects:   NA    Capacity- pt w/ capacity for complex decisions    HCP/Surrogate: Eusebio Pugh (brother)  Code Status- FULL   MOLST- NA  Dispo Plan-  Discussed With:  Case coordinated with attending and SW and RN     Time Spent: 45 minutes including the care, coordination and counseling of this patient, 50% of which was spent coordinating and counseling.

## 2022-03-21 NOTE — PROGRESS NOTE ADULT - ASSESSMENT
55 y/o male with PMHX of IVDA with heroin , opioid dependence on methadone, IDDM, chronic pancreatitis, abnormal weight loss over the last 1 year who was a transfer from [facility] ([place]) on 3/14/22  for possible ERCP.  Patient initially presented to outside hospital for worsening abdominal pain.  Patient had CT/ MRI/ US of the abdomen which showed chronic pancreatitis, gastric distention (for which he had an NGT),  intra/ extra hepatic biliary ductal dilatation, and fluid collection/  mass in the head of the pancreas compressing the SMV and proximal portal vein.    Upon admission, patient with significantly elevated ALK PHOS 1200.      Epigastric abdominal pain due to   Pancreatic mass,  associated with biliary and pancreatic duct dilatation , pancreatic stones with underlying chronic pancreatitis s/p ERCP, EUS s/p FNA  Partial gastric outlet obstruction due to severe duodenal inflammation s/p NGT now s/p GJ tube   Cholelithiasis and marked gallbladder distention without acute cholecystitis  Elevated CA 19-9   - 3/16/22 - s/p EUS and FNA of the pancreatic mass , ERCP, pancreatic sphincterotomy , s/p pancreatic stent , s/p GJ tube   - s/p GJ tube- resume TFs with recreational PO feeds   - watch for refeeding syndrome; replace Mg/Phos PRN   - complete workup with tumor markers/CT chest/Heme-Onc Cx - results noted - no mets to chest/follow-up biopsy results     Hematemesis 3/15/22 Acute blood loss anemia due to upper GI bleeding   Iron deficiency   - s/p lovenox 40 3/14  - d/sherice   - Cont Protonix drip- change to IVP   - s/p  2 Units PRBC  - SKIN TEAR at GJ site : causing the anemia - to repair today, monitor H&H closely, resume TFs as tolerated     Mild leukocytosis likely due to malignancy  LLL opacity suspected Pneumonia possible aspiration  On CT abd there was suspicion of  LLL PNA and was on ZOsyn  CT chest does not show any consolidation - therefore there is no pneumonia and will stop Zosyn  There is possible discitis on CT imaging and will get MRI to r/o DISCITIS/OM vs Metastatic lesion   Discussed with DR Lewis and started on Doxy for possible discitis - MRI T SPINE confirms this   Patient has a MIDLINE (he came with it)  and plan to  dc especially in light of IVDA - this fell off today 3/19    DM type2 with A1C 9.9 with hypoglycemic episode on 3/15   Stop TFs as patient unable to tolerate it - had emeses and then became hypoglycemia  So cont PO DIET, STOP TFs, STOP LANTUS until patient able to tolerate PO DIET consistently  use RISS only     Abnormal Weight Loss   Severe Protein Calorie Malnutrition/ Cachexia      Severe hypoalbuminemia  Hyponatremia 2/2 poor po intake   resume IVFs     IVDA/ Opioid Dependence  - Patient was on methadone 60 mg daily, discussed with [facility] Dr Hernandez - will place on methadone 25 TID.    - can get morphine po as needed     DVT Proph:  resume LOVENOX 40 tmr if H&H stable ; monitor CBC closely     DISPO - gentle IVFs; repair skin tear at GJ junction, monitor H&H;   on PO and to resume TFs today; MRI spine with OM, midline fell off; place on lantus if patient tolerates diet

## 2022-03-21 NOTE — PROGRESS NOTE ADULT - ASSESSMENT
56 year old man transferred here for failure to thrive and severe chronic pancreatitis from polysubstance abuse, now improving. With increase PO intake and still with GJ tube feeds. Tolerating both. Still with + bleeding beneath bumper GJ. Resecured- for possible suture.    Plan:  ::Continue to encourage PO and use GJ tube for feeds regularly  ::Pain control prn  ::Watch GJ drain bleeding, change dressing prn    Discussed with Dr. Lemon 56 year old man transferred here for failure to thrive and severe chronic pancreatitis from polysubstance abuse, now improving. With increase PO intake and still with GJ tube feeds. Tolerating both. Still with + bleeding beneath bumper GJ. Resecured- for possible suture.    Plan:  ::Continue to encourage PO and use GJ tube for feeds regularly  ::Pain control prn      Discussed with Dr. Lemon

## 2022-03-21 NOTE — PROGRESS NOTE ADULT - SUBJECTIVE AND OBJECTIVE BOX
HPI:    3/21  pt reports frequent use of  morphine (80mg PO morphine in 24 hours + 60mg Methadone standing d/t hx of IVDA)  case d/w patient and team.   Dose 15mg tonight of Methadone   Tomorrow DC 60mg and Start 25mg Methadone TID    Pt denies nausea vomiting shortness of breath.     PAIN: (x )Yes   ( )No  Level: severe  Location: abdominal pain  Intensity:    8/10  Quality: aching and sharp  Aggravating Factors: movement  Alleviating Factors: pain meds  Radiation: back  Duration/Timing: constant  Impact on ADLs: signficant    DYSPNEA: ( ) Yes  (x ) No  Level:        Review of Systems:    Anxiety- + hx   Depression- + hx  Physical Discomfort- still has discomfort better than admission  Dyspnea- none   Constipation- none  Nausea- ++   Vomiting- not at this time  Anorexia- pt hungry today  Weight Loss- significant   Cough- none  Secretions- none  Fatigue- +  insomnia  Weakness- ++  Delirium- none    All other systems reviewed and negative      PHYSICAL EXAM:    Vital Signs Last 24 Hrs  T(C): 36.6 (21 Mar 2022 21:29), Max: 37 (21 Mar 2022 14:54)  T(F): 97.8 (21 Mar 2022 21:29), Max: 98.6 (21 Mar 2022 14:54)  HR: 73 (21 Mar 2022 21:29) (72 - 77)  BP: 141/86 (21 Mar 2022 21:29) (141/86 - 148/76)  BP(mean): --  RR: 18 (21 Mar 2022 21:29) (16 - 18)  SpO2: 100% (21 Mar 2022 21:29) (99% - 100%)  Daily     Daily       PPSV2:30   %  FAST:    General: cachectic, severe frailty  Mental Status: a+Ox3  HEENT: EOMI PERRL  Lungs: ctabl bl no wheezing or rales  Cardiac: s1s2  GI: abdomen w/ some tenderness J-G tube  : voids  Ext: moves all 4 extremities spontaneously overall generalized weakness  Neuro: follows commands        LABS:                        9.5    8.88  )-----------( 432      ( 21 Mar 2022 06:56 )             30.4     03-21    128<L>  |  94<L>  |  16  ----------------------------<  247<H>  4.5   |  30  |  0.47<L>    Ca    8.3<L>      21 Mar 2022 06:56  Mg     1.6     03-21        Albumin: Albumin, Serum: 1.4 g/dL (03-18 @ 05:04)      Allergies    No Known Allergies    Intolerances      MEDICATIONS  (STANDING):  bisacodyl Suppository 10 milliGRAM(s) Rectal <User Schedule>  dextrose 40% Gel 15 Gram(s) Oral once  dextrose 5%. 1000 milliLiter(s) (100 mL/Hr) IV Continuous <Continuous>  dextrose 5%. 1000 milliLiter(s) (50 mL/Hr) IV Continuous <Continuous>  dextrose 50% Injectable 25 Gram(s) IV Push once  dextrose 50% Injectable 12.5 Gram(s) IV Push once  dextrose 50% Injectable 25 Gram(s) IV Push once  doxycycline hyclate Capsule 100 milliGRAM(s) Oral every 12 hours  folic acid 1 milliGRAM(s) Oral daily  glucagon  Injectable 1 milliGRAM(s) IntraMuscular once  insulin lispro (ADMELOG) corrective regimen sliding scale   SubCutaneous four times a day before meals  methadone    Tablet 25 milliGRAM(s) Oral every 8 hours  methadone    Tablet 15 milliGRAM(s) Oral at bedtime  multivitamin 1 Tablet(s) Oral daily  pancrelipase (ZENPEP 20,000 Lipase Units) 1 Capsule(s) Oral three times a day with meals  pantoprazole  Injectable 40 milliGRAM(s) IV Push every 12 hours  sodium chloride 0.9% 1000 milliLiter(s) (60 mL/Hr) IV Continuous <Continuous>  thiamine 100 milliGRAM(s) Oral daily    MEDICATIONS  (PRN):  acetaminophen     Tablet .. 650 milliGRAM(s) Oral every 6 hours PRN Temp greater or equal to 38C (100.4F), Mild Pain (1 - 3)  ALBUTerol    90 MICROgram(s) HFA Inhaler 2 Puff(s) Inhalation every 6 hours PRN Bronchospasm  aluminum hydroxide/magnesium hydroxide/simethicone Suspension 30 milliLiter(s) Oral every 4 hours PRN Dyspepsia  melatonin 3 milliGRAM(s) Oral at bedtime PRN Insomnia  morphine   Solution 15 milliGRAM(s) Oral every 4 hours PRN Moderate Pain (4 - 6)  morphine   Solution 20 milliGRAM(s) Oral every 4 hours PRN Severe Pain (7 - 10)  ondansetron Injectable 4 milliGRAM(s) IV Push every 8 hours PRN Nausea and/or Vomiting  zolpidem 5 milliGRAM(s) Oral at bedtime PRN Insomnia      RADIOLOGY:

## 2022-03-21 NOTE — PROGRESS NOTE ADULT - SUBJECTIVE AND OBJECTIVE BOX
55 y/o male with PMHX of IVDA with heroin(  last used 2 months ago ) , opioid dependence on methadone, IDDM, chronic pancreatitis, and recent significant weight loss over the last 1 year who was a transfer from [facility] on 3/14/22  for possible ERCP.  Patient initially presented to outside hospital for worsening abdominal pain.  Patient had CT/ MRI/ US of the abdomen which showed chronic pancreatitis, gastric distention (for which he had an NGT),  intra/ extra hepatic biliary ductal dilatation, and fluid collection/ ? mass in the head of the pancreas compressing the SMV and proximal portal vein.  Patient was transferred to  for possible need for ERCP and further GI evaluation.  Upon admission, patient with significantly elevated ALKPHOS 1200.      Hospital course :   3/15/22- events of last night noted. Had coffee-ground emesis and started on Protonix drip. NGT to suction. NPO  3/16 - pt seen and examined, + NGT in place, reports severe abdominal pain, +constipation, denies cp, dyspnea, afebrile  3/17 - no epigastric/low chest pain, restrosternal earlier and trops negative; no further cp palps sob; abdo pain fluctuating   3/18 - no cp palps sob - has back pain and admit to IVDA but states it was more than 2 mos ago, patient ate lunch and has TFs as well   3/19 - no cp palps sob - some abdo distress - he did not tolerate the TFs and he had two episodes of vomiting - TFs now dced - hes eating po   3/20 - no cp palps sob - no n/v now, still with some abdo discomfort;  to resume Tfs today  3/21 - H&H drifting down; tear at GJ site that is bleeding; denies cp palps sob, missed lunch today.     PHYSICAL EXAM:  Vital Signs Last 24 Hrs  T(F): 98.6 (21 Mar 2022 14:54), Max: 98.6 (21 Mar 2022 14:54)  HR: 72 (21 Mar 2022 14:54) (72 - 78)  BP: 148/76 (21 Mar 2022 14:54) (145/73 - 148/76)  RR: 16 (21 Mar 2022 14:54) (16 - 18)  SpO2: 100% (21 Mar 2022 14:54) (99% - 100%)  GENERAL: cachectic,  NAD  HEAD:  Atraumatic, Normocephalic  EYES: EOMI, PERRLA, conjunctiva and sclera clear  NECK: Supple, No JVD  NERVOUS SYSTEM:  Alert & Oriented X2, moves all extremities  CHEST/LUNG: Clear to auscultation bilaterally; No rales, rhonchi, wheezing, or rubs  HEART: Regular rate and rhythm; No murmurs, rubs, or gallops  ABDOMEN: Soft, fullness, +BS, GJ tube present, bleeding from site  GENITOURINARY- Voiding, no palpable bladder  EXTREMITIES:  2+ Peripheral Pulses, No clubbing, cyanosis, or edema  MUSCULOSKELETAL No muscle tenderness, Muscle tone normal, No joint tenderness, no Joint swelling, Joint range of motion-normal    RADIOLOGY & ADDITIONAL TESTS:  MR MRCP w/wo IV Cont (03.15.22 @ 15:44) >  Pancreatic head mass suspicious for neoplasm with associated biliary and   pancreatic ductal dilatation. Recommend biopsy.  Upper abdominal lymphadenopathy  Distended stomach. Recommend clinical correlation for partial gastric   outlet obstruction    CT ABDOMEN AND PELVIS IC                        IMPRESSION:  Chronic pancreatitis.  Pancreatic ductal dilatation due to multiple intraductal stones.  Biliary ductal dilatation the exact etiology which could be due to post   pancreatic stricture. Consider further evaluation with MRI or endoscopic   ultrasound as an occult pancreatic mass is not excluded.  Cystic pancreatic lesions most likely representing pseudocysts.  Cholelithiasis and marked gallbladder distention, without evidence of   acute cholecystitis.  Mild left lower lobe opacity suspicious for pneumonia.  Enteric tube with tip in the distal esophagus.    < from: MR Thoracic Spine w/wo IV Cont (03.19.22 @ 12:36) >  IMPRESSION:  Acute osteomyelitis/discitis identified at T6-7 with   enhancement and increased signal within the inferior T6 and superior T7   endplates and within the intervening disc. Enhancement in the   paravertebral soft tissues at this level and ventral epidural space is   noted, LEFT greater than RIGHT.    LABS: All Labs Reviewed:                        9.5    8.88  )-----------( 432      ( 21 Mar 2022 06:56 )             30.4     128<L>  |  94<L>  |  16  ----------------------------<  247<H>  4.5   |  30  |  0.47<L>    Ca    8.3<L>      21 Mar 2022 06:56  Phos  2.1     03-20  Mg     1.6     03-21    MEDS:   acetaminophen     Tablet .. 650 milliGRAM(s) Oral every 6 hours PRN  ALBUTerol    90 MICROgram(s) HFA Inhaler 2 Puff(s) Inhalation every 6 hours PRN  aluminum hydroxide/magnesium hydroxide/simethicone Suspension 30 milliLiter(s) Oral every 4 hours PRN  bisacodyl Suppository 10 milliGRAM(s) Rectal <User Schedule>  doxycycline hyclate Capsule 100 milliGRAM(s) Oral every 12 hours  folic acid 1 milliGRAM(s) Oral daily  insulin lispro (ADMELOG) corrective regimen sliding scale   SubCutaneous four times a day before meals  melatonin 3 milliGRAM(s) Oral at bedtime PRN  methadone    Tablet 15 milliGRAM(s) Oral at bedtime  morphine   Solution 20 milliGRAM(s) Oral every 4 hours PRN  morphine   Solution 15 milliGRAM(s) Oral every 4 hours PRN  multivitamin 1 Tablet(s) Oral daily  ondansetron Injectable 4 milliGRAM(s) IV Push every 8 hours PRN  pancrelipase (ZENPEP 20,000 Lipase Units) 1 Capsule(s) Oral three times a day with meals  pantoprazole  Injectable 40 milliGRAM(s) IV Push every 12 hours  thiamine 100 milliGRAM(s) Oral daily  zolpidem 5 milliGRAM(s) Oral at bedtime PRN

## 2022-03-21 NOTE — PROGRESS NOTE ADULT - SUBJECTIVE AND OBJECTIVE BOX
Patient is a 56y old  Male who presents with a chief complaint of abdominal pain-- transfer for possible ERCP (19 Mar 2022 18:09). Follow up for abdominal pain, nutritional status.     Patient seen this afternoon, did have breakfast "a muffin," and tolerated. He "forgot to order lunch." Denies nausea, vomiting diarrhea.  Endorses abdominal pain primarily on left side with manipulation GJ tube or movement. GJ tube still oozing. Per primary care RN- dressing changed twice per shift today and over weekend same. Some clots on bumper but + slow oozing beneath bumper. Resecured and placed dressing. May need suture. Patient with back pain now on abx for + discitis. Appreciate ID input.      MEDICATIONS  (STANDING):  bisacodyl Suppository 10 milliGRAM(s) Rectal <User Schedule>  dextrose 40% Gel 15 Gram(s) Oral once  dextrose 5%. 1000 milliLiter(s) IV Continuous <Continuous>  dextrose 5%. 1000 milliLiter(s) IV Continuous <Continuous>  dextrose 50% Injectable 25 Gram(s) IV Push once  dextrose 50% Injectable 12.5 Gram(s) IV Push once  dextrose 50% Injectable 25 Gram(s) IV Push once  doxycycline hyclate Capsule 100 milliGRAM(s) Oral every 12 hours  folic acid 1 milliGRAM(s) Oral daily  glucagon  Injectable 1 milliGRAM(s) IntraMuscular once  insulin lispro (ADMELOG) corrective regimen sliding scale   SubCutaneous four times a day before meals  methadone    Tablet 15 milliGRAM(s) Oral at bedtime  multivitamin 1 Tablet(s) Oral daily  pancrelipase (ZENPEP 20,000 Lipase Units) 1 Capsule(s) Oral three times a day with meals  pantoprazole  Injectable 40 milliGRAM(s) IV Push every 12 hours  thiamine 100 milliGRAM(s) Oral daily    MEDICATIONS  (PRN):  acetaminophen     Tablet .. 650 milliGRAM(s) Oral every 6 hours PRN Temp greater or equal to 38C (100.4F), Mild Pain (1 - 3)  ALBUTerol    90 MICROgram(s) HFA Inhaler 2 Puff(s) Inhalation every 6 hours PRN Bronchospasm  aluminum hydroxide/magnesium hydroxide/simethicone Suspension 30 milliLiter(s) Oral every 4 hours PRN Dyspepsia  melatonin 3 milliGRAM(s) Oral at bedtime PRN Insomnia  morphine   Solution 15 milliGRAM(s) Oral every 4 hours PRN Moderate Pain (4 - 6)  morphine   Solution 20 milliGRAM(s) Oral every 4 hours PRN Severe Pain (7 - 10)  ondansetron Injectable 4 milliGRAM(s) IV Push every 8 hours PRN Nausea and/or Vomiting  zolpidem 5 milliGRAM(s) Oral at bedtime PRN Insomnia    Vital Signs Last 24 Hrs  T(C): 37 (03-21-22 @ 14:54), Max: 37 (03-21-22 @ 14:54)  T(F): 98.6 (03-21-22 @ 14:54), Max: 98.6 (03-21-22 @ 14:54)  HR: 72 (03-21-22 @ 14:54) (72 - 81)  BP: 148/76 (03-21-22 @ 14:54) (136/78 - 148/76)  BP(mean): --  RR: 16 (03-21-22 @ 14:54) (16 - 18)  SpO2: 100% (03-21-22 @ 14:54) (98% - 100%)      PHYSICAL EXAM:    Constitutional: No acute distress, chronically ill appearing but  non-toxic appearing  HEENT: un-masked, good phonation, not icteric  Neck: supple, no lymphadenopathy  Respiratory: clear to ascultation bilaterally anteriorly, no wheezing  Cardiovascular: S1 and S2, regular rate and rhythm, no murmurs rubs or gallops  Gastrointestinal: soft, non-tender, non-distended, +bowel sounds, no rebound or guarding, no surgical scars, +GJ tube dressing + oozing, no feeds running  Extremities: No peripheral edema, no cyanosis or clubbing  Vascular: 2+ peripheral pulses, no venous stasis  Neurological: A/O x 3, no focal deficits, no asterixis  Psychiatric: Normal mood, normal affect  Skin: No rashes, not jaundiced    LABS:                                              9.5    8.88  )-----------( 432      ( 21 Mar 2022 06:56 )             30.4       03-21    128<L>  |  94<L>  |  16  ----------------------------<  247<H>  4.5   |  30  |  0.47<L>    Ca    8.3<L>      21 Mar 2022 06:56  Phos  2.1     03-20  Mg     1.6     03-21                RADIOLOGY & ADDITIONAL STUDIES: Patient is a 56y old  Male who presents with a chief complaint of abdominal pain-- transfer for possible ERCP (19 Mar 2022 18:09). Follow up for abdominal pain, nutritional status.     Patient seen this afternoon, did have breakfast "a muffin," and tolerated. He "forgot to order lunch." Denies nausea, vomiting diarrhea.  Endorses abdominal pain primarily on left side with manipulation GJ tube or movement. GJ tube still oozing. Per primary care RN- dressing changed twice per shift today and over weekend same. Some clots on bumper but + slow oozing beneath bumper. Resecured and placed dressing. May need suture. Patient with back pain now on abx for + discitis. Appreciate ID input.      MEDICATIONS  (STANDING):  bisacodyl Suppository 10 milliGRAM(s) Rectal <User Schedule>  dextrose 40% Gel 15 Gram(s) Oral once  dextrose 5%. 1000 milliLiter(s) IV Continuous <Continuous>  dextrose 5%. 1000 milliLiter(s) IV Continuous <Continuous>  dextrose 50% Injectable 25 Gram(s) IV Push once  dextrose 50% Injectable 12.5 Gram(s) IV Push once  dextrose 50% Injectable 25 Gram(s) IV Push once  doxycycline hyclate Capsule 100 milliGRAM(s) Oral every 12 hours  folic acid 1 milliGRAM(s) Oral daily  glucagon  Injectable 1 milliGRAM(s) IntraMuscular once  insulin lispro (ADMELOG) corrective regimen sliding scale   SubCutaneous four times a day before meals  methadone    Tablet 15 milliGRAM(s) Oral at bedtime  multivitamin 1 Tablet(s) Oral daily  pancrelipase (ZENPEP 20,000 Lipase Units) 1 Capsule(s) Oral three times a day with meals  pantoprazole  Injectable 40 milliGRAM(s) IV Push every 12 hours  thiamine 100 milliGRAM(s) Oral daily    MEDICATIONS  (PRN):  acetaminophen     Tablet .. 650 milliGRAM(s) Oral every 6 hours PRN Temp greater or equal to 38C (100.4F), Mild Pain (1 - 3)  ALBUTerol    90 MICROgram(s) HFA Inhaler 2 Puff(s) Inhalation every 6 hours PRN Bronchospasm  aluminum hydroxide/magnesium hydroxide/simethicone Suspension 30 milliLiter(s) Oral every 4 hours PRN Dyspepsia  melatonin 3 milliGRAM(s) Oral at bedtime PRN Insomnia  morphine   Solution 15 milliGRAM(s) Oral every 4 hours PRN Moderate Pain (4 - 6)  morphine   Solution 20 milliGRAM(s) Oral every 4 hours PRN Severe Pain (7 - 10)  ondansetron Injectable 4 milliGRAM(s) IV Push every 8 hours PRN Nausea and/or Vomiting  zolpidem 5 milliGRAM(s) Oral at bedtime PRN Insomnia    Vital Signs Last 24 Hrs  T(C): 37 (03-21-22 @ 14:54), Max: 37 (03-21-22 @ 14:54)  T(F): 98.6 (03-21-22 @ 14:54), Max: 98.6 (03-21-22 @ 14:54)  HR: 72 (03-21-22 @ 14:54) (72 - 81)  BP: 148/76 (03-21-22 @ 14:54) (136/78 - 148/76)  BP(mean): --  RR: 16 (03-21-22 @ 14:54) (16 - 18)  SpO2: 100% (03-21-22 @ 14:54) (98% - 100%)      PHYSICAL EXAM:    Constitutional: No acute distress, chronically ill appearing but  non-toxic appearing  HEENT: un-masked, good phonation, not icteric  Neck: supple, no lymphadenopathy  Respiratory: clear to ascultation bilaterally anteriorly, no wheezing  Cardiovascular: S1 and S2, regular rate and rhythm, no murmurs rubs or gallops  Gastrointestinal: soft, non-tender, non-distended, +bowel sounds, no rebound or guarding, no surgical scars, +GJ tube dressing + oozing, no feeds running  Extremities: No peripheral edema, no cyanosis or clubbing  Vascular: 2+ peripheral pulses, no venous stasis  Neurological: A/O x 3, no focal deficits, no asterixis  Psychiatric: Normal mood, normal affect  Skin: No rashes, not jaundiced    LABS:                                              9.5    8.88  )-----------( 432      ( 21 Mar 2022 06:56 )             30.4       03-21    128<L>  |  94<L>  |  16  ----------------------------<  247<H>  4.5   |  30  |  0.47<L>    Ca    8.3<L>      21 Mar 2022 06:56  Phos  2.1     03-20  Mg     1.6     03-21

## 2022-03-22 ENCOUNTER — NON-APPOINTMENT (OUTPATIENT)
Age: 57
End: 2022-03-22

## 2022-03-22 PROBLEM — Z00.00 ENCOUNTER FOR PREVENTIVE HEALTH EXAMINATION: Status: ACTIVE | Noted: 2022-03-22

## 2022-03-22 LAB
ANION GAP SERPL CALC-SCNC: 2 MMOL/L — LOW (ref 5–17)
BUN SERPL-MCNC: 13 MG/DL — SIGNIFICANT CHANGE UP (ref 7–23)
CALCIUM SERPL-MCNC: 8.5 MG/DL — SIGNIFICANT CHANGE UP (ref 8.5–10.1)
CHLORIDE SERPL-SCNC: 95 MMOL/L — LOW (ref 96–108)
CO2 SERPL-SCNC: 33 MMOL/L — HIGH (ref 22–31)
CREAT SERPL-MCNC: 0.5 MG/DL — SIGNIFICANT CHANGE UP (ref 0.5–1.3)
CULTURE RESULTS: SIGNIFICANT CHANGE UP
EGFR: 120 ML/MIN/1.73M2 — SIGNIFICANT CHANGE UP
GLUCOSE SERPL-MCNC: 269 MG/DL — HIGH (ref 70–99)
HCT VFR BLD CALC: 29.7 % — LOW (ref 39–50)
HGB BLD-MCNC: 9.5 G/DL — LOW (ref 13–17)
MAGNESIUM SERPL-MCNC: 1.8 MG/DL — SIGNIFICANT CHANGE UP (ref 1.6–2.6)
PHOSPHATE SERPL-MCNC: 2.7 MG/DL — SIGNIFICANT CHANGE UP (ref 2.5–4.5)
POTASSIUM SERPL-MCNC: 4.2 MMOL/L — SIGNIFICANT CHANGE UP (ref 3.5–5.3)
POTASSIUM SERPL-SCNC: 4.2 MMOL/L — SIGNIFICANT CHANGE UP (ref 3.5–5.3)
SODIUM SERPL-SCNC: 130 MMOL/L — LOW (ref 135–145)
SPECIMEN SOURCE: SIGNIFICANT CHANGE UP

## 2022-03-22 PROCEDURE — 99498 ADVNCD CARE PLAN ADDL 30 MIN: CPT

## 2022-03-22 PROCEDURE — 99232 SBSQ HOSP IP/OBS MODERATE 35: CPT

## 2022-03-22 PROCEDURE — 99497 ADVNCD CARE PLAN 30 MIN: CPT

## 2022-03-22 PROCEDURE — 99233 SBSQ HOSP IP/OBS HIGH 50: CPT

## 2022-03-22 RX ORDER — ENOXAPARIN SODIUM 100 MG/ML
40 INJECTION SUBCUTANEOUS EVERY 24 HOURS
Refills: 0 | Status: DISCONTINUED | OUTPATIENT
Start: 2022-03-22 | End: 2022-03-29

## 2022-03-22 RX ORDER — INSULIN GLARGINE 100 [IU]/ML
8 INJECTION, SOLUTION SUBCUTANEOUS EVERY MORNING
Refills: 0 | Status: DISCONTINUED | OUTPATIENT
Start: 2022-03-22 | End: 2022-03-24

## 2022-03-22 RX ORDER — POLYETHYLENE GLYCOL 3350 17 G/17G
17 POWDER, FOR SOLUTION ORAL
Refills: 0 | Status: DISCONTINUED | OUTPATIENT
Start: 2022-03-22 | End: 2022-03-29

## 2022-03-22 RX ADMIN — Medication 100 MILLIGRAM(S): at 11:12

## 2022-03-22 RX ADMIN — Medication 2: at 17:08

## 2022-03-22 RX ADMIN — Medication 8: at 08:26

## 2022-03-22 RX ADMIN — MORPHINE SULFATE 20 MILLIGRAM(S): 50 CAPSULE, EXTENDED RELEASE ORAL at 11:07

## 2022-03-22 RX ADMIN — MORPHINE SULFATE 20 MILLIGRAM(S): 50 CAPSULE, EXTENDED RELEASE ORAL at 20:52

## 2022-03-22 RX ADMIN — ZOLPIDEM TARTRATE 5 MILLIGRAM(S): 10 TABLET ORAL at 22:16

## 2022-03-22 RX ADMIN — Medication 1 MILLIGRAM(S): at 11:11

## 2022-03-22 RX ADMIN — METHADONE HYDROCHLORIDE 25 MILLIGRAM(S): 40 TABLET ORAL at 06:09

## 2022-03-22 RX ADMIN — MORPHINE SULFATE 20 MILLIGRAM(S): 50 CAPSULE, EXTENDED RELEASE ORAL at 15:32

## 2022-03-22 RX ADMIN — MORPHINE SULFATE 20 MILLIGRAM(S): 50 CAPSULE, EXTENDED RELEASE ORAL at 04:08

## 2022-03-22 RX ADMIN — Medication 100 MILLIGRAM(S): at 11:10

## 2022-03-22 RX ADMIN — METHADONE HYDROCHLORIDE 25 MILLIGRAM(S): 40 TABLET ORAL at 21:34

## 2022-03-22 RX ADMIN — SODIUM CHLORIDE 60 MILLILITER(S): 9 INJECTION, SOLUTION INTRAVENOUS at 21:35

## 2022-03-22 RX ADMIN — METHADONE HYDROCHLORIDE 25 MILLIGRAM(S): 40 TABLET ORAL at 14:00

## 2022-03-22 RX ADMIN — Medication 1 CAPSULE(S): at 12:00

## 2022-03-22 RX ADMIN — SODIUM CHLORIDE 60 MILLILITER(S): 9 INJECTION, SOLUTION INTRAVENOUS at 06:16

## 2022-03-22 RX ADMIN — Medication 100 MILLIGRAM(S): at 21:34

## 2022-03-22 RX ADMIN — Medication 1 CAPSULE(S): at 08:35

## 2022-03-22 RX ADMIN — ENOXAPARIN SODIUM 40 MILLIGRAM(S): 100 INJECTION SUBCUTANEOUS at 11:10

## 2022-03-22 RX ADMIN — INSULIN GLARGINE 8 UNIT(S): 100 INJECTION, SOLUTION SUBCUTANEOUS at 11:57

## 2022-03-22 RX ADMIN — ZOLPIDEM TARTRATE 5 MILLIGRAM(S): 10 TABLET ORAL at 00:01

## 2022-03-22 RX ADMIN — Medication 1 TABLET(S): at 11:12

## 2022-03-22 RX ADMIN — PANTOPRAZOLE SODIUM 40 MILLIGRAM(S): 20 TABLET, DELAYED RELEASE ORAL at 21:34

## 2022-03-22 RX ADMIN — PANTOPRAZOLE SODIUM 40 MILLIGRAM(S): 20 TABLET, DELAYED RELEASE ORAL at 11:12

## 2022-03-22 RX ADMIN — MORPHINE SULFATE 20 MILLIGRAM(S): 50 CAPSULE, EXTENDED RELEASE ORAL at 20:12

## 2022-03-22 RX ADMIN — Medication 1 CAPSULE(S): at 17:33

## 2022-03-22 RX ADMIN — Medication 8: at 11:59

## 2022-03-22 NOTE — PROGRESS NOTE ADULT - ASSESSMENT
56 year old man transferred here for failure to thrive and severe chronic pancreatitis from polysubstance abuse, now improving. With increase PO intake and still with GJ tube feeds. Tolerating both. Still with + bleeding beneath bumper GJ. Resecured- for possible suture.    Plan:  ::Continue to encourage PO and use GJ tube for feeds regularly  ::Pain control prn      Discussed with Dr. Lemon 56 year old man transferred here for failure to thrive and severe chronic pancreatitis from polysubstance abuse, now improving. With increase PO intake and still with GJ tube feeds. Tolerating both but reporting constipation---> bowel regimen and increase PO fluids.    Plan:  ::Bowel regimen  ::Continue to encourage PO and use GJ tube for feeds regularly  ::Pain control prn    Discussed with Dr. Lemon 56 year old man transferred here for failure to thrive and severe chronic pancreatitis from polysubstance abuse, now improving. With increase PO intake and still with GJ tube feeds. Tolerating both but reporting constipation---> bowel regimen and increase PO fluids.    Plan:  ::Bowel regimen  ::Continue to encourage PO and use GJ tube for feeds regularly  ::Pain control prn  :: Chest pain could be due to doxycycline. If taking doxycycline, sit up, full glass of water, and don't lie down for 20 minutes    Discussed with Dr. Lemon

## 2022-03-22 NOTE — PROGRESS NOTE ADULT - SUBJECTIVE AND OBJECTIVE BOX
Date of service: 03-22-22 @ 09:11    Patient sitting in chair; no complaints, is tolerating oral food, afebrile        ROS: no fever or chills; denies dizziness, no HA, no SOB or cough, no abdominal pain, no diarrhea or constipation; no dysuria, no urinary frequency, no legs pain, no rashes    MEDICATIONS  (STANDING):  bisacodyl Suppository 10 milliGRAM(s) Rectal <User Schedule>  dextrose 40% Gel 15 Gram(s) Oral once  dextrose 5%. 1000 milliLiter(s) (50 mL/Hr) IV Continuous <Continuous>  dextrose 5%. 1000 milliLiter(s) (100 mL/Hr) IV Continuous <Continuous>  dextrose 50% Injectable 25 Gram(s) IV Push once  dextrose 50% Injectable 12.5 Gram(s) IV Push once  dextrose 50% Injectable 25 Gram(s) IV Push once  doxycycline hyclate Capsule 100 milliGRAM(s) Oral every 12 hours  enoxaparin Injectable 40 milliGRAM(s) SubCutaneous every 24 hours  folic acid 1 milliGRAM(s) Oral daily  glucagon  Injectable 1 milliGRAM(s) IntraMuscular once  insulin glargine Injectable (LANTUS) 8 Unit(s) SubCutaneous every morning  insulin lispro (ADMELOG) corrective regimen sliding scale   SubCutaneous four times a day before meals  methadone    Tablet 25 milliGRAM(s) Oral every 8 hours  multivitamin 1 Tablet(s) Oral daily  pancrelipase (ZENPEP 20,000 Lipase Units) 1 Capsule(s) Oral three times a day with meals  pantoprazole  Injectable 40 milliGRAM(s) IV Push every 12 hours  sodium chloride 0.9% 1000 milliLiter(s) (60 mL/Hr) IV Continuous <Continuous>  thiamine 100 milliGRAM(s) Oral daily    MEDICATIONS  (PRN):  acetaminophen     Tablet .. 650 milliGRAM(s) Oral every 6 hours PRN Temp greater or equal to 38C (100.4F), Mild Pain (1 - 3)  ALBUTerol    90 MICROgram(s) HFA Inhaler 2 Puff(s) Inhalation every 6 hours PRN Bronchospasm  aluminum hydroxide/magnesium hydroxide/simethicone Suspension 30 milliLiter(s) Oral every 4 hours PRN Dyspepsia  melatonin 3 milliGRAM(s) Oral at bedtime PRN Insomnia  morphine   Solution 15 milliGRAM(s) Oral every 4 hours PRN Moderate Pain (4 - 6)  morphine   Solution 20 milliGRAM(s) Oral every 4 hours PRN Severe Pain (7 - 10)  ondansetron Injectable 4 milliGRAM(s) IV Push every 8 hours PRN Nausea and/or Vomiting  zolpidem 5 milliGRAM(s) Oral at bedtime PRN Insomnia      Vital Signs Last 24 Hrs  T(C): 36.6 (21 Mar 2022 21:29), Max: 37 (21 Mar 2022 14:54)  T(F): 97.8 (21 Mar 2022 21:29), Max: 98.6 (21 Mar 2022 14:54)  HR: 73 (21 Mar 2022 21:29) (72 - 73)  BP: 141/86 (21 Mar 2022 21:29) (141/86 - 148/76)  BP(mean): --  RR: 18 (21 Mar 2022 21:29) (16 - 18)  SpO2: 100% (21 Mar 2022 21:29) (100% - 100%)        Physical Exam:          Constitutional: frail looking, cachectic appearing  HEENT: NC/AT, EOMI, PERRLA, temporal wasting  Neck: supple; thyroid not palpable  Back: no tenderness  Respiratory: respiratory effort normal; clear to auscultation  Cardiovascular: S1S2 regular, no murmurs  Abdomen: soft, not tender, not distended, positive BS; no liver or spleen organomegaly, peg in place  Genitourinary: no suprapubic tenderness  Musculoskeletal: no muscle tenderness, no joint swelling or tenderness; right upper extremity picc line removed  Neurological/ Psychiatric: AxOx3, judgement and insight normal;  moving all extremities  Skin: no rashes; no palpable lesions    Labs: all available labs reviewed                 Labs:                        9.5    x     )-----------( x        ( 22 Mar 2022 07:15 )             29.7     03-22    130<L>  |  95<L>  |  13  ----------------------------<  269<H>  4.2   |  33<H>  |  0.50    Ca    8.5      22 Mar 2022 07:15  Phos  2.7     03-22  Mg     1.8     03-22             Cultures:       Culture - Blood (collected 03-17-22 @ 05:04)  Source: .Blood None  Preliminary Report (03-18-22 @ 11:01):    No growth to date.      Ferritin, Serum: 447 ng/mL (03-15-22 @ 07:30)       < from: CT Chest w/ IV Cont (03.17.22 @ 10:35) >    IMPRESSION:    No pulmonary embolism.    Few ill-defined clustered nodules in the right lower lobe, likely related   impacted bronchioles.    3 mm right upper lobe nodule. Recommend follow-up per protocol of primary   pancreatic cancer.    Trace pleural effusions.    Vertebral body erosions and sclerosis abutting the the T6-T7 disc space   and thickening of the paraspinal soft tissues, raising suspicion for   discitis/osteomyelitis.    < end of copied text >      < from: MR Thoracic Spine w/wo IV Cont (03.19.22 @ 12:36) >    ACC: [id number] EXAM:  MR SPINE THORACIC WAW IC                          PROCEDURE DATE:  03/19/2022          INTERPRETATION:  MR thoracic with and without gadolinium    CLINICAL INDICATION:  Pancreatic mass, IVDA  , back pain    TECHNIQUE:   Sagittal and axial T1-weighted images, sagittal STIR images,    and sagittal and axial T2-weighted images of the thoracic spine were   obtained.   Following 7.5 cc of Gadavist administration intravenously/ 0   cc discarded , sagittal and axial T1-weighted fat-saturated images were   obtained.    FINDINGS:   No prior similar studies are available for review.    Acute osteomyelitis/discitis identified at T6-7 with enhancement and   increased signal within the inferior T6 and superior T7 endplates and   with in the intervening disc. Enhancement in the paravertebral soft   tissues at this level and ventral epidural space is noted, LEFT greater   than RIGHT.    Thoracic vertebral alignment is preserved.  Thoracic vertebral body   heights are maintained.  Marrow signal intensity within thoracic   vertebral bodies and posterior elements is significant for marrow edema   at the T6-7 disc endplates.    Thoracic intervertebral discs are otherwise preserved. Tiny LEFT   paracentral disc herniation noted at T8-9 without cord impingement.    The thoracic cord maintains intact morphology.  Thoracic cord signal   intensity is intact.  No abnormal enhancement occurs within the canal.    Atelectasis is seen within the posterior medial lungs.    Cervical spondylosis noted at C4-5 through C6-C7 on the sagittal   T2-weighted localizing sequence.      IMPRESSION:  Acute osteomyelitis/discitis identified at T6-7 with   enhancement and increased signal within the inferior T6 and superior T7   endplates and within the intervening disc. Enhancement in the   paravertebral soft tissues at this level and ventral epidural space is   noted, LEFT greater than RIGHT.    < end of copied text >            Radiology: all available radiological tests reviewed    Advanced directives addressed: full resuscitation

## 2022-03-22 NOTE — PROGRESS NOTE ADULT - ASSESSMENT
Assessment:   56 year old male phx of IVDA with heroin last used 2 months  ago as per patient, opioid dependence on methadone, IDDM, chronic pancreatitis,  and recent significant weight loss over admitted for panreatitis, found to have  pancreatic mass.      Process of Care  --Reviewed dx/treatment problems and alignment with Goals of Care    Physical Aspects of Care  --Pain  convert to PO   give 15mg Methadone today  DC 60mg Daily  START 25mg TID Methadone  PO in AM  c/w breakthrough morphine  monitor QT       --Bowel Regimen  +constipation  risk for constipation d/t immobility and opiates  Daily senna    --Dyspnea  No SOB at this time  comfortable and in NAD    --Nausea Vomiting  denies    --Weakness  PT as tolerated     Psychological and Psychiatric Aspects of Care:   Grief Bereavement emotional support provided  --Hx of psychiatric dx: none  -Pastoral Care Available PRN     Social Aspects of Care  -SW involved     Cultural Aspects  -Primary Language: English    Goals of Care:      currently FULL CODE   GOC  at this time unchanged.       Ethical and Legal Aspects:   NA    Capacity- pt w/ capacity for complex decisions    HCP/Surrogate: Eusebio Pugh (brother)  Code Status- FULL   MOLST- NA  Dispo Plan- dc to snf  Discussed With:  Case coordinated with attending and SW and RN     Time Spent: 45 minutes including the care, coordination and counseling of this patient, 50% of which was spent coordinating and counseling.

## 2022-03-22 NOTE — PROGRESS NOTE ADULT - ASSESSMENT
57 y/o male with PMHX of IVDA with heroin-- last used 2 months ago as per patient, opioid dependence on methadone, IDDM, chronic pancreatitis, and recent significant weight loss over the last 1 year admitted on 3/14 from [facility] in [place] for ERCP, further gi workup. The patient has left upper extremity picc line, had the ERCP done as well as peg tube placed. There is concern for pancreatic cancer, however the calcifications in the pancrease made access difficult for tissue to be obtained. Patient has no appetite, is cachectic and history per medical chart as patient is poor historian.     1. Patient admitted with pancreatitis, incidentally found to have vertebral disciitis on imaging, unclear if this truly represents an active infection  - follow up cultures   - serial cbc and monitor temperature   - reviewed prior medical records to evaluate for resistant or atypical pathogens   - MRI revealed thoracic osteo/disciitis  - day # 5 doxycycline  - tolerating antibiotics without rashes or side effects   - long term antibiotics discussion will need to be had  Will follow

## 2022-03-22 NOTE — PROGRESS NOTE ADULT - CONVERSATION DETAILS
We discuss at length patients underlying clinical diagnosis. We discussed resuscitation and risk/benefits of CPR.  Risks include broken ribs, lung puncture, pain and discomfort.  It means NO CPR would be attempted if patients heart stopped. it would allow at that stage, natural death.          We also discussed mechanical ventilation in the event that they could no longer breathing on their own.  Risk and benefits of mechanical ventilation were discussed at length.   Intubation would also NOT reverse current medical condition- which if progresses naturally would lead to patients death.         He was encouraged to hold this discussion w/ his HCP as well as he never discussed these things with him.

## 2022-03-22 NOTE — PROGRESS NOTE ADULT - SUBJECTIVE AND OBJECTIVE BOX
55 y/o male with PMHX of IVDA with heroin(  last used 2 months ago ) , opioid dependence on methadone, IDDM, chronic pancreatitis, and recent significant weight loss over the last 1 year who was a transfer from [facility] on 3/14/22  for possible ERCP.  Patient initially presented to outside hospital for worsening abdominal pain.  Patient had CT/ MRI/ US of the abdomen which showed chronic pancreatitis, gastric distention (for which he had an NGT),  intra/ extra hepatic biliary ductal dilatation, and fluid collection/ ? mass in the head of the pancreas compressing the SMV and proximal portal vein.  Patient was transferred to  for possible need for ERCP and further GI evaluation.  Upon admission, patient with significantly elevated ALKPHOS 1200.      Hospital course :   3/15/22- events of last night noted. Had coffee-ground emesis and started on Protonix drip. NGT to suction. NPO  3/16 - pt seen and examined, + NGT in place, reports severe abdominal pain, +constipation, denies cp, dyspnea, afebrile  3/17 - no epigastric/low chest pain, retrosternal earlier and trops negative; no further cp palps sob; abdo pain fluctuating   3/18 - no cp palps sob - has back pain and admit to IVDA but states it was more than 2 mos ago, patient ate lunch and has TFs as well   3/19 - no cp palps sob - some abdo distress - he did not tolerate the TFs and he had two episodes of vomiting - TFs now dced - hes eating po   3/20 - no cp palps sob - no n/v now, still with some abdo discomfort;  to resume Tfs today  3/21 - H&H drifting down; tear at GJ site that is bleeding; denies cp palps sob, missed lunch today.   3/22 - no cp palps sob; some abdo fullness after eating - passing gas, to start TFs tonight; no further bleeding from skin tear     PHYSICAL EXAM:  Vital Signs Last 24 Hrs  T(C): 36.8 (22 Mar 2022 15:12), Max: 36.8 (22 Mar 2022 09:18)  T(F): 98.3 (22 Mar 2022 15:12), Max: 98.3 (22 Mar 2022 15:12)  HR: 77 (22 Mar 2022 15:12) (70 - 95)  BP: 149/75 (22 Mar 2022 15:12) (132/82 - 149/75)  RR: 18 (22 Mar 2022 15:12) (18 - 18)  SpO2: 100% (22 Mar 2022 15:12) (99% - 100%)  GENERAL: cachectic,  NAD  HEAD:  Atraumatic, Normocephalic  EYES: EOMI, PERRLA, conjunctiva and sclera clear  NECK: Supple, No JVD  NERVOUS SYSTEM:  Alert & Oriented X2, moves all extremities  CHEST/LUNG: Clear to auscultation bilaterally; No rales, rhonchi, wheezing, or rubs  HEART: Regular rate and rhythm; No murmurs, rubs, or gallops  ABDOMEN: Soft, fullness, +BS, GJ tube present, bleeding resolved   GENITOURINARY- Voiding, no palpable bladder  EXTREMITIES:  2+ Peripheral Pulses, No clubbing, cyanosis, or edema  MUSCULOSKELETAL No muscle tenderness, Muscle tone normal, No joint tenderness, no Joint swelling, Joint range of motion-normal    RADIOLOGY & ADDITIONAL TESTS:  MR MRCP w/wo IV Cont (03.15.22 @ 15:44) >  Pancreatic head mass suspicious for neoplasm with associated biliary and   pancreatic ductal dilatation. Recommend biopsy.  Upper abdominal lymphadenopathy  Distended stomach. Recommend clinical correlation for partial gastric   outlet obstruction    CT ABDOMEN AND PELVIS IC                        IMPRESSION:  Chronic pancreatitis.  Pancreatic ductal dilatation due to multiple intraductal stones.  Biliary ductal dilatation the exact etiology which could be due to post   pancreatic stricture. Consider further evaluation with MRI or endoscopic   ultrasound as an occult pancreatic mass is not excluded.  Cystic pancreatic lesions most likely representing pseudocysts.  Cholelithiasis and marked gallbladder distention, without evidence of   acute cholecystitis.  Mild left lower lobe opacity suspicious for pneumonia.  Enteric tube with tip in the distal esophagus.    < from: MR Thoracic Spine w/wo IV Cont (03.19.22 @ 12:36) >  IMPRESSION:  Acute osteomyelitis/discitis identified at T6-7 with   enhancement and increased signal within the inferior T6 and superior T7   endplates and within the intervening disc. Enhancement in the   paravertebral soft tissues at this level and ventral epidural space is   noted, LEFT greater than RIGHT.    LABS: All Labs Reviewed:                        9.5    x     )-----------( x        ( 22 Mar 2022 07:15 )             29.7   130<L>  |  95<L>  |  13  ----------------------------<  269<H>  4.2   |  33<H>  |  0.50    Ca    8.5      22 Mar 2022 07:15  Phos  2.7     03-22  Mg     1.8     03-22        acetaminophen     Tablet .. 650 milliGRAM(s) Oral every 6 hours PRN  ALBUTerol    90 MICROgram(s) HFA Inhaler 2 Puff(s) Inhalation every 6 hours PRN  aluminum hydroxide/magnesium hydroxide/simethicone Suspension 30 milliLiter(s) Oral every 4 hours PRN  bisacodyl Suppository 10 milliGRAM(s) Rectal <User Schedule>  doxycycline hyclate Capsule 100 milliGRAM(s) Oral every 12 hours  enoxaparin Injectable 40 milliGRAM(s) SubCutaneous every 24 hours  folic acid 1 milliGRAM(s) Oral daily  insulin glargine Injectable (LANTUS) 8 Unit(s) SubCutaneous every morning  insulin lispro (ADMELOG) corrective regimen sliding scale   SubCutaneous four times a day before meals  melatonin 3 milliGRAM(s) Oral at bedtime PRN  methadone    Tablet 25 milliGRAM(s) Oral every 8 hours  morphine   Solution 15 milliGRAM(s) Oral every 4 hours PRN  morphine   Solution 20 milliGRAM(s) Oral every 4 hours PRN  multivitamin 1 Tablet(s) Oral daily  ondansetron Injectable 4 milliGRAM(s) IV Push every 8 hours PRN  pancrelipase (ZENPEP 20,000 Lipase Units) 1 Capsule(s) Oral three times a day with meals  pantoprazole  Injectable 40 milliGRAM(s) IV Push every 12 hours  polyethylene glycol 3350 17 Gram(s) Oral two times a day  sodium chloride 0.9% 1000 milliLiter(s) IV Continuous <Continuous>  thiamine 100 milliGRAM(s) Oral daily  zolpidem 5 milliGRAM(s) Oral at bedtime PRN

## 2022-03-22 NOTE — PROGRESS NOTE ADULT - SUBJECTIVE AND OBJECTIVE BOX
Patient is a 56y old  Male who presents with a chief complaint of abdominal pain-- transfer for possible ERCP (19 Mar 2022 18:09). Follow up for abdominal pain, nutritional status.     Patient seen this am reporting no bowel movement in 3-4 days. Reports intermittent sharp pain in chest/epigastric region not relieved by burping. Denies nausea/vomiting. May be due to doxy?--> advised to take with full glass of water. GJ tube site ANAMIKA no dressing and no further bleeding. Will start bowel regimen and encourage PO intake in addition to GJ feeds.    MEDICATIONS  (STANDING):  bisacodyl Suppository 10 milliGRAM(s) Rectal <User Schedule>  dextrose 40% Gel 15 Gram(s) Oral once  dextrose 5%. 1000 milliLiter(s) IV Continuous <Continuous>  dextrose 5%. 1000 milliLiter(s) IV Continuous <Continuous>  dextrose 50% Injectable 25 Gram(s) IV Push once  dextrose 50% Injectable 12.5 Gram(s) IV Push once  dextrose 50% Injectable 25 Gram(s) IV Push once  doxycycline hyclate Capsule 100 milliGRAM(s) Oral every 12 hours  folic acid 1 milliGRAM(s) Oral daily  glucagon  Injectable 1 milliGRAM(s) IntraMuscular once  insulin lispro (ADMELOG) corrective regimen sliding scale   SubCutaneous four times a day before meals  methadone    Tablet 15 milliGRAM(s) Oral at bedtime  multivitamin 1 Tablet(s) Oral daily  pancrelipase (ZENPEP 20,000 Lipase Units) 1 Capsule(s) Oral three times a day with meals  pantoprazole  Injectable 40 milliGRAM(s) IV Push every 12 hours  thiamine 100 milliGRAM(s) Oral daily    MEDICATIONS  (PRN):  acetaminophen     Tablet .. 650 milliGRAM(s) Oral every 6 hours PRN Temp greater or equal to 38C (100.4F), Mild Pain (1 - 3)  ALBUTerol    90 MICROgram(s) HFA Inhaler 2 Puff(s) Inhalation every 6 hours PRN Bronchospasm  aluminum hydroxide/magnesium hydroxide/simethicone Suspension 30 milliLiter(s) Oral every 4 hours PRN Dyspepsia  melatonin 3 milliGRAM(s) Oral at bedtime PRN Insomnia  morphine   Solution 15 milliGRAM(s) Oral every 4 hours PRN Moderate Pain (4 - 6)  morphine   Solution 20 milliGRAM(s) Oral every 4 hours PRN Severe Pain (7 - 10)  ondansetron Injectable 4 milliGRAM(s) IV Push every 8 hours PRN Nausea and/or Vomiting  zolpidem 5 milliGRAM(s) Oral at bedtime PRN Insomnia    Vital Signs Last 24 Hrs  T(C): 36.8 (03-22-22 @ 09:18), Max: 37 (03-21-22 @ 14:54)  T(F): 98.2 (03-22-22 @ 09:18), Max: 98.6 (03-21-22 @ 14:54)  HR: 95 (03-22-22 @ 09:18) (72 - 95)  BP: 132/82 (03-22-22 @ 09:18) (132/82 - 148/76)  BP(mean): --  RR: 18 (03-22-22 @ 09:18) (16 - 18)  SpO2: 100% (03-22-22 @ 09:18) (100% - 100%)      PHYSICAL EXAM:    Constitutional: No acute distress, chronically ill appearing but  non-toxic appearing, cachectic   HEENT: un-masked, good phonation, not icteric  Neck: supple, no lymphadenopathy  Respiratory: clear to ascultation bilaterally anteriorly, no wheezing  Cardiovascular: S1 and S2, regular rate and rhythm, no murmurs rubs or gallops  Gastrointestinal: soft, non-tender, non-distended, +bowel sounds, no rebound or guarding, no surgical scars, +GJ tube with feeds   Extremities: No peripheral edema, no cyanosis or clubbing  Vascular: 2+ peripheral pulses, no venous stasis  Neurological: A/O x 3, no focal deficits, no asterixis  Psychiatric: Normal mood, normal affect  Skin: No rashes, not jaundiced    LABS:                                              9.5    8.88  )-----------( 432      ( 21 Mar 2022 06:56 )             30.4       03-21    128<L>  |  94<L>  |  16  ----------------------------<  247<H>  4.5   |  30  |  0.47<L>    Ca    8.3<L>      21 Mar 2022 06:56  Phos  2.1     03-20  Mg     1.6     03-21               Patient is a 56y old  Male who presents with a chief complaint of abdominal pain-- transfer for possible ERCP (19 Mar 2022 18:09). Follow up for abdominal pain, nutritional status.     Patient seen this am reporting no bowel movement in 3-4 days. Reports intermittent sharp pain in chest/epigastric region not relieved by burping. Denies nausea/vomiting. May be due to doxy?--> advised to take with full glass of water. GJ tube site ANAMIKA no dressing and no further bleeding. Will start bowel regimen and encourage PO intake in addition to GJ feeds.    MEDICATIONS  (STANDING):  bisacodyl Suppository 10 milliGRAM(s) Rectal <User Schedule>  dextrose 40% Gel 15 Gram(s) Oral once  dextrose 5%. 1000 milliLiter(s) IV Continuous <Continuous>  dextrose 5%. 1000 milliLiter(s) IV Continuous <Continuous>  dextrose 50% Injectable 25 Gram(s) IV Push once  dextrose 50% Injectable 12.5 Gram(s) IV Push once  dextrose 50% Injectable 25 Gram(s) IV Push once  doxycycline hyclate Capsule 100 milliGRAM(s) Oral every 12 hours  folic acid 1 milliGRAM(s) Oral daily  glucagon  Injectable 1 milliGRAM(s) IntraMuscular once  insulin lispro (ADMELOG) corrective regimen sliding scale   SubCutaneous four times a day before meals  methadone    Tablet 15 milliGRAM(s) Oral at bedtime  multivitamin 1 Tablet(s) Oral daily  pancrelipase (ZENPEP 20,000 Lipase Units) 1 Capsule(s) Oral three times a day with meals  pantoprazole  Injectable 40 milliGRAM(s) IV Push every 12 hours  thiamine 100 milliGRAM(s) Oral daily    MEDICATIONS  (PRN):  acetaminophen     Tablet .. 650 milliGRAM(s) Oral every 6 hours PRN Temp greater or equal to 38C (100.4F), Mild Pain (1 - 3)  ALBUTerol    90 MICROgram(s) HFA Inhaler 2 Puff(s) Inhalation every 6 hours PRN Bronchospasm  aluminum hydroxide/magnesium hydroxide/simethicone Suspension 30 milliLiter(s) Oral every 4 hours PRN Dyspepsia  melatonin 3 milliGRAM(s) Oral at bedtime PRN Insomnia  morphine   Solution 15 milliGRAM(s) Oral every 4 hours PRN Moderate Pain (4 - 6)  morphine   Solution 20 milliGRAM(s) Oral every 4 hours PRN Severe Pain (7 - 10)  ondansetron Injectable 4 milliGRAM(s) IV Push every 8 hours PRN Nausea and/or Vomiting  zolpidem 5 milliGRAM(s) Oral at bedtime PRN Insomnia    Vital Signs Last 24 Hrs  T(C): 36.8 (03-22-22 @ 09:18), Max: 37 (03-21-22 @ 14:54)  T(F): 98.2 (03-22-22 @ 09:18), Max: 98.6 (03-21-22 @ 14:54)  HR: 95 (03-22-22 @ 09:18) (72 - 95)  BP: 132/82 (03-22-22 @ 09:18) (132/82 - 148/76)  BP(mean): --  RR: 18 (03-22-22 @ 09:18) (16 - 18)  SpO2: 100% (03-22-22 @ 09:18) (100% - 100%)    REVIEW OF SYSTEMS:    CONSTITUTIONAL: No weakness, fevers or chills  EYES/ENT: No visual changes;  No vertigo or throat pain   NECK: No pain or stiffness  RESPIRATORY: No cough, wheezing, hemoptysis; No shortness of breath  CARDIOVASCULAR: No chest pain or palpitations  GASTROINTESTINAL: see hpi  GENITOURINARY: No dysuria, frequency or hematuria  NEUROLOGICAL: No numbness or weakness  SKIN: No itching, burning, rashes, or lesions   PSYCH: Normal mood and affect  All other review of systems is negative unless indicated above.    PHYSICAL EXAM:    Constitutional: No acute distress, chronically ill appearing but  non-toxic appearing, cachectic   HEENT: un-masked, good phonation, not icteric  Neck: supple, no lymphadenopathy  Respiratory: clear to ascultation bilaterally anteriorly, no wheezing  Cardiovascular: S1 and S2, regular rate and rhythm, no murmurs rubs or gallops  Gastrointestinal: soft, non-tender, non-distended, +bowel sounds, no rebound or guarding, no surgical scars, +GJ tube with feeds   Extremities: No peripheral edema, no cyanosis or clubbing  Vascular: 2+ peripheral pulses, no venous stasis  Neurological: A/O x 3, no focal deficits, no asterixis  Psychiatric: Normal mood, normal affect  Skin: No rashes, not jaundiced    LABS:                        9.5    x     )-----------( x        ( 22 Mar 2022 07:15 )             29.7       03-22    130<L>  |  95<L>  |  13  ----------------------------<  269<H>  4.2   |  33<H>  |  0.50    Ca    8.5      22 Mar 2022 07:15  Phos  2.7     03-22  Mg     1.8     03-22                                                        Patient is a 56y old  Male who presents with a chief complaint of abdominal pain-- transfer for possible ERCP (19 Mar 2022 18:09). Follow up for abdominal pain, nutritional status.     Patient seen this am reporting no bowel movement in 3-4 days. Reports intermittent sharp pain in chest/epigastric region not relieved by burping. Denies nausea/vomiting. May be due to doxy?-->    MEDICATIONS  (STANDING):  bisacodyl Suppository 10 milliGRAM(s) Rectal <User Schedule>  dextrose 40% Gel 15 Gram(s) Oral once  dextrose 5%. 1000 milliLiter(s) IV Continuous <Continuous>  dextrose 5%. 1000 milliLiter(s) IV Continuous <Continuous>  dextrose 50% Injectable 25 Gram(s) IV Push once  dextrose 50% Injectable 12.5 Gram(s) IV Push once  dextrose 50% Injectable 25 Gram(s) IV Push once  doxycycline hyclate Capsule 100 milliGRAM(s) Oral every 12 hours  folic acid 1 milliGRAM(s) Oral daily  glucagon  Injectable 1 milliGRAM(s) IntraMuscular once  insulin lispro (ADMELOG) corrective regimen sliding scale   SubCutaneous four times a day before meals  methadone    Tablet 15 milliGRAM(s) Oral at bedtime  multivitamin 1 Tablet(s) Oral daily  pancrelipase (ZENPEP 20,000 Lipase Units) 1 Capsule(s) Oral three times a day with meals  pantoprazole  Injectable 40 milliGRAM(s) IV Push every 12 hours  thiamine 100 milliGRAM(s) Oral daily    MEDICATIONS  (PRN):  acetaminophen     Tablet .. 650 milliGRAM(s) Oral every 6 hours PRN Temp greater or equal to 38C (100.4F), Mild Pain (1 - 3)  ALBUTerol    90 MICROgram(s) HFA Inhaler 2 Puff(s) Inhalation every 6 hours PRN Bronchospasm  aluminum hydroxide/magnesium hydroxide/simethicone Suspension 30 milliLiter(s) Oral every 4 hours PRN Dyspepsia  melatonin 3 milliGRAM(s) Oral at bedtime PRN Insomnia  morphine   Solution 15 milliGRAM(s) Oral every 4 hours PRN Moderate Pain (4 - 6)  morphine   Solution 20 milliGRAM(s) Oral every 4 hours PRN Severe Pain (7 - 10)  ondansetron Injectable 4 milliGRAM(s) IV Push every 8 hours PRN Nausea and/or Vomiting  zolpidem 5 milliGRAM(s) Oral at bedtime PRN Insomnia    Vital Signs Last 24 Hrs  T(C): 36.8 (03-22-22 @ 09:18), Max: 37 (03-21-22 @ 14:54)  T(F): 98.2 (03-22-22 @ 09:18), Max: 98.6 (03-21-22 @ 14:54)  HR: 95 (03-22-22 @ 09:18) (72 - 95)  BP: 132/82 (03-22-22 @ 09:18) (132/82 - 148/76)  BP(mean): --  RR: 18 (03-22-22 @ 09:18) (16 - 18)  SpO2: 100% (03-22-22 @ 09:18) (100% - 100%)    PHYSICAL EXAM:    Constitutional: No acute distress, chronically ill appearing but  non-toxic appearing, cachectic   HEENT: un-masked, good phonation, not icteric  Neck: supple, no lymphadenopathy  Respiratory: clear to ascultation bilaterally anteriorly, no wheezing  Cardiovascular: S1 and S2, regular rate and rhythm, no murmurs rubs or gallops  Gastrointestinal: soft, non-tender, non-distended, +bowel sounds, no rebound or guarding, no surgical scars, +GJ tube with feeds, no bleeding at the site of G tube (tear was repaired)  Extremities: No peripheral edema, no cyanosis or clubbing  Vascular: 2+ peripheral pulses, no venous stasis  Neurological: A/O x 3, no focal deficits, no asterixis  Psychiatric: Normal mood, normal affect  Skin: No rashes, not jaundiced    LABS:                        9.5    x     )-----------( x        ( 22 Mar 2022 07:15 )             29.7       03-22    130<L>  |  95<L>  |  13  ----------------------------<  269<H>  4.2   |  33<H>  |  0.50    Ca    8.5      22 Mar 2022 07:15  Phos  2.7     03-22  Mg     1.8     03-22

## 2022-03-22 NOTE — PROGRESS NOTE ADULT - ASSESSMENT
55 y/o male with PMHX of IVDA with heroin , opioid dependence on methadone, IDDM, chronic pancreatitis, abnormal weight loss over the last 1 year who was a transfer from [facility] ([place] on 3/14/22  for possible ERCP.  Patient initially presented to outside hospital for worsening abdominal pain.  Patient had CT/ MRI/ US of the abdomen which showed chronic pancreatitis, gastric distention (for which he had an NGT),  intra/ extra hepatic biliary ductal dilatation, and fluid collection/  mass in the head of the pancreas compressing the SMV and proximal portal vein.    Upon admission, patient with significantly elevated ALK PHOS 1200.      Epigastric abdominal pain due to   Pancreatic mass,  associated with biliary and pancreatic duct dilatation , pancreatic stones with underlying chronic pancreatitis s/p ERCP, EUS s/p FNA  Partial gastric outlet obstruction due to severe duodenal inflammation s/p NGT now s/p GJ tube   Cholelithiasis and marked gallbladder distention without acute cholecystitis  Elevated CA 19-9   - 3/16/22 - s/p EUS and FNA of the pancreatic mass , ERCP, pancreatic sphincterotomy , s/p pancreatic stent , s/p GJ tube   - s/p GJ tube- resume TFs with recreational PO feeds   - watch for refeeding syndrome; replace Mg/Phos PRN   - complete workup with tumor markers/CT chest/Heme-Onc Cx - results noted - no mets to chest/follow-up biopsy results     Hematemesis 3/15/22 Acute blood loss anemia due to upper GI bleeding   Iron deficiency   - s/p lovenox 40 3/14  - d/sherice   - Cont Protonix drip- change to IVP   - s/p  2 Units PRBC  - SKIN TEAR at GJ site : causing the anemia   - above repaired, H&H stable, no further bleeding resume TFs tonight as tolerated     Mild leukocytosis likely due to malignancy  LLL opacity suspected Pneumonia possible aspiration  On CT abd there was suspicion of  LLL PNA and was on ZOsyn  CT chest does not show any consolidation - therefore there is no pneumonia and will stop Zosyn  There is possible discitis on CT imaging and will get MRI to r/o DISCITIS/OM vs Metastatic lesion   Discussed with DR Lewis and started on Doxy for possible discitis - MRI T SPINE confirms this   Patient has a MIDLINE (he came with it)  and plan to  dc especially in light of IVDA - this fell off today 3/19    DM type2 with A1C 9.9 with hypoglycemic episode on 3/15   on PO Diet, resume low dose lantus and titrate up as tolerated  resume TFs today also     Abnormal Weight Loss   Severe Protein Calorie Malnutrition/ Cachexia      Severe hypoalbuminemia  Hyponatremia 2/2 poor po intake   resume IVFs     IVDA/ Opioid Dependence  - Patient was on methadone 60 mg daily, discussed with [facility] Dr Hernandez - will place on methadone 25 TID.    - can get morphine po as needed     DVT Proph:  resume LOVENOX 40 tmr if H&H stable ; monitor CBC closely     DISPO - on PO and to resume TFs today; MRI spine with OM, midline fell off; placed on low dose of lantus and will titrate up  if no further emesis

## 2022-03-22 NOTE — PROGRESS NOTE ADULT - SUBJECTIVE AND OBJECTIVE BOX
HPI:    Mr. Pugh is a 56 year old male phx of IVDA with heroin last used 2 months  ago as per patient, opioid dependence on methadone (states for 27 years??), IDDM, chronic pancreatitis, and recent significant weight loss over admitted for panreatitis, found to have pancreatic mass.     3/16/22  Pt seen and examined bed side, NGT inplace to suciton. Pt in signficant pain this morning last dose of Morphine 4mg was at 7am and I saw him closer to 11.  He states the 4mg had little to no effect.  We discussed options for pain managment now and then possibly moving forward if symptoms do not alleviate.     He is frustrated that he's so sick, he wishes he could pull the NGT and go home.  He has some underlying diagnosis of anxiety/depression he states -- he doesn't know what psych meds he's on and doesn't have his cellphone to give us contact info on outpatient psych.      As per SW note:  Hx of Patient's residence is in the [place], was discharged from  [facility] on 2/22/2022 to [facility] in [place] for ASHLEY. Brother is  Eusebio .     Patient told me that Eusebio would be his HCP if he's unable to make any medical decisions.       3/17/22  pt seen and examined  pain is better controlled "not perfect" but better.  He c/o hunger but remains NPO at this time  on exam he does appear more comfortable and is even more conversive now w/ his pain control.       3/18/22  pt seen and examined  c/o sputum  but no sob or cp at this time  no nausea or vomiting  Used 3 doses of breakthrough morphine yesterday       3/21  pt reports frequent use of  morphine (80mg PO morphine in 24 hours + 60mg Methadone standing d/t hx of IVDA)  case d/w patient and team.   Dose 15mg tonight of Methadone   Tomorrow DC 60mg and Start 25mg Methadone TID    Pt denies nausea vomiting shortness of breath.     3/22  pt still somewhat uncomfortable  PO changes were started at this time breakthrough is held.       PAIN: (x )Yes   ( )No  Level: severe  Location: abdominal pain  Intensity:    8/10  Quality: aching and sharp  Aggravating Factors: movement  Alleviating Factors: pain meds  Radiation: back  Duration/Timing: constant  Impact on ADLs: significant    DYSPNEA: ( ) Yes  (x ) No  Level:      Review of Systems:    Anxiety- + hx   Depression- + hx  Physical Discomfort- still has discomfort better than admission  Dyspnea- none   Constipation- none  Nausea- ++   Vomiting- not at this time  Anorexia- pt hungry today  Weight Loss- significant   Cough- none  Secretions- none  Fatigue- +  insomnia  Weakness- ++  Delirium- none    All other systems reviewed and negative        PHYSICAL EXAM:    Vital Signs Last 24 Hrs  T(C): 36.8 (22 Mar 2022 15:12), Max: 36.8 (22 Mar 2022 09:18)  T(F): 98.3 (22 Mar 2022 15:12), Max: 98.3 (22 Mar 2022 15:12)  HR: 77 (22 Mar 2022 15:12) (70 - 95)  BP: 149/75 (22 Mar 2022 15:12) (132/82 - 149/75)  BP(mean): --  RR: 18 (22 Mar 2022 15:12) (18 - 18)  SpO2: 100% (22 Mar 2022 15:12) (99% - 100%)  Daily     Daily       PPSV2:30   %  FAST:    General: cachectic, severe frailty  Mental Status: a+Ox3  HEENT: EOMI PERRL  Lungs: ctabl bl    GI: abdomen soft slight tender  : voids  Ext: moves all 4 extremities spontaneously overall generalized weakness  Neuro: follows commands        LABS:                        9.5    x     )-----------( x        ( 22 Mar 2022 07:15 )             29.7     03-22    130<L>  |  95<L>  |  13  ----------------------------<  269<H>  4.2   |  33<H>  |  0.50    Ca    8.5      22 Mar 2022 07:15  Phos  2.7     03-22  Mg     1.8     03-22        Albumin: Albumin, Serum: 1.4 g/dL (03-18 @ 05:04)      Allergies    No Known Allergies    Intolerances      MEDICATIONS  (STANDING):  bisacodyl Suppository 10 milliGRAM(s) Rectal <User Schedule>  dextrose 40% Gel 15 Gram(s) Oral once  dextrose 5%. 1000 milliLiter(s) (50 mL/Hr) IV Continuous <Continuous>  dextrose 5%. 1000 milliLiter(s) (100 mL/Hr) IV Continuous <Continuous>  dextrose 50% Injectable 25 Gram(s) IV Push once  dextrose 50% Injectable 12.5 Gram(s) IV Push once  dextrose 50% Injectable 25 Gram(s) IV Push once  doxycycline hyclate Capsule 100 milliGRAM(s) Oral every 12 hours  enoxaparin Injectable 40 milliGRAM(s) SubCutaneous every 24 hours  folic acid 1 milliGRAM(s) Oral daily  glucagon  Injectable 1 milliGRAM(s) IntraMuscular once  insulin glargine Injectable (LANTUS) 8 Unit(s) SubCutaneous every morning  insulin lispro (ADMELOG) corrective regimen sliding scale   SubCutaneous four times a day before meals  methadone    Tablet 25 milliGRAM(s) Oral every 8 hours  multivitamin 1 Tablet(s) Oral daily  pancrelipase (ZENPEP 20,000 Lipase Units) 1 Capsule(s) Oral three times a day with meals  pantoprazole  Injectable 40 milliGRAM(s) IV Push every 12 hours  polyethylene glycol 3350 17 Gram(s) Oral two times a day  sodium chloride 0.9% 1000 milliLiter(s) (60 mL/Hr) IV Continuous <Continuous>  thiamine 100 milliGRAM(s) Oral daily    MEDICATIONS  (PRN):  acetaminophen     Tablet .. 650 milliGRAM(s) Oral every 6 hours PRN Temp greater or equal to 38C (100.4F), Mild Pain (1 - 3)  ALBUTerol    90 MICROgram(s) HFA Inhaler 2 Puff(s) Inhalation every 6 hours PRN Bronchospasm  aluminum hydroxide/magnesium hydroxide/simethicone Suspension 30 milliLiter(s) Oral every 4 hours PRN Dyspepsia  melatonin 3 milliGRAM(s) Oral at bedtime PRN Insomnia  morphine   Solution 15 milliGRAM(s) Oral every 4 hours PRN Moderate Pain (4 - 6)  morphine   Solution 20 milliGRAM(s) Oral every 4 hours PRN Severe Pain (7 - 10)  ondansetron Injectable 4 milliGRAM(s) IV Push every 8 hours PRN Nausea and/or Vomiting  zolpidem 5 milliGRAM(s) Oral at bedtime PRN Insomnia      RADIOLOGY:

## 2022-03-22 NOTE — PROGRESS NOTE ADULT - ATTENDING COMMENTS
56 year old man polysubstance abuse with severe chronic pancreatitis and pain, malnutrition, s/p EUS FNA (likely negative for cancer) and GJ tube placement, as well as a small stent into a spontaneously fistulizing pseudocyst, doing well.     Continue J tube feeds.   G tube for meds/decompression if needed.   Pt ok to eat even with GJ tube in place. Advance diet as tolerated.
56 year old man with severe chronic pancreatitis now s/p GJ tube, with bleeding at gastrostomy site.     I cleaned the bumper and lifted it, there is a tear at the skin underneath the bumper that it oozing. The bleeding is not coming from inside the gastrostomy tract, it is a skin tear, that's why it's bleeding despite cinching the tube. Ok to feed patient and to have the feeds running.   I spoke to surgical team at the bedside, they will come clean/numb/suture the site.
I have seen and evaluated this patient and I agree with the residents findings and plan.
56 year old man transferred with pain and concern for gastric outlet obstruction due to chronic pancreatitis, dilated ducts/PD stones.     MRI/MRCP very concerning for mass lesion.     Plan for EUS FNA, possible G-J tube placement/duodenal stent, ERCP.
(as documented by the NP)    56 year old man with failure to thrive, chronic pancreatitis, s/p GJ tube.     Fine needle biopsy showed benign disease.   Continue J feeds.   Continue PO intake  The bleeding from GJ tube stopped  We should start to think about discharge planning

## 2022-03-23 LAB
ANION GAP SERPL CALC-SCNC: 3 MMOL/L — LOW (ref 5–17)
BUN SERPL-MCNC: 13 MG/DL — SIGNIFICANT CHANGE UP (ref 7–23)
CALCIUM SERPL-MCNC: 8.5 MG/DL — SIGNIFICANT CHANGE UP (ref 8.5–10.1)
CHLORIDE SERPL-SCNC: 98 MMOL/L — SIGNIFICANT CHANGE UP (ref 96–108)
CO2 SERPL-SCNC: 31 MMOL/L — SIGNIFICANT CHANGE UP (ref 22–31)
CREAT SERPL-MCNC: 0.41 MG/DL — LOW (ref 0.5–1.3)
EGFR: 128 ML/MIN/1.73M2 — SIGNIFICANT CHANGE UP
GLUCOSE SERPL-MCNC: 251 MG/DL — HIGH (ref 70–99)
HCT VFR BLD CALC: 29.2 % — LOW (ref 39–50)
HGB BLD-MCNC: 9.4 G/DL — LOW (ref 13–17)
MAGNESIUM SERPL-MCNC: 1.8 MG/DL — SIGNIFICANT CHANGE UP (ref 1.6–2.6)
MCHC RBC-ENTMCNC: 27.1 PG — SIGNIFICANT CHANGE UP (ref 27–34)
MCHC RBC-ENTMCNC: 32.2 GM/DL — SIGNIFICANT CHANGE UP (ref 32–36)
MCV RBC AUTO: 84.1 FL — SIGNIFICANT CHANGE UP (ref 80–100)
PHOSPHATE SERPL-MCNC: 2.3 MG/DL — LOW (ref 2.5–4.5)
PLATELET # BLD AUTO: 410 K/UL — HIGH (ref 150–400)
POTASSIUM SERPL-MCNC: 4.4 MMOL/L — SIGNIFICANT CHANGE UP (ref 3.5–5.3)
POTASSIUM SERPL-SCNC: 4.4 MMOL/L — SIGNIFICANT CHANGE UP (ref 3.5–5.3)
RBC # BLD: 3.47 M/UL — LOW (ref 4.2–5.8)
RBC # FLD: 16.6 % — HIGH (ref 10.3–14.5)
SARS-COV-2 RNA SPEC QL NAA+PROBE: SIGNIFICANT CHANGE UP
SODIUM SERPL-SCNC: 132 MMOL/L — LOW (ref 135–145)
WBC # BLD: 8.38 K/UL — SIGNIFICANT CHANGE UP (ref 3.8–10.5)
WBC # FLD AUTO: 8.38 K/UL — SIGNIFICANT CHANGE UP (ref 3.8–10.5)

## 2022-03-23 PROCEDURE — 99232 SBSQ HOSP IP/OBS MODERATE 35: CPT

## 2022-03-23 PROCEDURE — 99233 SBSQ HOSP IP/OBS HIGH 50: CPT

## 2022-03-23 RX ADMIN — METHADONE HYDROCHLORIDE 25 MILLIGRAM(S): 40 TABLET ORAL at 21:27

## 2022-03-23 RX ADMIN — Medication 1 CAPSULE(S): at 11:44

## 2022-03-23 RX ADMIN — METHADONE HYDROCHLORIDE 25 MILLIGRAM(S): 40 TABLET ORAL at 14:34

## 2022-03-23 RX ADMIN — PANTOPRAZOLE SODIUM 40 MILLIGRAM(S): 20 TABLET, DELAYED RELEASE ORAL at 21:28

## 2022-03-23 RX ADMIN — Medication 1 CAPSULE(S): at 08:47

## 2022-03-23 RX ADMIN — Medication 1 MILLIGRAM(S): at 11:12

## 2022-03-23 RX ADMIN — Medication 4: at 08:01

## 2022-03-23 RX ADMIN — INSULIN GLARGINE 8 UNIT(S): 100 INJECTION, SOLUTION SUBCUTANEOUS at 08:01

## 2022-03-23 RX ADMIN — Medication 100 MILLIGRAM(S): at 21:28

## 2022-03-23 RX ADMIN — Medication 1 CAPSULE(S): at 18:41

## 2022-03-23 RX ADMIN — Medication 100 MILLIGRAM(S): at 11:12

## 2022-03-23 RX ADMIN — ZOLPIDEM TARTRATE 5 MILLIGRAM(S): 10 TABLET ORAL at 22:44

## 2022-03-23 RX ADMIN — PANTOPRAZOLE SODIUM 40 MILLIGRAM(S): 20 TABLET, DELAYED RELEASE ORAL at 11:12

## 2022-03-23 RX ADMIN — MORPHINE SULFATE 20 MILLIGRAM(S): 50 CAPSULE, EXTENDED RELEASE ORAL at 06:52

## 2022-03-23 RX ADMIN — MORPHINE SULFATE 20 MILLIGRAM(S): 50 CAPSULE, EXTENDED RELEASE ORAL at 11:44

## 2022-03-23 RX ADMIN — METHADONE HYDROCHLORIDE 25 MILLIGRAM(S): 40 TABLET ORAL at 06:31

## 2022-03-23 RX ADMIN — MORPHINE SULFATE 20 MILLIGRAM(S): 50 CAPSULE, EXTENDED RELEASE ORAL at 20:07

## 2022-03-23 RX ADMIN — MORPHINE SULFATE 20 MILLIGRAM(S): 50 CAPSULE, EXTENDED RELEASE ORAL at 02:09

## 2022-03-23 RX ADMIN — ONDANSETRON 4 MILLIGRAM(S): 8 TABLET, FILM COATED ORAL at 14:40

## 2022-03-23 RX ADMIN — MORPHINE SULFATE 20 MILLIGRAM(S): 50 CAPSULE, EXTENDED RELEASE ORAL at 06:12

## 2022-03-23 RX ADMIN — MORPHINE SULFATE 20 MILLIGRAM(S): 50 CAPSULE, EXTENDED RELEASE ORAL at 21:07

## 2022-03-23 RX ADMIN — ENOXAPARIN SODIUM 40 MILLIGRAM(S): 100 INJECTION SUBCUTANEOUS at 11:11

## 2022-03-23 RX ADMIN — Medication 1 TABLET(S): at 11:12

## 2022-03-23 RX ADMIN — MORPHINE SULFATE 20 MILLIGRAM(S): 50 CAPSULE, EXTENDED RELEASE ORAL at 02:49

## 2022-03-23 NOTE — PROGRESS NOTE ADULT - ASSESSMENT
57 y/o male with PMHX of IVDA with heroin , opioid dependence on methadone, IDDM, chronic pancreatitis, abnormal weight loss over the last 1 year who was a transfer from [facility] ([place]) on 3/14/22  for possible ERCP.  Patient initially presented to outside hospital for worsening abdominal pain.  Patient had CT/ MRI/ US of the abdomen which showed chronic pancreatitis, gastric distention (for which he had an NGT),  intra/ extra hepatic biliary ductal dilatation, and fluid collection/  mass in the head of the pancreas compressing the SMV and proximal portal vein.    Upon admission, patient with significantly elevated ALK PHOS 1200.      Epigastric abdominal pain due to   Pancreatic mass,  associated with biliary and pancreatic duct dilatation , pancreatic stones with underlying chronic pancreatitis s/p ERCP, EUS s/p FNA  Partial gastric outlet obstruction due to severe duodenal inflammation s/p NGT now s/p GJ tube   Cholelithiasis and marked gallbladder distention without acute cholecystitis  Elevated CA 19-9   - 3/16/22 - s/p EUS and FNA of the pancreatic mass , ERCP, pancreatic sphincterotomy , s/p pancreatic stent , s/p GJ tube   - s/p GJ tube- resume TFs with recreational PO feeds   - watch for refeeding syndrome; replace Mg/Phos PRN   - complete workup with tumor markers/CT chest/Heme-Onc Cx - results noted - no mets to chest/follow-up biopsy results     Hematemesis 3/15/22 Acute blood loss anemia due to upper GI bleeding   Iron deficiency   - s/p lovenox 40 3/14  - d/sherice   - Cont Protonix drip- change to IVP   - s/p  2 Units PRBC  - SKIN TEAR at GJ site : causing the anemia   - above repaired, H&H stable, no further bleeding  - pt continues to have intermittent nausea - will hold TFs tonight - plan for one bolus during the day tomorrow     Mild leukocytosis likely due to malignancy  LLL opacity suspected Pneumonia possible aspiration  On CT abd there was suspicion of  LLL PNA and was on ZOsyn  CT chest does not show any consolidation - therefore there is no pneumonia and will stop Zosyn  There is possible discitis on CT imaging and will get MRI to r/o DISCITIS/OM vs Metastatic lesion   Discussed with DR Lewis and started on Doxy for possible discitis - MRI T SPINE confirms this   Patient has a MIDLINE (he came with it)  and plan to  dc especially in light of IVDA - this fell off on 3/19    DM type2 with A1C 9.9 with hypoglycemic episode on 3/15   on PO Diet, resume low dose lantus and titrate up as tolerated  hold TFs tonight as patient nauseous and abdo full after eating today     Abnormal Weight Loss   Severe Protein Calorie Malnutrition/ Cachexia      Severe hypoalbuminemia  Hyponatremia 2/2 poor po intake   resume IVFs     IVDA/ Opioid Dependence  - Patient was on methadone 60 mg daily, discussed with [facility] Dr Hernandez - will place on methadone 25 TID.    - can get morphine po as needed     DVT Proph:  resume LOVENOX 40     DISPO - on PO and to hold TFs today as patient became naseous toward the end of the day   plan for a daily bolus on discharge; MRI spine with OM, midline fell off;   placed on low dose of lantus and will titrate up  if no further emesis

## 2022-03-23 NOTE — PROGRESS NOTE ADULT - SUBJECTIVE AND OBJECTIVE BOX
57 y/o male with PMHX of IVDA with heroin(  last used 2 months ago ) , opioid dependence on methadone, IDDM, chronic pancreatitis, and recent significant weight loss over the last 1 year who was a transfer from [facility] on 3/14/22  for possible ERCP.  Patient initially presented to outside hospital for worsening abdominal pain.  Patient had CT/ MRI/ US of the abdomen which showed chronic pancreatitis, gastric distention (for which he had an NGT),  intra/ extra hepatic biliary ductal dilatation, and fluid collection/ ? mass in the head of the pancreas compressing the SMV and proximal portal vein.  Patient was transferred to  for possible need for ERCP and further GI evaluation.  Upon admission, patient with significantly elevated ALKPHOS 1200.      Hospital course :   3/23 - no cp palps sob; some abdo fullness after eating - felt nauseous today, discussed with RN - will hold TFs tonight     PHYSICAL EXAM:  Vital Signs Last 24 Hrs  T(F): 98.3 (23 Mar 2022 15:01), Max: 98.3 (23 Mar 2022 15:01)  HR: 73 (23 Mar 2022 15:01) (72 - 83)  BP: 137/73 (23 Mar 2022 15:01) (129/76 - 155/81)  RR: 18 (23 Mar 2022 15:01) (18 - 18)  SpO2: 100% (23 Mar 2022 15:01) (100% - 100%)  GENERAL: cachectic,  NAD  HEAD:  Atraumatic, Normocephalic  EYES: EOMI, PERRLA, conjunctiva and sclera clear  NECK: Supple, No JVD  NERVOUS SYSTEM:  Alert & Oriented X2, moves all extremities  CHEST/LUNG: Clear to auscultation bilaterally; No rales, rhonchi, wheezing, or rubs  HEART: Regular rate and rhythm; No murmurs, rubs, or gallops  ABDOMEN: Soft, fullness, +BS, GJ tube present, bleeding resolved   GENITOURINARY- Voiding, no palpable bladder  EXTREMITIES:  2+ Peripheral Pulses, No clubbing, cyanosis, or edema  MUSCULOSKELETAL No muscle tenderness, Muscle tone normal, No joint tenderness, no Joint swelling, Joint range of motion-normal    RADIOLOGY & ADDITIONAL TESTS:  MR MRCP w/wo IV Cont (03.15.22 @ 15:44) >  Pancreatic head mass suspicious for neoplasm with associated biliary and   pancreatic ductal dilatation. Recommend biopsy.  Upper abdominal lymphadenopathy  Distended stomach. Recommend clinical correlation for partial gastric   outlet obstruction    CT ABDOMEN AND PELVIS IC                        IMPRESSION:  Chronic pancreatitis.  Pancreatic ductal dilatation due to multiple intraductal stones.  Biliary ductal dilatation the exact etiology which could be due to post   pancreatic stricture. Consider further evaluation with MRI or endoscopic   ultrasound as an occult pancreatic mass is not excluded.  Cystic pancreatic lesions most likely representing pseudocysts.  Cholelithiasis and marked gallbladder distention, without evidence of   acute cholecystitis.  Mild left lower lobe opacity suspicious for pneumonia.  Enteric tube with tip in the distal esophagus.    < from: MR Thoracic Spine w/wo IV Cont (03.19.22 @ 12:36) >  IMPRESSION:  Acute osteomyelitis/discitis identified at T6-7 with   enhancement and increased signal within the inferior T6 and superior T7   endplates and within the intervening disc. Enhancement in the   paravertebral soft tissues at this level and ventral epidural space is   noted, LEFT greater than RIGHT.    LABS: All Labs Reviewed:                        9.4    8.38  )-----------( 410      ( 23 Mar 2022 06:57 )             29.2     132<L>  |  98  |  13  ----------------------------<  251<H>  4.4   |  31  |  0.41<L>    Ca    8.5      23 Mar 2022 06:57  Phos  2.3     03-23  Mg     1.8     03-23        acetaminophen     Tablet .. 650 milliGRAM(s) Oral every 6 hours PRN  ALBUTerol    90 MICROgram(s) HFA Inhaler 2 Puff(s) Inhalation every 6 hours PRN  aluminum hydroxide/magnesium hydroxide/simethicone Suspension 30 milliLiter(s) Oral every 4 hours PRN  bisacodyl Suppository 10 milliGRAM(s) Rectal <User Schedule>  doxycycline hyclate Capsule 100 milliGRAM(s) Oral every 12 hours  enoxaparin Injectable 40 milliGRAM(s) SubCutaneous every 24 hours  folic acid 1 milliGRAM(s) Oral daily  glucagon  Injectable 1 milliGRAM(s) IntraMuscular once  insulin glargine Injectable (LANTUS) 8 Unit(s) SubCutaneous every morning  insulin lispro (ADMELOG) corrective regimen sliding scale   SubCutaneous four times a day before meals  melatonin 3 milliGRAM(s) Oral at bedtime PRN  methadone    Tablet 25 milliGRAM(s) Oral every 8 hours  morphine   Solution 15 milliGRAM(s) Oral every 4 hours PRN  morphine   Solution 20 milliGRAM(s) Oral every 4 hours PRN  multivitamin 1 Tablet(s) Oral daily  ondansetron Injectable 4 milliGRAM(s) IV Push every 8 hours PRN  pancrelipase (ZENPEP 20,000 Lipase Units) 1 Capsule(s) Oral three times a day with meals  pantoprazole  Injectable 40 milliGRAM(s) IV Push every 12 hours  polyethylene glycol 3350 17 Gram(s) Oral two times a day  thiamine 100 milliGRAM(s) Oral daily  zolpidem 5 milliGRAM(s) Oral at bedtime PRN

## 2022-03-23 NOTE — PROGRESS NOTE ADULT - SUBJECTIVE AND OBJECTIVE BOX
Date of service: 03-23-22 @ 10:05      Patient lying in bed; afebrile      ROS unable to obtain secondary to patient medical condition     MEDICATIONS  (STANDING):  bisacodyl Suppository 10 milliGRAM(s) Rectal <User Schedule>  dextrose 40% Gel 15 Gram(s) Oral once  dextrose 5%. 1000 milliLiter(s) (50 mL/Hr) IV Continuous <Continuous>  dextrose 5%. 1000 milliLiter(s) (100 mL/Hr) IV Continuous <Continuous>  dextrose 50% Injectable 25 Gram(s) IV Push once  dextrose 50% Injectable 12.5 Gram(s) IV Push once  dextrose 50% Injectable 25 Gram(s) IV Push once  doxycycline hyclate Capsule 100 milliGRAM(s) Oral every 12 hours  enoxaparin Injectable 40 milliGRAM(s) SubCutaneous every 24 hours  folic acid 1 milliGRAM(s) Oral daily  glucagon  Injectable 1 milliGRAM(s) IntraMuscular once  insulin glargine Injectable (LANTUS) 8 Unit(s) SubCutaneous every morning  insulin lispro (ADMELOG) corrective regimen sliding scale   SubCutaneous four times a day before meals  methadone    Tablet 25 milliGRAM(s) Oral every 8 hours  multivitamin 1 Tablet(s) Oral daily  pancrelipase (ZENPEP 20,000 Lipase Units) 1 Capsule(s) Oral three times a day with meals  pantoprazole  Injectable 40 milliGRAM(s) IV Push every 12 hours  polyethylene glycol 3350 17 Gram(s) Oral two times a day  thiamine 100 milliGRAM(s) Oral daily    MEDICATIONS  (PRN):  acetaminophen     Tablet .. 650 milliGRAM(s) Oral every 6 hours PRN Temp greater or equal to 38C (100.4F), Mild Pain (1 - 3)  ALBUTerol    90 MICROgram(s) HFA Inhaler 2 Puff(s) Inhalation every 6 hours PRN Bronchospasm  aluminum hydroxide/magnesium hydroxide/simethicone Suspension 30 milliLiter(s) Oral every 4 hours PRN Dyspepsia  melatonin 3 milliGRAM(s) Oral at bedtime PRN Insomnia  morphine   Solution 15 milliGRAM(s) Oral every 4 hours PRN Moderate Pain (4 - 6)  morphine   Solution 20 milliGRAM(s) Oral every 4 hours PRN Severe Pain (7 - 10)  ondansetron Injectable 4 milliGRAM(s) IV Push every 8 hours PRN Nausea and/or Vomiting  zolpidem 5 milliGRAM(s) Oral at bedtime PRN Insomnia      Vital Signs Last 24 Hrs  T(C): 36.7 (23 Mar 2022 09:10), Max: 36.8 (22 Mar 2022 15:12)  T(F): 98 (23 Mar 2022 09:10), Max: 98.3 (22 Mar 2022 15:12)  HR: 83 (23 Mar 2022 09:10) (70 - 83)  BP: 129/76 (23 Mar 2022 09:10) (129/76 - 155/81)  BP(mean): --  RR: 18 (23 Mar 2022 09:10) (18 - 18)  SpO2: 100% (23 Mar 2022 09:10) (99% - 100%)        Physical Exam:          Constitutional: frail looking, cachectic appearing  HEENT: NC/AT, EOMI, PERRLA, temporal wasting  Neck: supple; thyroid not palpable  Back: no tenderness  Respiratory: respiratory effort normal; clear to auscultation  Cardiovascular: S1S2 regular, no murmurs  Abdomen: soft, not tender, not distended, positive BS; no liver or spleen organomegaly, peg in place  Genitourinary: no suprapubic tenderness  Musculoskeletal: no muscle tenderness, no joint swelling or tenderness; right upper extremity picc line removed  Neurological/ Psychiatric: AxOx3, judgement and insight normal;  moving all extremities  Skin: no rashes; no palpable lesions    Labs: all available labs reviewed                 Labs:                         Labs:                        9.4    8.38  )-----------( 410      ( 23 Mar 2022 06:57 )             29.2     03-23    132<L>  |  98  |  13  ----------------------------<  251<H>  4.4   |  31  |  0.41<L>    Ca    8.5      23 Mar 2022 06:57  Phos  2.3     03-23  Mg     1.8     03-23             Cultures:       Culture - Blood (collected 03-17-22 @ 05:04)  Source: .Blood None  Final Report (03-22-22 @ 11:00):    No Growth Final      Ferritin, Serum: 447 ng/mL (03-15-22 @ 07:30)            < from: CT Chest w/ IV Cont (03.17.22 @ 10:35) >    IMPRESSION:    No pulmonary embolism.    Few ill-defined clustered nodules in the right lower lobe, likely related   impacted bronchioles.    3 mm right upper lobe nodule. Recommend follow-up per protocol of primary   pancreatic cancer.    Trace pleural effusions.    Vertebral body erosions and sclerosis abutting the the T6-T7 disc space   and thickening of the paraspinal soft tissues, raising suspicion for   discitis/osteomyelitis.    < end of copied text >      < from: MR Thoracic Spine w/wo IV Cont (03.19.22 @ 12:36) >    ACC: [id number] EXAM:  MR SPINE THORACIC WAW IC                          PROCEDURE DATE:  03/19/2022          INTERPRETATION:  MR thoracic with and without gadolinium    CLINICAL INDICATION:  Pancreatic mass, IVDA  , back pain    TECHNIQUE:   Sagittal and axial T1-weighted images, sagittal STIR images,    and sagittal and axial T2-weighted images of the thoracic spine were   obtained.   Following 7.5 cc of Gadavist administration intravenously/ 0   cc discarded , sagittal and axial T1-weighted fat-saturated images were   obtained.    FINDINGS:   No prior similar studies are available for review.    Acute osteomyelitis/discitis identified at T6-7 with enhancement and   increased signal within the inferior T6 and superior T7 endplates and   with in the intervening disc. Enhancement in the paravertebral soft   tissues at this level and ventral epidural space is noted, LEFT greater   than RIGHT.    Thoracic vertebral alignment is preserved.  Thoracic vertebral body   heights are maintained.  Marrow signal intensity within thoracic   vertebral bodies and posterior elements is significant for marrow edema   at the T6-7 disc endplates.    Thoracic intervertebral discs are otherwise preserved. Tiny LEFT   paracentral disc herniation noted at T8-9 without cord impingement.    The thoracic cord maintains intact morphology.  Thoracic cord signal   intensity is intact.  No abnormal enhancement occurs within the canal.    Atelectasis is seen within the posterior medial lungs.    Cervical spondylosis noted at C4-5 through C6-C7 on the sagittal   T2-weighted localizing sequence.      IMPRESSION:  Acute osteomyelitis/discitis identified at T6-7 with   enhancement and increased signal within the inferior T6 and superior T7   endplates and within the intervening disc. Enhancement in the   paravertebral soft tissues at this level and ventral epidural space is   noted, LEFT greater than RIGHT.    < end of copied text >            Radiology: all available radiological tests reviewed    Advanced directives addressed: full resuscitation

## 2022-03-23 NOTE — PROGRESS NOTE ADULT - ASSESSMENT
55 y/o male with PMHX of IVDA with heroin-- last used 2 months ago as per patient, opioid dependence on methadone, IDDM, chronic pancreatitis, and recent significant weight loss over the last 1 year admitted on 3/14 from [facility] in [place] for ERCP, further gi workup. The patient has left upper extremity picc line, had the ERCP done as well as peg tube placed. There is concern for pancreatic cancer, however the calcifications in the pancrease made access difficult for tissue to be obtained. Patient has no appetite, is cachectic and history per medical chart as patient is poor historian.     1. Patient admitted with pancreatitis, incidentally found to have vertebral disciitis on imaging, unclear if this truly represents an active infection  - follow up cultures   - serial cbc and monitor temperature   - reviewed prior medical records to evaluate for resistant or atypical pathogens   - MRI revealed thoracic osteo/disciitis  - day # 6 doxycycline  - tolerating antibiotics without rashes or side effects   - should continue doxycycline 100 mg po q 12 hours until 4/28  Will follow

## 2022-03-23 NOTE — PROGRESS NOTE ADULT - ASSESSMENT
Assessment:   56 year old male phx of IVDA with heroin last used 2 months  ago as per patient, opioid dependence on methadone, IDDM, chronic pancreatitis,  and recent significant weight loss over admitted for panreatitis, found to have  pancreatic mass.      Process of Care  --Reviewed dx/treatment problems and alignment with Goals of Care    Physical Aspects of Care  --Pain  c/w 25mg TID Methadone  PO   c/w breakthrough morphine  monitor QT   Do not titrate Methadone for 6 more days if needed reassess then.   Check QT prior to increase.   pt looks better today and slowly improving his long acting control w/ Methadone is likely better option at this time d/t IVDA      --Bowel Regimen  +constipation  risk for constipation d/t immobility and opiates  as per GI   c/w mobility agents    --Dyspnea  No SOB at this time  comfortable and in NAD    --Nausea Vomiting  denies    --Weakness  PT as tolerated     Psychological and Psychiatric Aspects of Care:   Grief Bereavement emotional support provided  --Hx of psychiatric dx: none  -Pastoral Care Available PRN     Social Aspects of Care  -SW involved     Cultural Aspects  -Primary Language: English    Goals of Care:      currently FULL CODE   GOC  at this time unchanged.       Ethical and Legal Aspects:   NA    Capacity- pt w/ capacity for complex decisions    HCP/Surrogate: Eusebio Pugh (brother)  Code Status- FULL   MOLST- NA  Dispo Plan- dc to snf  Discussed With:  Case coordinated with attending and SW and RN     Time Spent: 45 minutes including the care, coordination and counseling of this patient, 50% of which was spent coordinating and counseling.

## 2022-03-24 LAB
BUN SERPL-MCNC: 14 MG/DL — SIGNIFICANT CHANGE UP (ref 7–23)
CALCIUM SERPL-MCNC: 8.6 MG/DL — SIGNIFICANT CHANGE UP (ref 8.5–10.1)
CHLORIDE SERPL-SCNC: 96 MMOL/L — SIGNIFICANT CHANGE UP (ref 96–108)
CO2 SERPL-SCNC: 35 MMOL/L — HIGH (ref 22–31)
CREAT SERPL-MCNC: 0.43 MG/DL — LOW (ref 0.5–1.3)
EGFR: 126 ML/MIN/1.73M2 — SIGNIFICANT CHANGE UP
GLUCOSE SERPL-MCNC: 180 MG/DL — HIGH (ref 70–99)
HCT VFR BLD CALC: 29.9 % — LOW (ref 39–50)
HGB BLD-MCNC: 9.4 G/DL — LOW (ref 13–17)
MAGNESIUM SERPL-MCNC: 1.6 MG/DL — SIGNIFICANT CHANGE UP (ref 1.6–2.6)
MCHC RBC-ENTMCNC: 26.6 PG — LOW (ref 27–34)
MCHC RBC-ENTMCNC: 31.4 GM/DL — LOW (ref 32–36)
MCV RBC AUTO: 84.5 FL — SIGNIFICANT CHANGE UP (ref 80–100)
PLATELET # BLD AUTO: 441 K/UL — HIGH (ref 150–400)
POTASSIUM SERPL-MCNC: 4.5 MMOL/L — SIGNIFICANT CHANGE UP (ref 3.5–5.3)
POTASSIUM SERPL-SCNC: 4.5 MMOL/L — SIGNIFICANT CHANGE UP (ref 3.5–5.3)
RBC # BLD: 3.54 M/UL — LOW (ref 4.2–5.8)
RBC # FLD: 17 % — HIGH (ref 10.3–14.5)
SODIUM SERPL-SCNC: 130 MMOL/L — LOW (ref 135–145)
WBC # BLD: 8.48 K/UL — SIGNIFICANT CHANGE UP (ref 3.8–10.5)
WBC # FLD AUTO: 8.48 K/UL — SIGNIFICANT CHANGE UP (ref 3.8–10.5)

## 2022-03-24 PROCEDURE — 99233 SBSQ HOSP IP/OBS HIGH 50: CPT

## 2022-03-24 PROCEDURE — 99232 SBSQ HOSP IP/OBS MODERATE 35: CPT

## 2022-03-24 RX ORDER — PANTOPRAZOLE SODIUM 20 MG/1
40 TABLET, DELAYED RELEASE ORAL ONCE
Refills: 0 | Status: COMPLETED | OUTPATIENT
Start: 2022-03-24 | End: 2022-03-24

## 2022-03-24 RX ORDER — ZOLPIDEM TARTRATE 10 MG/1
5 TABLET ORAL AT BEDTIME
Refills: 0 | Status: DISCONTINUED | OUTPATIENT
Start: 2022-03-24 | End: 2022-03-29

## 2022-03-24 RX ORDER — INSULIN GLARGINE 100 [IU]/ML
12 INJECTION, SOLUTION SUBCUTANEOUS EVERY MORNING
Refills: 0 | Status: DISCONTINUED | OUTPATIENT
Start: 2022-03-24 | End: 2022-03-26

## 2022-03-24 RX ADMIN — Medication 100 MILLIGRAM(S): at 10:05

## 2022-03-24 RX ADMIN — MORPHINE SULFATE 20 MILLIGRAM(S): 50 CAPSULE, EXTENDED RELEASE ORAL at 01:09

## 2022-03-24 RX ADMIN — Medication 1 CAPSULE(S): at 18:05

## 2022-03-24 RX ADMIN — Medication 8: at 12:19

## 2022-03-24 RX ADMIN — Medication 100 MILLIGRAM(S): at 10:04

## 2022-03-24 RX ADMIN — ZOLPIDEM TARTRATE 5 MILLIGRAM(S): 10 TABLET ORAL at 21:13

## 2022-03-24 RX ADMIN — Medication 1 CAPSULE(S): at 12:19

## 2022-03-24 RX ADMIN — METHADONE HYDROCHLORIDE 25 MILLIGRAM(S): 40 TABLET ORAL at 21:13

## 2022-03-24 RX ADMIN — Medication 1 CAPSULE(S): at 08:07

## 2022-03-24 RX ADMIN — MORPHINE SULFATE 20 MILLIGRAM(S): 50 CAPSULE, EXTENDED RELEASE ORAL at 05:52

## 2022-03-24 RX ADMIN — INSULIN GLARGINE 8 UNIT(S): 100 INJECTION, SOLUTION SUBCUTANEOUS at 08:00

## 2022-03-24 RX ADMIN — Medication 2: at 08:01

## 2022-03-24 RX ADMIN — MORPHINE SULFATE 15 MILLIGRAM(S): 50 CAPSULE, EXTENDED RELEASE ORAL at 19:20

## 2022-03-24 RX ADMIN — Medication 100 MILLIGRAM(S): at 21:13

## 2022-03-24 RX ADMIN — PANTOPRAZOLE SODIUM 40 MILLIGRAM(S): 20 TABLET, DELAYED RELEASE ORAL at 22:36

## 2022-03-24 RX ADMIN — Medication 1 TABLET(S): at 10:04

## 2022-03-24 RX ADMIN — MORPHINE SULFATE 20 MILLIGRAM(S): 50 CAPSULE, EXTENDED RELEASE ORAL at 11:22

## 2022-03-24 RX ADMIN — Medication 1 MILLIGRAM(S): at 10:05

## 2022-03-24 RX ADMIN — Medication 6: at 21:14

## 2022-03-24 RX ADMIN — MORPHINE SULFATE 20 MILLIGRAM(S): 50 CAPSULE, EXTENDED RELEASE ORAL at 00:09

## 2022-03-24 RX ADMIN — ENOXAPARIN SODIUM 40 MILLIGRAM(S): 100 INJECTION SUBCUTANEOUS at 11:51

## 2022-03-24 RX ADMIN — METHADONE HYDROCHLORIDE 25 MILLIGRAM(S): 40 TABLET ORAL at 05:52

## 2022-03-24 RX ADMIN — MORPHINE SULFATE 20 MILLIGRAM(S): 50 CAPSULE, EXTENDED RELEASE ORAL at 06:52

## 2022-03-24 NOTE — CHART NOTE - NSCHARTNOTESELECT_GEN_ALL_CORE
Dietitian PPN Rec'd Note
Event Note
Off Service Note
Dietitian BRIEF NOTE
Dietitian BRIEF NOTE
Dietitian PPN follow-up note
Dietitian PPN follow-up note
Dietitian SALVADOR Cardenas
Event Note

## 2022-03-24 NOTE — PROGRESS NOTE ADULT - ASSESSMENT
57 y/o male with PMHX of IVDA with heroin , opioid dependence on methadone, IDDM, chronic pancreatitis, abnormal weight loss over the last 1 year who was a transfer from [facility] ([place] on 3/14/22  for possible ERCP.  Patient initially presented to outside hospital for worsening abdominal pain.  Patient had CT/ MRI/ US of the abdomen which showed chronic pancreatitis, gastric distention (for which he had an NGT),  intra/ extra hepatic biliary ductal dilatation, and fluid collection/  mass in the head of the pancreas compressing the SMV and proximal portal vein.    Upon admission, patient with significantly elevated ALK PHOS 1200.      Epigastric abdominal pain due to   Pancreatic mass,  associated with biliary and pancreatic duct dilatation , pancreatic stones with underlying chronic pancreatitis s/p ERCP, EUS s/p FNA  Partial gastric outlet obstruction due to severe duodenal inflammation s/p NGT now s/p GJ tube   Cholelithiasis and marked gallbladder distention without acute cholecystitis  Elevated CA 19-9   - 3/16/22 - s/p EUS and FNA of the pancreatic mass , ERCP, pancreatic sphincterotomy , s/p pancreatic stent , s/p GJ tube   - s/p GJ tube- resume TFs with recreational PO feeds   - watch for refeeding syndrome; replace Mg/Phos PRN   3/24  complete workup with tumor markers/CT chest/Heme-Onc Cx - results noted - no mets to chest/follow-up biopsy results     Hematemesis 3/15/22 Acute blood loss anemia due to upper GI bleeding   Iron deficiency   - s/p lovenox 40 3/14  - d/sherice   - Cont Protonix drip- change to IVP   - s/p  2 Units PRBC  - SKIN TEAR at GJ site : causing the anemia   - above repaired, H&H stable, no further bleeding  3/24 pt continues to have intermittent nausea - resume  TFs tonight - plan for lowered rate and volume tonight   - if he tolerates it, then would dc tmr     Mild leukocytosis likely due to malignancy  LLL opacity suspected Pneumonia possible aspiration  On CT abd there was suspicion of  LLL PNA and was on ZOsyn  CT chest does not show any consolidation - therefore there is no pneumonia and will stop Zosyn  There is possible discitis on CT imaging and will get MRI to r/o DISCITIS/OM vs Metastatic lesion   Discussed with DR Lewis and started on Doxy for possible discitis - MRI T SPINE confirms this   Patient has a MIDLINE (he came with it)  and plan to  dc especially in light of IVDA - this fell off on 3/19    DM type2 with A1C 9.9 with hypoglycemic episode on 3/15   on PO Diet, resume low dose lantus and titrate up as tolerated  hold TFs tonight as patient nauseous and abdo full after eating today   3/24 increase lantus, continue RISS     Abnormal Weight Loss   Severe Protein Calorie Malnutrition/ Cachexia      Severe hypoalbuminemia  Hyponatremia 2/2 poor po intake   3/24 - now off IVFs; is on PO diet and he's eating well; TFs are only supplementary during the night     IVDA/ Opioid Dependence  - Patient was on methadone 60 mg daily, discussed with Pall Care Dr Hernandez - will place on methadone 25 TID.    3/24 - DC higher dose morphine po as he has been lethargic     DVT Proph:  resume LOVENOX 40     DISPO - on PO and for trial of TFs tonight   placed on low dose of lantus and will titrate up  if no further emesis

## 2022-03-24 NOTE — PROGRESS NOTE ADULT - SUBJECTIVE AND OBJECTIVE BOX
HPI:      Mr. Pugh is a 56 year old male phx of IVDA with heroin last used 2 months  ago as per patient, opioid dependence on methadone (states for 27 years??), IDDM, chronic pancreatitis, and recent significant weight loss over admitted for panreatitis, found to have pancreatic mass.     3/16/22  Pt seen and examined bed side, NGT inplace to suciton. Pt in signficant pain this morning last dose of Morphine 4mg was at 7am and I saw him closer to 11.  He states the 4mg had little to no effect.  We discussed options for pain managment now and then possibly moving forward if symptoms do not alleviate.     He is frustrated that he's so sick, he wishes he could pull the NGT and go home.  He has some underlying diagnosis of anxiety/depression he states -- he doesn't know what psych meds he's on and doesn't have his cellphone to give us contact info on outpatient psych.      As per SW note:  Hx of Patient's residence is in the [place], was discharged from  [facility] on 2/22/2022 to [facility] in [place] for ASHLEY. Brother is  Eusebio .     Patient told me that Eusebio would be his HCP if he's unable to make any medical decisions.       3/17/22  pt seen and examined  pain is better controlled "not perfect" but better.  He c/o hunger but remains NPO at this time  on exam he does appear more comfortable and is even more conversive now w/ his pain control.       3/18/22  pt seen and examined  c/o sputum  but no sob or cp at this time  no nausea or vomiting  Used 3 doses of breakthrough morphine yesterday       3/21  pt reports frequent use of  morphine (80mg PO morphine in 24 hours + 60mg Methadone standing d/t hx of IVDA)  case d/w patient and team.   Dose 15mg tonight of Methadone   Tomorrow DC 60mg and Start 25mg Methadone TID    Pt denies nausea vomiting shortness of breath.     3/22  pt still somewhat uncomfortable  PO changes were started at this time breakthrough is held.     3/23  pt doing well sitting up in bed   better mood was able to smile and discuss things a little more clearly  Pain is not completely removed- which i did explain would not be the case for now we're looking to better manage to allow him to have better quality of life     Discussed methadone not titrating for about a week as it takes that time to take full dosage effect and safe titration about 7-8days.     3/24  Patient was seen and examined.  Denies nausea, vomiting, shortness of breath, chest pain, headaches, abdominal pain, constipation     PAIN: (x )Yes   ( )No  Level: severe  Location: abdominal pain  Intensity:    6/10  Quality: aching and sharp  Aggravating Factors: movement  Alleviating Factors: pain meds  Radiation: back  Duration/Timing: constant  Impact on ADLs: significant    DYSPNEA: ( ) Yes  (x ) No  Level:      Review of Systems:    Anxiety- feeling calm   Depression- + hx  Physical Discomfort- more comfortable  Dyspnea- none   Constipation- none  Nausea- negative  Vomiting-  none  Anorexia- denies  Weight Loss- significant   Cough- none  Secretions- none  Fatigue- improved today  Weakness- +   Delirium- none        PHYSICAL EXAM:    Vital Signs Last 24 Hrs  T(C): 36.6 (24 Mar 2022 08:44), Max: 36.8 (23 Mar 2022 15:01)  T(F): 97.9 (24 Mar 2022 08:44), Max: 98.3 (23 Mar 2022 15:01)  HR: 72 (24 Mar 2022 08:44) (72 - 78)  BP: 144/78 (24 Mar 2022 08:44) (137/73 - 148/80)  BP(mean): --  RR: 17 (24 Mar 2022 08:44) (17 - 18)  SpO2: 100% (24 Mar 2022 08:44) (100% - 100%)  Daily     Daily     PPSV2:  45 %  FAST: na    General: in NAD comfortable  HEENT: EOMI PERRL  Lungs: CTABL   Cardiac: s1s2 no mgr  GI: soft nontender  : voids  Ext: moves all 4 spontaneously  Neuro: follows commands         LABS:                        9.4    8.48  )-----------( 441      ( 24 Mar 2022 07:06 )             29.9     03-24    130<L>  |  96  |  14  ----------------------------<  180<H>  4.5   |  35<H>  |  0.43<L>    Ca    8.6      24 Mar 2022 07:06  Phos  2.3     03-23  Mg     1.6     03-24        Albumin: Albumin, Serum: 1.4 g/dL (03-18 @ 05:04)      Allergies    No Known Allergies    Intolerances      MEDICATIONS  (STANDING):  bisacodyl Suppository 10 milliGRAM(s) Rectal <User Schedule>  dextrose 40% Gel 15 Gram(s) Oral once  dextrose 5%. 1000 milliLiter(s) (50 mL/Hr) IV Continuous <Continuous>  dextrose 5%. 1000 milliLiter(s) (100 mL/Hr) IV Continuous <Continuous>  dextrose 50% Injectable 25 Gram(s) IV Push once  dextrose 50% Injectable 12.5 Gram(s) IV Push once  dextrose 50% Injectable 25 Gram(s) IV Push once  doxycycline hyclate Capsule 100 milliGRAM(s) Oral every 12 hours  enoxaparin Injectable 40 milliGRAM(s) SubCutaneous every 24 hours  folic acid 1 milliGRAM(s) Oral daily  glucagon  Injectable 1 milliGRAM(s) IntraMuscular once  insulin glargine Injectable (LANTUS) 8 Unit(s) SubCutaneous every morning  insulin lispro (ADMELOG) corrective regimen sliding scale   SubCutaneous four times a day before meals  methadone    Tablet 25 milliGRAM(s) Oral every 8 hours  multivitamin 1 Tablet(s) Oral daily  pancrelipase (ZENPEP 20,000 Lipase Units) 1 Capsule(s) Oral three times a day with meals  pantoprazole  Injectable 40 milliGRAM(s) IV Push every 12 hours  polyethylene glycol 3350 17 Gram(s) Oral two times a day  thiamine 100 milliGRAM(s) Oral daily    MEDICATIONS  (PRN):  acetaminophen     Tablet .. 650 milliGRAM(s) Oral every 6 hours PRN Temp greater or equal to 38C (100.4F), Mild Pain (1 - 3)  ALBUTerol    90 MICROgram(s) HFA Inhaler 2 Puff(s) Inhalation every 6 hours PRN Bronchospasm  aluminum hydroxide/magnesium hydroxide/simethicone Suspension 30 milliLiter(s) Oral every 4 hours PRN Dyspepsia  melatonin 3 milliGRAM(s) Oral at bedtime PRN Insomnia  morphine   Solution 15 milliGRAM(s) Oral every 4 hours PRN Moderate Pain (4 - 6)  morphine   Solution 20 milliGRAM(s) Oral every 4 hours PRN Severe Pain (7 - 10)  ondansetron Injectable 4 milliGRAM(s) IV Push every 8 hours PRN Nausea and/or Vomiting  zolpidem 5 milliGRAM(s) Oral at bedtime PRN Insomnia      RADIOLOGY:

## 2022-03-24 NOTE — PROGRESS NOTE ADULT - SUBJECTIVE AND OBJECTIVE BOX
57 y/o male with PMHX of IVDA with heroin(  last used 2 months ago ) , opioid dependence on methadone, IDDM, chronic pancreatitis, and recent significant weight loss over the last 1 year who was a transfer from [facility] on 3/14/22  for possible ERCP.  Patient initially presented to outside hospital for worsening abdominal pain.  Patient had CT/ MRI/ US of the abdomen which showed chronic pancreatitis, gastric distention (for which he had an NGT),  intra/ extra hepatic biliary ductal dilatation, and fluid collection/ ? mass in the head of the pancreas compressing the SMV and proximal portal vein.  Patient was transferred to  for possible need for ERCP and further GI evaluation.  Upon admission, patient with significantly elevated ALKPHOS 1200.      Hospital course :   3/23 - no cp palps sob; some abdo fullness after eating - felt nauseous today, discussed with RN - will hold TFs tonight   3/24 - sleepy today - stop higher dose morphine; resume TFs tonight at lowered goal     PHYSICAL EXAM:  Vital Signs Last 24 Hrs  T(F): 97.9 (24 Mar 2022 08:44), Max: 98.1 (23 Mar 2022 22:02)  HR: 72 (24 Mar 2022 13:13) (72 - 78)  BP: 138/74 (24 Mar 2022 13:13) (138/74 - 148/80)  RR: 10 (24 Mar 2022 13:13) (10 - 18)  SpO2: 99% (24 Mar 2022 13:13) (99% - 100%)  GENERAL: cachectic,  NAD  HEAD:  Atraumatic, Normocephalic  EYES: EOMI, PERRLA, conjunctiva and sclera clear  NECK: Supple, No JVD  NERVOUS SYSTEM:  Alert & Oriented X2, moves all extremities  CHEST/LUNG: Clear to auscultation bilaterally; No rales, rhonchi, wheezing, or rubs  HEART: Regular rate and rhythm; No murmurs, rubs, or gallops  ABDOMEN: Soft, fullness, +BS, GJ tube present, bleeding resolved   GENITOURINARY- Voiding, no palpable bladder  EXTREMITIES:  2+ Peripheral Pulses, No clubbing, cyanosis, or edema  MUSCULOSKELETAL No muscle tenderness, Muscle tone normal, No joint tenderness, no Joint swelling, Joint range of motion-normal    RADIOLOGY & ADDITIONAL TESTS:  MR MRCP w/wo IV Cont (03.15.22 @ 15:44) >  Pancreatic head mass suspicious for neoplasm with associated biliary and   pancreatic ductal dilatation. Recommend biopsy.  Upper abdominal lymphadenopathy  Distended stomach. Recommend clinical correlation for partial gastric   outlet obstruction    CT ABDOMEN AND PELVIS IC                        IMPRESSION:  Chronic pancreatitis.  Pancreatic ductal dilatation due to multiple intraductal stones.  Biliary ductal dilatation the exact etiology which could be due to post   pancreatic stricture. Consider further evaluation with MRI or endoscopic   ultrasound as an occult pancreatic mass is not excluded.  Cystic pancreatic lesions most likely representing pseudocysts.  Cholelithiasis and marked gallbladder distention, without evidence of   acute cholecystitis.  Mild left lower lobe opacity suspicious for pneumonia.  Enteric tube with tip in the distal esophagus.    < from: MR Thoracic Spine w/wo IV Cont (03.19.22 @ 12:36) >  IMPRESSION:  Acute osteomyelitis/discitis identified at T6-7 with   enhancement and increased signal within the inferior T6 and superior T7   endplates and within the intervening disc. Enhancement in the   paravertebral soft tissues at this level and ventral epidural space is   noted, LEFT greater than RIGHT.    LABS: All Labs Reviewed:                      9.4    8.48  )-----------( 441      ( 24 Mar 2022 07:06 )             29.9   130<L>  |  96  |  14  ----------------------------<  180<H>  4.5   |  35<H>  |  0.43<L>  Ca    8.6      24 Mar 2022 07:06  Phos  2.3     03-23  Mg     1.6     03-24    MEDS:   acetaminophen     Tablet .. 650 milliGRAM(s) Oral every 6 hours PRN  ALBUTerol    90 MICROgram(s) HFA Inhaler 2 Puff(s) Inhalation every 6 hours PRN  aluminum hydroxide/magnesium hydroxide/simethicone Suspension 30 milliLiter(s) Oral every 4 hours PRN  bisacodyl Suppository 10 milliGRAM(s) Rectal <User Schedule>  doxycycline hyclate Capsule 100 milliGRAM(s) Oral every 12 hours  enoxaparin Injectable 40 milliGRAM(s) SubCutaneous every 24 hours  folic acid 1 milliGRAM(s) Oral daily  insulin glargine Injectable (LANTUS) 8 Unit(s) SubCutaneous every morning  insulin lispro (ADMELOG) corrective regimen sliding scale   SubCutaneous four times a day before meals  melatonin 3 milliGRAM(s) Oral at bedtime PRN  methadone    Tablet 25 milliGRAM(s) Oral every 8 hours  morphine   Solution 15 milliGRAM(s) Oral every 4 hours PRN  multivitamin 1 Tablet(s) Oral daily  ondansetron Injectable 4 milliGRAM(s) IV Push every 8 hours PRN  pancrelipase (ZENPEP 20,000 Lipase Units) 1 Capsule(s) Oral three times a day with meals  pantoprazole  Injectable 40 milliGRAM(s) IV Push every 12 hours  polyethylene glycol 3350 17 Gram(s) Oral two times a day  thiamine 100 milliGRAM(s) Oral daily  zolpidem 5 milliGRAM(s) Oral at bedtime PRN

## 2022-03-24 NOTE — PROGRESS NOTE ADULT - ASSESSMENT
Assessment:   56 year old male phx of IVDA with heroin last used 2 months  ago as per patient, opioid dependence on methadone, IDDM, chronic pancreatitis,  and recent significant weight loss over admitted for pancreatitis found to have  pancreatic mass.      Process of Care  --Reviewed dx/treatment problems and alignment with Goals of Care    Physical Aspects of Care  --Pain  c/w 25mg TID Methadone  PO   c/w breakthrough morphine  monitor QT   continue w/ current management  I think at this stage would hold off on any titration as pts symptoms may improve w/ decreased inflammation but also would not titrate Methadone for at LEAST 5 more days.  but would prefer to leave as is at this time.   will likely need adjustment in outpatient setting once dc'd         --Bowel Regimen  +constipation  risk for constipation d/t immobility and opiates  as per GI   c/w mobility agents    --Dyspnea  No SOB at this time  comfortable and in NAD    --Nausea Vomiting  denies    --Weakness  PT as tolerated     Psychological and Psychiatric Aspects of Care:   Grief Bereavement emotional support provided  --Hx of psychiatric dx: none  -Pastoral Care Available PRN     Social Aspects of Care  -SW involved     Cultural Aspects  -Primary Language: English    Goals of Care:      currently FULL CODE   GOC  at this time unchanged he wants to remain fullcode  will c discussions w/ his brother/hcp.   he is working hard to get stronger and feel better.      Ethical and Legal Aspects:   NA    Capacity- pt w/ capacity for complex decisions    HCP/Surrogate: Eusebio Pugh (brother)  Code Status- FULL   MOLST- NA  Dispo Plan- dc to snf  Discussed With:  Case coordinated with attending and SW and RN     Time Spent: 45 minutes including the care, coordination and counseling of this patient, 50% of which was spent coordinating and counseling.      Assessment:   56 year old male phx of IVDA with heroin last used 2 months  ago as per patient, opioid dependence on methadone, IDDM, chronic pancreatitis,  and recent significant weight loss over admitted for pancreatitis found to have  pancreatic mass.      Process of Care  --Reviewed dx/treatment problems and alignment with Goals of Care    Physical Aspects of Care  --Pain  c/w 25mg TID Methadone  PO   c/w breakthrough morphine  monitor QT   continue w/ current management  I think at this stage would hold off on any titration as pts symptoms may improve w/ decreased inflammation but also would not titrate Methadone for at LEAST 5 more days.  but would prefer to leave as is at this time.   will likely need adjustment in outpatient setting once dc'd   Please reconsult PRN as pt pain better controlled , if needed please recall pall PRN       --Bowel Regimen  +constipation  risk for constipation d/t immobility and opiates  as per GI   c/w mobility agents    --Dyspnea  No SOB at this time  comfortable and in NAD    --Nausea Vomiting  denies    --Weakness  PT as tolerated     Psychological and Psychiatric Aspects of Care:   Grief Bereavement emotional support provided  --Hx of psychiatric dx: none  -Pastoral Care Available PRN     Social Aspects of Care  -SW involved     Cultural Aspects  -Primary Language: English    Goals of Care:      currently FULL CODE   GOC  at this time unchanged he wants to remain fullcode  will c discussions w/ his brother/hcp.   he is working hard to get stronger and feel better.      Ethical and Legal Aspects:   NA    Capacity- pt w/ capacity for complex decisions    HCP/Surrogate: Eusebio Pguh (brother)  Code Status- FULL   MOLST- NA  Dispo Plan- dc to snf  Discussed With:  Case coordinated with attending and SW and RN     Time Spent: 45 minutes including the care, coordination and counseling of this patient, 50% of which was spent coordinating and counseling.

## 2022-03-25 LAB
ADD ON TEST-SPECIMEN IN LAB: SIGNIFICANT CHANGE UP
ANION GAP SERPL CALC-SCNC: 4 MMOL/L — LOW (ref 5–17)
BUN SERPL-MCNC: 13 MG/DL — SIGNIFICANT CHANGE UP (ref 7–23)
CALCIUM SERPL-MCNC: 8.5 MG/DL — SIGNIFICANT CHANGE UP (ref 8.5–10.1)
CHLORIDE SERPL-SCNC: 92 MMOL/L — LOW (ref 96–108)
CK SERPL-CCNC: 38 U/L — SIGNIFICANT CHANGE UP (ref 26–308)
CO2 SERPL-SCNC: 32 MMOL/L — HIGH (ref 22–31)
CREAT SERPL-MCNC: 0.43 MG/DL — LOW (ref 0.5–1.3)
EGFR: 126 ML/MIN/1.73M2 — SIGNIFICANT CHANGE UP
GLUCOSE SERPL-MCNC: 352 MG/DL — HIGH (ref 70–99)
HCT VFR BLD CALC: 29 % — LOW (ref 39–50)
HGB BLD-MCNC: 8.8 G/DL — LOW (ref 13–17)
LIDOCAIN IGE QN: 86 U/L — SIGNIFICANT CHANGE UP (ref 73–393)
MAGNESIUM SERPL-MCNC: 1.6 MG/DL — SIGNIFICANT CHANGE UP (ref 1.6–2.6)
MCHC RBC-ENTMCNC: 26.8 PG — LOW (ref 27–34)
MCHC RBC-ENTMCNC: 30.3 GM/DL — LOW (ref 32–36)
MCV RBC AUTO: 88.4 FL — SIGNIFICANT CHANGE UP (ref 80–100)
NT-PROBNP SERPL-SCNC: 160 PG/ML — HIGH (ref 0–125)
PLATELET # BLD AUTO: 406 K/UL — HIGH (ref 150–400)
POTASSIUM SERPL-MCNC: 4.1 MMOL/L — SIGNIFICANT CHANGE UP (ref 3.5–5.3)
POTASSIUM SERPL-SCNC: 4.1 MMOL/L — SIGNIFICANT CHANGE UP (ref 3.5–5.3)
RBC # BLD: 3.28 M/UL — LOW (ref 4.2–5.8)
RBC # FLD: 17.4 % — HIGH (ref 10.3–14.5)
SODIUM SERPL-SCNC: 128 MMOL/L — LOW (ref 135–145)
TROPONIN I, HIGH SENSITIVITY RESULT: <3 NG/L — SIGNIFICANT CHANGE UP
WBC # BLD: 7.72 K/UL — SIGNIFICANT CHANGE UP (ref 3.8–10.5)
WBC # FLD AUTO: 7.72 K/UL — SIGNIFICANT CHANGE UP (ref 3.8–10.5)

## 2022-03-25 PROCEDURE — 71045 X-RAY EXAM CHEST 1 VIEW: CPT | Mod: 26

## 2022-03-25 PROCEDURE — 99232 SBSQ HOSP IP/OBS MODERATE 35: CPT

## 2022-03-25 PROCEDURE — 70450 CT HEAD/BRAIN W/O DYE: CPT | Mod: 26

## 2022-03-25 PROCEDURE — 93010 ELECTROCARDIOGRAM REPORT: CPT

## 2022-03-25 RX ORDER — PANTOPRAZOLE SODIUM 20 MG/1
40 TABLET, DELAYED RELEASE ORAL
Refills: 0 | Status: DISCONTINUED | OUTPATIENT
Start: 2022-03-25 | End: 2022-03-29

## 2022-03-25 RX ORDER — MORPHINE SULFATE 50 MG/1
15 CAPSULE, EXTENDED RELEASE ORAL EVERY 4 HOURS
Refills: 0 | Status: DISCONTINUED | OUTPATIENT
Start: 2022-03-26 | End: 2022-03-29

## 2022-03-25 RX ORDER — ONDANSETRON 8 MG/1
4 TABLET, FILM COATED ORAL EVERY 6 HOURS
Refills: 0 | Status: DISCONTINUED | OUTPATIENT
Start: 2022-03-25 | End: 2022-03-29

## 2022-03-25 RX ORDER — SODIUM CHLORIDE 9 MG/ML
500 INJECTION INTRAMUSCULAR; INTRAVENOUS; SUBCUTANEOUS ONCE
Refills: 0 | Status: COMPLETED | OUTPATIENT
Start: 2022-03-25 | End: 2022-03-25

## 2022-03-25 RX ORDER — DEXTROSE 50 % IN WATER 50 %
15 SYRINGE (ML) INTRAVENOUS ONCE
Refills: 0 | Status: DISCONTINUED | OUTPATIENT
Start: 2022-03-25 | End: 2022-03-29

## 2022-03-25 RX ORDER — INSULIN LISPRO 100/ML
VIAL (ML) SUBCUTANEOUS
Refills: 0 | Status: DISCONTINUED | OUTPATIENT
Start: 2022-03-25 | End: 2022-03-29

## 2022-03-25 RX ORDER — INSULIN LISPRO 100/ML
VIAL (ML) SUBCUTANEOUS AT BEDTIME
Refills: 0 | Status: DISCONTINUED | OUTPATIENT
Start: 2022-03-25 | End: 2022-03-29

## 2022-03-25 RX ORDER — INSULIN LISPRO 100/ML
2 VIAL (ML) SUBCUTANEOUS
Refills: 0 | Status: DISCONTINUED | OUTPATIENT
Start: 2022-03-25 | End: 2022-03-29

## 2022-03-25 RX ORDER — PANTOPRAZOLE SODIUM 20 MG/1
40 TABLET, DELAYED RELEASE ORAL
Refills: 0 | Status: DISCONTINUED | OUTPATIENT
Start: 2022-03-25 | End: 2022-03-25

## 2022-03-25 RX ORDER — ASPIRIN/CALCIUM CARB/MAGNESIUM 324 MG
81 TABLET ORAL DAILY
Refills: 0 | Status: DISCONTINUED | OUTPATIENT
Start: 2022-03-25 | End: 2022-03-29

## 2022-03-25 RX ADMIN — ENOXAPARIN SODIUM 40 MILLIGRAM(S): 100 INJECTION SUBCUTANEOUS at 10:54

## 2022-03-25 RX ADMIN — Medication 10: at 08:03

## 2022-03-25 RX ADMIN — SODIUM CHLORIDE 1000 MILLILITER(S): 9 INJECTION INTRAMUSCULAR; INTRAVENOUS; SUBCUTANEOUS at 12:40

## 2022-03-25 RX ADMIN — Medication 100 MILLIGRAM(S): at 21:01

## 2022-03-25 RX ADMIN — MORPHINE SULFATE 15 MILLIGRAM(S): 50 CAPSULE, EXTENDED RELEASE ORAL at 20:31

## 2022-03-25 RX ADMIN — Medication 100 MILLIGRAM(S): at 10:53

## 2022-03-25 RX ADMIN — Medication 1 CAPSULE(S): at 18:23

## 2022-03-25 RX ADMIN — Medication 1 MILLIGRAM(S): at 10:53

## 2022-03-25 RX ADMIN — MORPHINE SULFATE 15 MILLIGRAM(S): 50 CAPSULE, EXTENDED RELEASE ORAL at 06:55

## 2022-03-25 RX ADMIN — Medication 1 CAPSULE(S): at 08:46

## 2022-03-25 RX ADMIN — MORPHINE SULFATE 15 MILLIGRAM(S): 50 CAPSULE, EXTENDED RELEASE ORAL at 02:56

## 2022-03-25 RX ADMIN — MORPHINE SULFATE 15 MILLIGRAM(S): 50 CAPSULE, EXTENDED RELEASE ORAL at 12:12

## 2022-03-25 RX ADMIN — Medication 1 CAPSULE(S): at 13:32

## 2022-03-25 RX ADMIN — Medication 1 TABLET(S): at 10:53

## 2022-03-25 RX ADMIN — Medication 6: at 17:09

## 2022-03-25 RX ADMIN — METHADONE HYDROCHLORIDE 25 MILLIGRAM(S): 40 TABLET ORAL at 13:33

## 2022-03-25 RX ADMIN — ONDANSETRON 4 MILLIGRAM(S): 8 TABLET, FILM COATED ORAL at 12:01

## 2022-03-25 RX ADMIN — Medication 100 MILLIGRAM(S): at 10:54

## 2022-03-25 RX ADMIN — METHADONE HYDROCHLORIDE 25 MILLIGRAM(S): 40 TABLET ORAL at 06:09

## 2022-03-25 RX ADMIN — MORPHINE SULFATE 15 MILLIGRAM(S): 50 CAPSULE, EXTENDED RELEASE ORAL at 02:26

## 2022-03-25 RX ADMIN — Medication 4: at 12:01

## 2022-03-25 RX ADMIN — ZOLPIDEM TARTRATE 5 MILLIGRAM(S): 10 TABLET ORAL at 21:02

## 2022-03-25 RX ADMIN — METHADONE HYDROCHLORIDE 25 MILLIGRAM(S): 40 TABLET ORAL at 21:02

## 2022-03-25 RX ADMIN — MORPHINE SULFATE 15 MILLIGRAM(S): 50 CAPSULE, EXTENDED RELEASE ORAL at 19:31

## 2022-03-25 RX ADMIN — INSULIN GLARGINE 12 UNIT(S): 100 INJECTION, SOLUTION SUBCUTANEOUS at 08:29

## 2022-03-25 RX ADMIN — PANTOPRAZOLE SODIUM 40 MILLIGRAM(S): 20 TABLET, DELAYED RELEASE ORAL at 13:32

## 2022-03-25 NOTE — PROGRESS NOTE ADULT - ASSESSMENT
55 y/o male with PMHX of IVDA with heroin , opioid dependence on methadone, IDDM, chronic pancreatitis, abnormal weight loss over the last 1 year who was a transfer from [facility] on 3/14/22  for possible ERCP.  Patient initially presented to outside hospital for worsening abdominal pain.  Patient had CT/ MRI/ US of the abdomen which showed chronic pancreatitis, gastric distention (for which he had an NGT),  intra/ extra hepatic biliary ductal dilatation, and fluid collection/  mass in the head of the pancreas compressing the SMV and proximal portal vein.    Upon admission, patient with significantly elevated ALK PHOS 1200.      Epigastric abdominal pain due to   Pancreatic mass,  associated with biliary and pancreatic duct dilatation , pancreatic stones with underlying chronic pancreatitis s/p ERCP, EUS s/p FNA consistent with pancreatitis and fibrosis  Partial gastric outlet obstruction due to severe duodenal inflammation s/p NGT now s/p GJ tube   Cholelithiasis and marked gallbladder distention without acute cholecystitis  Elevated CA 19-9 , non-specific , biopsy negative for malignancy   - 3/16/22 - s/p EUS and FNA of the pancreatic mass , ERCP, pancreatic sphincterotomy , s/p pancreatic stent , s/p GJ tube   - s/p GJ tube- resume TFs with recreational PO feeds   - watch for refeeding syndrome; replace Mg/Phos PRN   3/24  complete workup with tumor markers/CT chest/Heme-Onc Cx - results noted - no mets to chest/follow-up biopsy results     Hematemesis 3/15/22 Acute blood loss anemia due to upper GI bleeding   Iron deficiency   - s/p lovenox 40 3/14  - d/sherice   - Cont Protonix drip- change to IVP   - s/p  2 Units PRBC  - SKIN TEAR at GJ site : causing the anemia   - above repaired, H&H stable, no further bleeding  3/24 pt continues to have intermittent nausea - resume  TFs tonight - plan for lowered rate and volume tonight   - if he tolerates it, then would dc tmr     Mild leukocytosis likely due to malignancy  LLL opacity suspected Pneumonia possible aspiration  On CT abd there was suspicion of  LLL PNA and was on ZOsyn  CT chest does not show any consolidation - therefore there is no pneumonia and will stop Zosyn  There is possible discitis on CT imaging and will get MRI to r/o DISCITIS/OM vs Metastatic lesion   Discussed with DR Lewis and started on Doxy for possible discitis - MRI T SPINE confirms this   Patient has a MIDLINE (he came with it)  and plan to  dc especially in light of IVDA - this fell off on 3/19    DM type2 with A1C 9.9 with hypoglycemic episode on 3/15   on PO Diet, resume low dose lantus and titrate up as tolerated  hold TFs tonight as patient nauseous and abdo full after eating today   - c/w  lantus 8--> 12   - add pre-meal insulin, c/w correctional      Abnormal Weight Loss   Severe Protein Calorie Malnutrition/ Cachexia      Severe hypoalbuminemia  Hyponatremia due to  poor po intake    - now off IVFs; is on PO diet and he's eating well; TFs are only supplementary during the night   - recalculated Na 134 due to hyperglycemia, monitor     IVDA/ Opioid Dependence  - Patient was on methadone 60 mg daily, discussed with Pall Care Dr Hernandez - will place on methadone 25 TID.     - DC higher dose morphine po as he has been lethargic     3/25  Chest pain, Epigastric pain due to GERD  - c/w PPI , r/o ACS, worsening of pancreatitis   - zofran prn     Dizziness , Cerebrovascular disease with old lacunar infarctions   - CT head - multiple old lacunar infarction  - check lipid profile  - would add ASA 81 enteric coated     DVT Proph:  resume LOVENOX 40     DISPO - on PO and for trial of TFs tonight   placed on low dose of lantus and will titrate up  if no further emesis

## 2022-03-25 NOTE — PROGRESS NOTE ADULT - SUBJECTIVE AND OBJECTIVE BOX
cc - abdominal pain    57 y/o male with PMHX of IVDA with heroin(  last used 2 months ago ) , opioid dependence on methadone, IDDM, chronic pancreatitis, and recent significant weight loss over the last 1 year who was a transfer from [facility] on 3/14/22  for possible ERCP.  Patient initially presented to outside hospital for worsening abdominal pain.  Patient had CT/ MRI/ US of the abdomen which showed chronic pancreatitis, gastric distention (for which he had an NGT),  intra/ extra hepatic biliary ductal dilatation, and fluid collection/ ? mass in the head of the pancreas compressing the SMV and proximal portal vein.  Patient was transferred to  for possible need for ERCP and further GI evaluation.  Upon admission, patient with significantly elevated ALKPHOS 1200.      Hospital course :   3/23 - no cp palps sob; some abdo fullness after eating - felt nauseous today, discussed with RN - will hold TFs tonight   3/24 - sleepy today - stop higher dose morphine; resume TFs tonight at lowered goal   3/25 - pt seen and examined, + epigastric pain in am, dizziness, + nausea, dyspnea, plan discussed     Review of system- Rest of the review of system are negative except mentioned in HPI    Vitals reviewed for last 24 hours  T(C): 36.6 (03-25-22 @ 15:28), Max: 36.9 (03-24-22 @ 21:09)  T(F): 97.9 (03-25-22 @ 15:28), Max: 98.4 (03-24-22 @ 21:09)  HR: 80 (03-25-22 @ 15:28) (78 - 84)  BP: 128/62 (03-25-22 @ 15:28) (124/66 - 149/76)  RR: 18 (03-25-22 @ 15:28) (16 - 18)  SpO2: 100% (03-25-22 @ 15:28) (99% - 100%)  Wt(kg): --    PHYSICAL EXAM:  GENERAL: cachectic,  NAD  HEAD:  Atraumatic, Normocephalic  EYES: EOMI, PERRLA, conjunctiva and sclera clear  NECK: Supple, No JVD  NERVOUS SYSTEM:  Alert & Oriented X2, moves all extremities  CHEST/LUNG: Clear to auscultation bilaterally; No rales, rhonchi, wheezing, or rubs  HEART: Regular rate and rhythm; No murmurs, rubs, or gallops  ABDOMEN: Soft, fullness, +BS, GJ tube present, bleeding resolved   GENITOURINARY- Voiding, no palpable bladder  EXTREMITIES:  2+ Peripheral Pulses, No clubbing, cyanosis, or edema  MUSCULOSKELETAL No muscle tenderness, Muscle tone normal, No joint tenderness, no Joint swelling, Joint range of motion-normal    RADIOLOGY & ADDITIONAL TESTS: all reviewed      Xray Chest 1 View-PORTABLE IMMEDIATE (Xray Chest 1 View-PORTABLE IMMEDIATE .) (03.25.22 @ 12:56) >  FINDINGS: Cardiac silhouette and pulmonary vasculature are within normal   limits with no consolidation, effusion, gross adenopathy, pneumothorax or   acute osseous finding.    IMPRESSION:  No acute radiographic findings and no change     CT Head No Cont (03.25.22 @ 12:34) >  FINDINGS:   No previous examinations are available for review.    The brain demonstrates mild anterior periventricular white matter   ischemia with multiple old lacunar infarctions in the BILATERAL basal   ganglia and thalami.   No acute cerebral cortical infarct is seen.  No   intracranial hemorrhage is found.  No mass effect is found in the brain.    The ventricles, sulci and basal cisterns appear unremarkable.    The orbits are unremarkable.  The paranasal sinuses are clear.  The nasal   cavity appears intact.  The nasopharynx is symmetric.  The central skull   base, petrous temporal bones and calvarium remain intact.      IMPRESSION:   mild anterior periventricular white matter ischemia with   multiple old lacunar infarctions in the BILATERAL basal ganglia and   thalami.        MR MRCP w/wo IV Cont (03.15.22 @ 15:44) >  Pancreatic head mass suspicious for neoplasm with associated biliary and   pancreatic ductal dilatation. Recommend biopsy.  Upper abdominal lymphadenopathy  Distended stomach. Recommend clinical correlation for partial gastric   outlet obstruction    CT ABDOMEN AND PELVIS IC                        IMPRESSION:  Chronic pancreatitis.  Pancreatic ductal dilatation due to multiple intraductal stones.  Biliary ductal dilatation the exact etiology which could be due to post   pancreatic stricture. Consider further evaluation with MRI or endoscopic   ultrasound as an occult pancreatic mass is not excluded.  Cystic pancreatic lesions most likely representing pseudocysts.  Cholelithiasis and marked gallbladder distention, without evidence of   acute cholecystitis.  Mild left lower lobe opacity suspicious for pneumonia.  Enteric tube with tip in the distal esophagus.    < from: MR Thoracic Spine w/wo IV Cont (03.19.22 @ 12:36) >  IMPRESSION:  Acute osteomyelitis/discitis identified at T6-7 with   enhancement and increased signal within the inferior T6 and superior T7   endplates and within the intervening disc. Enhancement in the   paravertebral soft tissues at this level and ventral epidural space is   noted, LEFT greater than RIGHT.    LABS: All Labs Reviewed:           03-25    128<L>  |  92<L>  |  13  ----------------------------<  352<H>  4.1   |  32<H>  |  0.43<L>    Ca    8.5      25 Mar 2022 06:50  Mg     1.6     03-25                          8.8    7.72  )-----------( 406      ( 25 Mar 2022 06:50 )             29.0     CARDIAC MARKERS ( 25 Mar 2022 13:00 )  x     / x     / 38 U/L / x     / x          MEDS:   acetaminophen     Tablet .. 650 milliGRAM(s) Oral every 6 hours PRN  ALBUTerol    90 MICROgram(s) HFA Inhaler 2 Puff(s) Inhalation every 6 hours PRN  aluminum hydroxide/magnesium hydroxide/simethicone Suspension 30 milliLiter(s) Oral every 4 hours PRN  bisacodyl Suppository 10 milliGRAM(s) Rectal <User Schedule>  doxycycline hyclate Capsule 100 milliGRAM(s) Oral every 12 hours  enoxaparin Injectable 40 milliGRAM(s) SubCutaneous every 24 hours  folic acid 1 milliGRAM(s) Oral daily  insulin glargine Injectable (LANTUS) 8 Unit(s) SubCutaneous every morning  insulin lispro (ADMELOG) corrective regimen sliding scale   SubCutaneous four times a day before meals  melatonin 3 milliGRAM(s) Oral at bedtime PRN  methadone    Tablet 25 milliGRAM(s) Oral every 8 hours  morphine   Solution 15 milliGRAM(s) Oral every 4 hours PRN  multivitamin 1 Tablet(s) Oral daily  ondansetron Injectable 4 milliGRAM(s) IV Push every 8 hours PRN  pancrelipase (ZENPEP 20,000 Lipase Units) 1 Capsule(s) Oral three times a day with meals  pantoprazole  Injectable 40 milliGRAM(s) IV Push every 12 hours  polyethylene glycol 3350 17 Gram(s) Oral two times a day  thiamine 100 milliGRAM(s) Oral daily  zolpidem 5 milliGRAM(s) Oral at bedtime PRN

## 2022-03-26 LAB
ALBUMIN SERPL ELPH-MCNC: 1.8 G/DL — LOW (ref 3.3–5)
ALP SERPL-CCNC: 1547 U/L — HIGH (ref 40–120)
ALT FLD-CCNC: 158 U/L — HIGH (ref 12–78)
ANION GAP SERPL CALC-SCNC: 4 MMOL/L — LOW (ref 5–17)
AST SERPL-CCNC: 65 U/L — HIGH (ref 15–37)
BASOPHILS # BLD AUTO: 0.05 K/UL — SIGNIFICANT CHANGE UP (ref 0–0.2)
BASOPHILS NFR BLD AUTO: 0.6 % — SIGNIFICANT CHANGE UP (ref 0–2)
BILIRUB SERPL-MCNC: 0.9 MG/DL — SIGNIFICANT CHANGE UP (ref 0.2–1.2)
BUN SERPL-MCNC: 11 MG/DL — SIGNIFICANT CHANGE UP (ref 7–23)
CALCIUM SERPL-MCNC: 8.9 MG/DL — SIGNIFICANT CHANGE UP (ref 8.5–10.1)
CHLORIDE SERPL-SCNC: 93 MMOL/L — LOW (ref 96–108)
CHOLEST SERPL-MCNC: 147 MG/DL — SIGNIFICANT CHANGE UP
CO2 SERPL-SCNC: 32 MMOL/L — HIGH (ref 22–31)
CREAT SERPL-MCNC: 0.48 MG/DL — LOW (ref 0.5–1.3)
EGFR: 122 ML/MIN/1.73M2 — SIGNIFICANT CHANGE UP
EOSINOPHIL # BLD AUTO: 0.12 K/UL — SIGNIFICANT CHANGE UP (ref 0–0.5)
EOSINOPHIL NFR BLD AUTO: 1.4 % — SIGNIFICANT CHANGE UP (ref 0–6)
GLUCOSE SERPL-MCNC: 298 MG/DL — HIGH (ref 70–99)
HCT VFR BLD CALC: 27.9 % — LOW (ref 39–50)
HDLC SERPL-MCNC: 59 MG/DL — SIGNIFICANT CHANGE UP
HGB BLD-MCNC: 8.8 G/DL — LOW (ref 13–17)
IMM GRANULOCYTES NFR BLD AUTO: 0.5 % — SIGNIFICANT CHANGE UP (ref 0–1.5)
LIPID PNL WITH DIRECT LDL SERPL: 76 MG/DL — SIGNIFICANT CHANGE UP
LYMPHOCYTES # BLD AUTO: 1.64 K/UL — SIGNIFICANT CHANGE UP (ref 1–3.3)
LYMPHOCYTES # BLD AUTO: 18.8 % — SIGNIFICANT CHANGE UP (ref 13–44)
MCHC RBC-ENTMCNC: 27.4 PG — SIGNIFICANT CHANGE UP (ref 27–34)
MCHC RBC-ENTMCNC: 31.5 GM/DL — LOW (ref 32–36)
MCV RBC AUTO: 86.9 FL — SIGNIFICANT CHANGE UP (ref 80–100)
MONOCYTES # BLD AUTO: 0.6 K/UL — SIGNIFICANT CHANGE UP (ref 0–0.9)
MONOCYTES NFR BLD AUTO: 6.9 % — SIGNIFICANT CHANGE UP (ref 2–14)
NEUTROPHILS # BLD AUTO: 6.26 K/UL — SIGNIFICANT CHANGE UP (ref 1.8–7.4)
NEUTROPHILS NFR BLD AUTO: 71.8 % — SIGNIFICANT CHANGE UP (ref 43–77)
NON HDL CHOLESTEROL: 88 MG/DL — SIGNIFICANT CHANGE UP
PLATELET # BLD AUTO: 432 K/UL — HIGH (ref 150–400)
POTASSIUM SERPL-MCNC: 4.9 MMOL/L — SIGNIFICANT CHANGE UP (ref 3.5–5.3)
POTASSIUM SERPL-SCNC: 4.9 MMOL/L — SIGNIFICANT CHANGE UP (ref 3.5–5.3)
PROT SERPL-MCNC: 7.4 GM/DL — SIGNIFICANT CHANGE UP (ref 6–8.3)
RBC # BLD: 3.21 M/UL — LOW (ref 4.2–5.8)
RBC # FLD: 17.6 % — HIGH (ref 10.3–14.5)
SODIUM SERPL-SCNC: 129 MMOL/L — LOW (ref 135–145)
TRIGL SERPL-MCNC: 57 MG/DL — SIGNIFICANT CHANGE UP
WBC # BLD: 8.71 K/UL — SIGNIFICANT CHANGE UP (ref 3.8–10.5)
WBC # FLD AUTO: 8.71 K/UL — SIGNIFICANT CHANGE UP (ref 3.8–10.5)

## 2022-03-26 PROCEDURE — 99232 SBSQ HOSP IP/OBS MODERATE 35: CPT

## 2022-03-26 RX ORDER — INSULIN GLARGINE 100 [IU]/ML
15 INJECTION, SOLUTION SUBCUTANEOUS EVERY MORNING
Refills: 0 | Status: DISCONTINUED | OUTPATIENT
Start: 2022-03-27 | End: 2022-03-29

## 2022-03-26 RX ADMIN — Medication 81 MILLIGRAM(S): at 10:45

## 2022-03-26 RX ADMIN — Medication 1 TABLET(S): at 10:46

## 2022-03-26 RX ADMIN — METHADONE HYDROCHLORIDE 25 MILLIGRAM(S): 40 TABLET ORAL at 21:17

## 2022-03-26 RX ADMIN — Medication 2 UNIT(S): at 13:27

## 2022-03-26 RX ADMIN — Medication 2 UNIT(S): at 08:43

## 2022-03-26 RX ADMIN — Medication 6: at 08:44

## 2022-03-26 RX ADMIN — Medication 1 CAPSULE(S): at 08:50

## 2022-03-26 RX ADMIN — METHADONE HYDROCHLORIDE 25 MILLIGRAM(S): 40 TABLET ORAL at 05:56

## 2022-03-26 RX ADMIN — MORPHINE SULFATE 15 MILLIGRAM(S): 50 CAPSULE, EXTENDED RELEASE ORAL at 18:11

## 2022-03-26 RX ADMIN — Medication 100 MILLIGRAM(S): at 21:16

## 2022-03-26 RX ADMIN — Medication 1 CAPSULE(S): at 13:44

## 2022-03-26 RX ADMIN — MORPHINE SULFATE 15 MILLIGRAM(S): 50 CAPSULE, EXTENDED RELEASE ORAL at 11:06

## 2022-03-26 RX ADMIN — PANTOPRAZOLE SODIUM 40 MILLIGRAM(S): 20 TABLET, DELAYED RELEASE ORAL at 21:16

## 2022-03-26 RX ADMIN — ENOXAPARIN SODIUM 40 MILLIGRAM(S): 100 INJECTION SUBCUTANEOUS at 11:15

## 2022-03-26 RX ADMIN — Medication 2: at 16:41

## 2022-03-26 RX ADMIN — Medication 100 MILLIGRAM(S): at 10:46

## 2022-03-26 RX ADMIN — Medication 1 CAPSULE(S): at 16:42

## 2022-03-26 RX ADMIN — METHADONE HYDROCHLORIDE 25 MILLIGRAM(S): 40 TABLET ORAL at 13:26

## 2022-03-26 RX ADMIN — MORPHINE SULFATE 15 MILLIGRAM(S): 50 CAPSULE, EXTENDED RELEASE ORAL at 03:08

## 2022-03-26 RX ADMIN — ZOLPIDEM TARTRATE 5 MILLIGRAM(S): 10 TABLET ORAL at 21:16

## 2022-03-26 RX ADMIN — Medication 100 MILLIGRAM(S): at 10:45

## 2022-03-26 RX ADMIN — PANTOPRAZOLE SODIUM 40 MILLIGRAM(S): 20 TABLET, DELAYED RELEASE ORAL at 10:46

## 2022-03-26 RX ADMIN — MORPHINE SULFATE 15 MILLIGRAM(S): 50 CAPSULE, EXTENDED RELEASE ORAL at 02:08

## 2022-03-26 RX ADMIN — Medication 4: at 13:28

## 2022-03-26 RX ADMIN — Medication 2 UNIT(S): at 16:40

## 2022-03-26 RX ADMIN — INSULIN GLARGINE 12 UNIT(S): 100 INJECTION, SOLUTION SUBCUTANEOUS at 08:43

## 2022-03-26 RX ADMIN — Medication 1 MILLIGRAM(S): at 10:45

## 2022-03-26 NOTE — PROGRESS NOTE ADULT - ASSESSMENT
55 y/o male with PMHX of IVDA with heroin , opioid dependence on methadone, IDDM, chronic pancreatitis, abnormal weight loss over the last 1 year who was a transfer from [facility] ([place] on 3/14/22  for possible ERCP.  Patient initially presented to outside hospital for worsening abdominal pain.  Patient had CT/ MRI/ US of the abdomen which showed chronic pancreatitis, gastric distention (for which he had an NGT),  intra/ extra hepatic biliary ductal dilatation, and fluid collection/  mass in the head of the pancreas compressing the SMV and proximal portal vein.    Upon admission, patient with significantly elevated ALK PHOS 1200.      Epigastric abdominal pain due to   Pancreatic mass,  associated with biliary and pancreatic duct dilatation , pancreatic stones with underlying chronic pancreatitis s/p ERCP, EUS s/p FNA consistent with pancreatitis and fibrosis  Partial gastric outlet obstruction due to severe duodenal inflammation s/p NGT now s/p GJ tube   Cholelithiasis and marked gallbladder distention without acute cholecystitis  Elevated CA 19-9 , non-specific , biopsy negative for malignancy   - 3/16/22 - s/p EUS and FNA of the pancreatic mass , ERCP, pancreatic sphincterotomy , s/p pancreatic stent , s/p GJ tube   - s/p GJ tube- resume TFs with recreational PO feeds   - watch for refeeding syndrome; replace Mg/Phos PRN   -  complete workup with tumor markers/CT chest/Heme-Onc Cx - results noted - no mets to chest  - biopsy results consistent with inflammation and fibrosis of the pancreas     Hematemesis 3/15/22 Acute blood loss anemia due to upper GI bleeding   Iron deficiency   - s/p lovenox 40 3/14  - d/sherice   - Cont Protonix drip- change to IVP   - s/p  2 Units PRBC  - SKIN TEAR at GJ site : causing the anemia   - above repaired, H&H stable, no further bleeding  - c/w TF as per nutritional recs      Mild leukocytosis  , resolved   LLL opacity suspected Pneumonia possible aspiration  On CT abd there was suspicion of  LLL PNA and was on ZOsyn  CT chest does not show any consolidation - therefore there is no pneumonia and will stop Zosyn  There is possible discitis on CT imaging and will get MRI to r/o DISCITIS/OM vs Metastatic lesion   Discussed with DR Lewis and started on Doxy for possible discitis - MRI T SPINE confirms this   Patient has a MIDLINE (he came with it)  and plan to  dc especially in light of IVDA - this fell off on 3/19    DM type2 with A1C 9.9 with hypoglycemic episode on 3/15   on PO Diet, resume low dose lantus and titrate up as tolerated  hold TFs tonight as patient nauseous and abdo full after eating today   - c/w  lantus 8--> 12 --> 15   - add pre-meal insulin, c/w correctional      Abnormal Weight Loss   Severe Protein Calorie Malnutrition/ Cachexia      Severe hypoalbuminemia  Hyponatremia due to  poor po intake    - now off IVFs; is on PO diet and he's eating well; TFs are only supplementary during the night   - recalculated Na 134 due to hyperglycemia, monitor     IVDA/ Opioid Dependence  - Patient was on methadone 60 mg daily, discussed with [facility] Dr Hernandez - will place on methadone 25 TID.     - DC higher dose morphine po as he has been lethargic     3/25  Chest pain, Epigastric pain due to GERD ruled out ACS   - c/w PPI , ruled out  ACS, worsening of pancreatitis   - zofran prn     Dizziness , Cerebrovascular disease with old lacunar infarctions   - CT head - multiple old lacunar infarction  - check lipid profile  - would add ASA 81 enteric coated     DVT Proph:  resume LOVENOX 40     DISPO - on PO and TF, d/c planning pending insurance authorizations

## 2022-03-26 NOTE — PROGRESS NOTE ADULT - SUBJECTIVE AND OBJECTIVE BOX
cc - abdominal pain    55 y/o male with PMHX of IVDA with heroin(  last used 2 months ago ) , opioid dependence on methadone, IDDM, chronic pancreatitis, and recent significant weight loss over the last 1 year who was a transfer from [facility] on 3/14/22  for possible ERCP.  Patient initially presented to outside hospital for worsening abdominal pain.  Patient had CT/ MRI/ US of the abdomen which showed chronic pancreatitis, gastric distention (for which he had an NGT),  intra/ extra hepatic biliary ductal dilatation, and fluid collection/ ? mass in the head of the pancreas compressing the SMV and proximal portal vein.  Patient was transferred to  for possible need for ERCP and further GI evaluation.  Upon admission, patient with significantly elevated ALKPHOS 1200.      Hospital course :   3/23 - no cp palps sob; some abdo fullness after eating - felt nauseous today, discussed with RN - will hold TFs tonight   3/24 - sleepy today - stop higher dose morphine; resume TFs tonight at lowered goal   3/25 - pt seen and examined, + epigastric pain in am, dizziness, + nausea, dyspnea, plan discussed   3/26 - pt seen and examined, epigastric pain better today, denies dyspnea, tolerating po intake, afebrile, plan discussed     Review of system- Rest of the review of system are negative except mentioned in HPI    Vitals reviewed for last 24 hours  T(C): 36.6 (03-26-22 @ 15:02), Max: 36.8 (03-25-22 @ 19:31)  T(F): 97.8 (03-26-22 @ 15:02), Max: 98.2 (03-25-22 @ 19:31)  HR: 80 (03-26-22 @ 15:02) (74 - 92)  BP: 129/79 (03-26-22 @ 15:02) (129/73 - 146/75)  RR: 16 (03-26-22 @ 15:02) (15 - 17)  SpO2: 100% (03-26-22 @ 15:02) (100% - 100%)  Wt(kg): --    PHYSICAL EXAM:  GENERAL: cachectic,  NAD  HEAD:  Atraumatic, Normocephalic  EYES: EOMI, PERRLA, conjunctiva and sclera clear  NECK: Supple, No JVD  NERVOUS SYSTEM:  Alert & Oriented X2, moves all extremities  CHEST/LUNG: Clear to auscultation bilaterally; No rales, rhonchi, wheezing, or rubs  HEART: Regular rate and rhythm; No murmurs, rubs, or gallops  ABDOMEN: Soft, fullness, +BS, GJ tube present, bleeding resolved   GENITOURINARY- Voiding, no palpable bladder  EXTREMITIES:  2+ Peripheral Pulses, No clubbing, cyanosis, or edema  MUSCULOSKELETAL No muscle tenderness, Muscle tone normal, No joint tenderness, no Joint swelling, Joint range of motion-normal    RADIOLOGY & ADDITIONAL TESTS: all reviewed      Xray Chest 1 View-PORTABLE IMMEDIATE (Xray Chest 1 View-PORTABLE IMMEDIATE .) (03.25.22 @ 12:56) >  FINDINGS: Cardiac silhouette and pulmonary vasculature are within normal   limits with no consolidation, effusion, gross adenopathy, pneumothorax or   acute osseous finding.    IMPRESSION:  No acute radiographic findings and no change     CT Head No Cont (03.25.22 @ 12:34) >  FINDINGS:   No previous examinations are available for review.    The brain demonstrates mild anterior periventricular white matter   ischemia with multiple old lacunar infarctions in the BILATERAL basal   ganglia and thalami.   No acute cerebral cortical infarct is seen.  No   intracranial hemorrhage is found.  No mass effect is found in the brain.    The ventricles, sulci and basal cisterns appear unremarkable.    The orbits are unremarkable.  The paranasal sinuses are clear.  The nasal   cavity appears intact.  The nasopharynx is symmetric.  The central skull   base, petrous temporal bones and calvarium remain intact.      IMPRESSION:   mild anterior periventricular white matter ischemia with   multiple old lacunar infarctions in the BILATERAL basal ganglia and   thalami.        MR MRCP w/wo IV Cont (03.15.22 @ 15:44) >  Pancreatic head mass suspicious for neoplasm with associated biliary and   pancreatic ductal dilatation. Recommend biopsy.  Upper abdominal lymphadenopathy  Distended stomach. Recommend clinical correlation for partial gastric   outlet obstruction    CT ABDOMEN AND PELVIS IC                        IMPRESSION:  Chronic pancreatitis.  Pancreatic ductal dilatation due to multiple intraductal stones.  Biliary ductal dilatation the exact etiology which could be due to post   pancreatic stricture. Consider further evaluation with MRI or endoscopic   ultrasound as an occult pancreatic mass is not excluded.  Cystic pancreatic lesions most likely representing pseudocysts.  Cholelithiasis and marked gallbladder distention, without evidence of   acute cholecystitis.  Mild left lower lobe opacity suspicious for pneumonia.  Enteric tube with tip in the distal esophagus.    < from: MR Thoracic Spine w/wo IV Cont (03.19.22 @ 12:36) >  IMPRESSION:  Acute osteomyelitis/discitis identified at T6-7 with   enhancement and increased signal within the inferior T6 and superior T7   endplates and within the intervening disc. Enhancement in the   paravertebral soft tissues at this level and ventral epidural space is   noted, LEFT greater than RIGHT.    LABS: All Labs Reviewed:           03-26    129<L>  |  93<L>  |  11  ----------------------------<  298<H>  4.9   |  32<H>  |  0.48<L>    Ca    8.9      26 Mar 2022 06:45  Mg     1.6     03-25    TPro  7.4  /  Alb  1.8<L>  /  TBili  0.9  /  DBili  x   /  AST  65<H>  /  ALT  158<H>  /  AlkPhos  1547<H>  03-26                            8.8    8.71  )-----------( 432      ( 26 Mar 2022 06:45 )             27.9       CARDIAC MARKERS ( 25 Mar 2022 13:00 )  x     / x     / 38 U/L / x     / x            LIVER FUNCTIONS - ( 26 Mar 2022 06:45 )  Alb: 1.8 g/dL / Pro: 7.4 gm/dL / ALK PHOS: 1547 U/L / ALT: 158 U/L / AST: 65 U/L / GGT: x               MEDS:   acetaminophen     Tablet .. 650 milliGRAM(s) Oral every 6 hours PRN  ALBUTerol    90 MICROgram(s) HFA Inhaler 2 Puff(s) Inhalation every 6 hours PRN  aluminum hydroxide/magnesium hydroxide/simethicone Suspension 30 milliLiter(s) Oral every 4 hours PRN  bisacodyl Suppository 10 milliGRAM(s) Rectal <User Schedule>  doxycycline hyclate Capsule 100 milliGRAM(s) Oral every 12 hours  enoxaparin Injectable 40 milliGRAM(s) SubCutaneous every 24 hours  folic acid 1 milliGRAM(s) Oral daily  insulin glargine Injectable (LANTUS) 8 Unit(s) SubCutaneous every morning  insulin lispro (ADMELOG) corrective regimen sliding scale   SubCutaneous four times a day before meals  melatonin 3 milliGRAM(s) Oral at bedtime PRN  methadone    Tablet 25 milliGRAM(s) Oral every 8 hours  morphine   Solution 15 milliGRAM(s) Oral every 4 hours PRN  multivitamin 1 Tablet(s) Oral daily  ondansetron Injectable 4 milliGRAM(s) IV Push every 8 hours PRN  pancrelipase (ZENPEP 20,000 Lipase Units) 1 Capsule(s) Oral three times a day with meals  pantoprazole  Injectable 40 milliGRAM(s) IV Push every 12 hours  polyethylene glycol 3350 17 Gram(s) Oral two times a day  thiamine 100 milliGRAM(s) Oral daily  zolpidem 5 milliGRAM(s) Oral at bedtime PRN

## 2022-03-27 LAB
ALBUMIN SERPL ELPH-MCNC: 1.8 G/DL — LOW (ref 3.3–5)
ALP SERPL-CCNC: 1616 U/L — HIGH (ref 40–120)
ALT FLD-CCNC: 152 U/L — HIGH (ref 12–78)
ANION GAP SERPL CALC-SCNC: 2 MMOL/L — LOW (ref 5–17)
AST SERPL-CCNC: 95 U/L — HIGH (ref 15–37)
BILIRUB SERPL-MCNC: 0.8 MG/DL — SIGNIFICANT CHANGE UP (ref 0.2–1.2)
BUN SERPL-MCNC: 16 MG/DL — SIGNIFICANT CHANGE UP (ref 7–23)
CALCIUM SERPL-MCNC: 8.7 MG/DL — SIGNIFICANT CHANGE UP (ref 8.5–10.1)
CHLORIDE SERPL-SCNC: 98 MMOL/L — SIGNIFICANT CHANGE UP (ref 96–108)
CO2 SERPL-SCNC: 33 MMOL/L — HIGH (ref 22–31)
CREAT SERPL-MCNC: 0.45 MG/DL — LOW (ref 0.5–1.3)
EGFR: 124 ML/MIN/1.73M2 — SIGNIFICANT CHANGE UP
GLUCOSE SERPL-MCNC: 268 MG/DL — HIGH (ref 70–99)
HCT VFR BLD CALC: 29.5 % — LOW (ref 39–50)
HGB BLD-MCNC: 9.2 G/DL — LOW (ref 13–17)
MCHC RBC-ENTMCNC: 27.3 PG — SIGNIFICANT CHANGE UP (ref 27–34)
MCHC RBC-ENTMCNC: 31.2 GM/DL — LOW (ref 32–36)
MCV RBC AUTO: 87.5 FL — SIGNIFICANT CHANGE UP (ref 80–100)
PLATELET # BLD AUTO: 436 K/UL — HIGH (ref 150–400)
POTASSIUM SERPL-MCNC: 5 MMOL/L — SIGNIFICANT CHANGE UP (ref 3.5–5.3)
POTASSIUM SERPL-SCNC: 5 MMOL/L — SIGNIFICANT CHANGE UP (ref 3.5–5.3)
PROT SERPL-MCNC: 7.8 GM/DL — SIGNIFICANT CHANGE UP (ref 6–8.3)
RBC # BLD: 3.37 M/UL — LOW (ref 4.2–5.8)
RBC # FLD: 17.8 % — HIGH (ref 10.3–14.5)
SARS-COV-2 RNA SPEC QL NAA+PROBE: SIGNIFICANT CHANGE UP
SODIUM SERPL-SCNC: 133 MMOL/L — LOW (ref 135–145)
WBC # BLD: 9.17 K/UL — SIGNIFICANT CHANGE UP (ref 3.8–10.5)
WBC # FLD AUTO: 9.17 K/UL — SIGNIFICANT CHANGE UP (ref 3.8–10.5)

## 2022-03-27 PROCEDURE — 99232 SBSQ HOSP IP/OBS MODERATE 35: CPT

## 2022-03-27 RX ORDER — MAGNESIUM HYDROXIDE 400 MG/1
30 TABLET, CHEWABLE ORAL DAILY
Refills: 0 | Status: DISCONTINUED | OUTPATIENT
Start: 2022-03-27 | End: 2022-03-29

## 2022-03-27 RX ADMIN — ENOXAPARIN SODIUM 40 MILLIGRAM(S): 100 INJECTION SUBCUTANEOUS at 09:41

## 2022-03-27 RX ADMIN — Medication 1 CAPSULE(S): at 18:06

## 2022-03-27 RX ADMIN — Medication 100 MILLIGRAM(S): at 09:40

## 2022-03-27 RX ADMIN — Medication 2 UNIT(S): at 07:56

## 2022-03-27 RX ADMIN — Medication 4: at 12:43

## 2022-03-27 RX ADMIN — METHADONE HYDROCHLORIDE 25 MILLIGRAM(S): 40 TABLET ORAL at 21:43

## 2022-03-27 RX ADMIN — Medication 6: at 07:57

## 2022-03-27 RX ADMIN — Medication 81 MILLIGRAM(S): at 09:40

## 2022-03-27 RX ADMIN — Medication 1 CAPSULE(S): at 12:44

## 2022-03-27 RX ADMIN — INSULIN GLARGINE 15 UNIT(S): 100 INJECTION, SOLUTION SUBCUTANEOUS at 07:56

## 2022-03-27 RX ADMIN — POLYETHYLENE GLYCOL 3350 17 GRAM(S): 17 POWDER, FOR SOLUTION ORAL at 21:44

## 2022-03-27 RX ADMIN — METHADONE HYDROCHLORIDE 25 MILLIGRAM(S): 40 TABLET ORAL at 14:31

## 2022-03-27 RX ADMIN — Medication 100 MILLIGRAM(S): at 09:41

## 2022-03-27 RX ADMIN — METHADONE HYDROCHLORIDE 25 MILLIGRAM(S): 40 TABLET ORAL at 05:35

## 2022-03-27 RX ADMIN — Medication 5 MILLIGRAM(S): at 14:31

## 2022-03-27 RX ADMIN — PANTOPRAZOLE SODIUM 40 MILLIGRAM(S): 20 TABLET, DELAYED RELEASE ORAL at 09:41

## 2022-03-27 RX ADMIN — ZOLPIDEM TARTRATE 5 MILLIGRAM(S): 10 TABLET ORAL at 22:14

## 2022-03-27 RX ADMIN — Medication 1 TABLET(S): at 09:41

## 2022-03-27 RX ADMIN — MORPHINE SULFATE 15 MILLIGRAM(S): 50 CAPSULE, EXTENDED RELEASE ORAL at 04:08

## 2022-03-27 RX ADMIN — Medication 1 CAPSULE(S): at 08:22

## 2022-03-27 RX ADMIN — Medication 2 UNIT(S): at 12:43

## 2022-03-27 RX ADMIN — Medication 1 MILLIGRAM(S): at 09:40

## 2022-03-27 RX ADMIN — Medication 100 MILLIGRAM(S): at 21:44

## 2022-03-27 RX ADMIN — MORPHINE SULFATE 15 MILLIGRAM(S): 50 CAPSULE, EXTENDED RELEASE ORAL at 21:07

## 2022-03-27 RX ADMIN — Medication 5 MILLIGRAM(S): at 21:44

## 2022-03-27 RX ADMIN — PANTOPRAZOLE SODIUM 40 MILLIGRAM(S): 20 TABLET, DELAYED RELEASE ORAL at 21:44

## 2022-03-27 RX ADMIN — Medication 2: at 21:55

## 2022-03-27 RX ADMIN — MORPHINE SULFATE 15 MILLIGRAM(S): 50 CAPSULE, EXTENDED RELEASE ORAL at 09:40

## 2022-03-27 RX ADMIN — MORPHINE SULFATE 15 MILLIGRAM(S): 50 CAPSULE, EXTENDED RELEASE ORAL at 04:30

## 2022-03-27 RX ADMIN — MORPHINE SULFATE 15 MILLIGRAM(S): 50 CAPSULE, EXTENDED RELEASE ORAL at 20:07

## 2022-03-27 NOTE — PROGRESS NOTE ADULT - SUBJECTIVE AND OBJECTIVE BOX
cc - abdominal pain    55 y/o male with PMHX of IVDA with heroin(  last used 2 months ago ) , opioid dependence on methadone, IDDM, chronic pancreatitis, and recent significant weight loss over the last 1 year who was a transfer from [facility] on 3/14/22  for possible ERCP.  Patient initially presented to outside hospital for worsening abdominal pain.  Patient had CT/ MRI/ US of the abdomen which showed chronic pancreatitis, gastric distention (for which he had an NGT),  intra/ extra hepatic biliary ductal dilatation, and fluid collection/ ? mass in the head of the pancreas compressing the SMV and proximal portal vein.  Patient was transferred to  for possible need for ERCP and further GI evaluation.  Upon admission, patient with significantly elevated ALKPHOS 1200.      Hospital course :   3/23 - no cp palps sob; some abdo fullness after eating - felt nauseous today, discussed with RN - will hold TFs tonight   3/24 - sleepy today - stop higher dose morphine; resume TFs tonight at lowered goal   3/25 - pt seen and examined, + epigastric pain in am, dizziness, + nausea, dyspnea, plan discussed   3/26 - pt seen and examined, epigastric pain better today, denies dyspnea, tolerating po intake, afebrile, plan discussed   3/27 -pt seen and examined , feels better pain better controlled, afebrile , on O2 supplementation plan discussed     Review of system- Rest of the review of system are negative except mentioned in HPI    Vitals reviewed for last 24 hours  T(C): 36.6 (03-27-22 @ 09:15), Max: 36.8 (03-27-22 @ 04:13)  T(F): 97.9 (03-27-22 @ 09:15), Max: 98.2 (03-27-22 @ 04:13)  HR: 83 (03-27-22 @ 09:15) (70 - 83)  BP: 136/75 (03-27-22 @ 09:15) (129/79 - 140/81)  RR: 18 (03-27-22 @ 09:15) (15 - 18)  SpO2: 98% (03-27-22 @ 09:15) (98% - 100%)  Wt(kg): --    PHYSICAL EXAM:  GENERAL: cachectic,  NAD  HEAD:  Atraumatic, Normocephalic  EYES: EOMI, PERRLA, conjunctiva and sclera clear  NECK: Supple, No JVD  NERVOUS SYSTEM:  Alert & Oriented X2, moves all extremities  CHEST/LUNG: Clear to auscultation bilaterally; No rales, rhonchi, wheezing, or rubs  HEART: Regular rate and rhythm; No murmurs, rubs, or gallops  ABDOMEN: Soft, fullness, +BS, GJ tube present, bleeding resolved   GENITOURINARY- Voiding, no palpable bladder  EXTREMITIES:  2+ Peripheral Pulses, No clubbing, cyanosis, or edema  MUSCULOSKELETAL No muscle tenderness, Muscle tone normal, No joint tenderness, no Joint swelling, Joint range of motion-normal    RADIOLOGY & ADDITIONAL TESTS: all reviewed      Xray Chest 1 View-PORTABLE IMMEDIATE (Xray Chest 1 View-PORTABLE IMMEDIATE .) (03.25.22 @ 12:56) >  FINDINGS: Cardiac silhouette and pulmonary vasculature are within normal   limits with no consolidation, effusion, gross adenopathy, pneumothorax or   acute osseous finding.    IMPRESSION:  No acute radiographic findings and no change     CT Head No Cont (03.25.22 @ 12:34) >  FINDINGS:   No previous examinations are available for review.    The brain demonstrates mild anterior periventricular white matter   ischemia with multiple old lacunar infarctions in the BILATERAL basal   ganglia and thalami.   No acute cerebral cortical infarct is seen.  No   intracranial hemorrhage is found.  No mass effect is found in the brain.    The ventricles, sulci and basal cisterns appear unremarkable.    The orbits are unremarkable.  The paranasal sinuses are clear.  The nasal   cavity appears intact.  The nasopharynx is symmetric.  The central skull   base, petrous temporal bones and calvarium remain intact.      IMPRESSION:   mild anterior periventricular white matter ischemia with   multiple old lacunar infarctions in the BILATERAL basal ganglia and   thalami.        MR MRCP w/wo IV Cont (03.15.22 @ 15:44) >  Pancreatic head mass suspicious for neoplasm with associated biliary and   pancreatic ductal dilatation. Recommend biopsy.  Upper abdominal lymphadenopathy  Distended stomach. Recommend clinical correlation for partial gastric   outlet obstruction    CT ABDOMEN AND PELVIS IC                        IMPRESSION:  Chronic pancreatitis.  Pancreatic ductal dilatation due to multiple intraductal stones.  Biliary ductal dilatation the exact etiology which could be due to post   pancreatic stricture. Consider further evaluation with MRI or endoscopic   ultrasound as an occult pancreatic mass is not excluded.  Cystic pancreatic lesions most likely representing pseudocysts.  Cholelithiasis and marked gallbladder distention, without evidence of   acute cholecystitis.  Mild left lower lobe opacity suspicious for pneumonia.  Enteric tube with tip in the distal esophagus.    < from: MR Thoracic Spine w/wo IV Cont (03.19.22 @ 12:36) >  IMPRESSION:  Acute osteomyelitis/discitis identified at T6-7 with   enhancement and increased signal within the inferior T6 and superior T7   endplates and within the intervening disc. Enhancement in the   paravertebral soft tissues at this level and ventral epidural space is   noted, LEFT greater than RIGHT.    LABS: All Labs Reviewed:       03-27    133<L>  |  98  |  16  ----------------------------<  268<H>  5.0   |  33<H>  |  0.45<L>    Ca    8.7      27 Mar 2022 07:24    TPro  7.8  /  Alb  1.8<L>  /  TBili  0.8  /  DBili  x   /  AST  95<H>  /  ALT  152<H>  /  AlkPhos  1616<H>  03-27                            9.2    9.17  )-----------( 436      ( 27 Mar 2022 07:24 )             29.5     LIVER FUNCTIONS - ( 27 Mar 2022 07:24 )  Alb: 1.8 g/dL / Pro: 7.8 gm/dL / ALK PHOS: 1616 U/L / ALT: 152 U/L / AST: 95 U/L / GGT: x                 03-26    129<L>  |  93<L>  |  11  ----------------------------<  298<H>  4.9   |  32<H>  |  0.48<L>    Ca    8.9      26 Mar 2022 06:45  Mg     1.6     03-25    TPro  7.4  /  Alb  1.8<L>  /  TBili  0.9  /  DBili  x   /  AST  65<H>  /  ALT  158<H>  /  AlkPhos  1547<H>  03-26                        8.8    8.71  )-----------( 432      ( 26 Mar 2022 06:45 )             27.9       CARDIAC MARKERS ( 25 Mar 2022 13:00 )  x     / x     / 38 U/L / x     / x            LIVER FUNCTIONS - ( 26 Mar 2022 06:45 )  Alb: 1.8 g/dL / Pro: 7.4 gm/dL / ALK PHOS: 1547 U/L / ALT: 158 U/L / AST: 65 U/L / GGT: x         < from: Xray Chest 1 View-PORTABLE IMMEDIATE (Xray Chest 1 View-PORTABLE IMMEDIATE .) (03.25.22 @ 12:56) >  FINDINGS: Cardiac silhouette and pulmonary vasculature are within normal   limits with no consolidation, effusion, gross adenopathy, pneumothorax or   acute osseous finding.    IMPRESSION:  No acute radiographic findings and no change    < end of copied text >        MEDS:   acetaminophen     Tablet .. 650 milliGRAM(s) Oral every 6 hours PRN  ALBUTerol    90 MICROgram(s) HFA Inhaler 2 Puff(s) Inhalation every 6 hours PRN  aluminum hydroxide/magnesium hydroxide/simethicone Suspension 30 milliLiter(s) Oral every 4 hours PRN  bisacodyl Suppository 10 milliGRAM(s) Rectal <User Schedule>  doxycycline hyclate Capsule 100 milliGRAM(s) Oral every 12 hours  enoxaparin Injectable 40 milliGRAM(s) SubCutaneous every 24 hours  folic acid 1 milliGRAM(s) Oral daily  insulin glargine Injectable (LANTUS) 8 Unit(s) SubCutaneous every morning  insulin lispro (ADMELOG) corrective regimen sliding scale   SubCutaneous four times a day before meals  melatonin 3 milliGRAM(s) Oral at bedtime PRN  methadone    Tablet 25 milliGRAM(s) Oral every 8 hours  morphine   Solution 15 milliGRAM(s) Oral every 4 hours PRN  multivitamin 1 Tablet(s) Oral daily  ondansetron Injectable 4 milliGRAM(s) IV Push every 8 hours PRN  pancrelipase (ZENPEP 20,000 Lipase Units) 1 Capsule(s) Oral three times a day with meals  pantoprazole  Injectable 40 milliGRAM(s) IV Push every 12 hours  polyethylene glycol 3350 17 Gram(s) Oral two times a day  thiamine 100 milliGRAM(s) Oral daily  zolpidem 5 milliGRAM(s) Oral at bedtime PRN

## 2022-03-27 NOTE — PROGRESS NOTE ADULT - ASSESSMENT
57 y/o male with PMHX of IVDA with heroin , opioid dependence on methadone, IDDM, chronic pancreatitis, abnormal weight loss over the last 1 year who was a transfer from [facility] on 3/14/22  for possible ERCP.  Patient initially presented to outside hospital for worsening abdominal pain.  Patient had CT/ MRI/ US of the abdomen which showed chronic pancreatitis, gastric distention (for which he had an NGT),  intra/ extra hepatic biliary ductal dilatation, and fluid collection/  mass in the head of the pancreas compressing the SMV and proximal portal vein.    Upon admission, patient with significantly elevated ALK PHOS 1200.      Epigastric abdominal pain due to   Pancreatic mass,  associated with biliary and pancreatic duct dilatation , pancreatic stones with underlying chronic pancreatitis s/p ERCP, EUS s/p FNA consistent with pancreatitis and fibrosis  Partial gastric outlet obstruction due to severe duodenal inflammation s/p NGT now s/p GJ tube   Cholelithiasis and marked gallbladder distention without acute cholecystitis  Elevated CA 19-9 , non-specific , biopsy negative for malignancy   - 3/16/22 - s/p EUS and FNA of the pancreatic mass , ERCP, pancreatic sphincterotomy , s/p pancreatic stent , s/p GJ tube   - s/p GJ tube- resume TFs with recreational PO feeds   - watch for refeeding syndrome; replace Mg/Phos PRN   -  complete workup with tumor markers/CT chest/Heme-Onc Cx - results noted - no mets to chest  - biopsy results consistent with inflammation and fibrosis of the pancreas     Hematemesis 3/15/22 Acute blood loss anemia due to upper GI bleeding   Iron deficiency   - s/p lovenox 40 3/14  - d/sherice   - Cont Protonix drip- change to IVP   - s/p  2 Units PRBC  - SKIN TEAR at GJ site : causing the anemia   - above repaired, H&H stable, no further bleeding  - c/w TF as per nutritional recs      Mild leukocytosis  , resolved   LLL opacity suspected Pneumonia possible aspiration  On CT abd there was suspicion of  LLL PNA and was on ZOsyn  CT chest does not show any consolidation - therefore there is no pneumonia and will stop Zosyn  There is possible discitis on CT imaging and will get MRI to r/o DISCITIS/OM vs Metastatic lesion   Discussed with DR Lewis and started on Doxy for possible discitis - MRI T SPINE confirms this   Patient has a MIDLINE (he came with it)  and plan to  dc especially in light of IVDA - this fell off on 3/19  3/27 check O2 on ambulation for need of O2 , CXR 3/25- clear     DM type2 with A1C 9.9 with hypoglycemic episode on 3/15   on PO Diet, resume low dose lantus and titrate up as tolerated  hold TFs tonight as patient nauseous and abdo full after eating today   - c/w  lantus 8--> 12 --> 15   - add pre-meal insulin, c/w correctional      Abnormal Weight Loss   Severe Protein Calorie Malnutrition/ Cachexia      Severe hypoalbuminemia  Hyponatremia due to  poor po intake    - now off IVFs; is on PO diet and he's eating well; TFs are only supplementary during the night   - recalculated Na 134 due to hyperglycemia, monitor     IVDA/ Opioid Dependence  - Patient was on methadone 60 mg daily, discussed with [facility] Dr Hernandez - will place on methadone 25 TID.     - DC higher dose morphine po as he has been lethargic     3/25  Chest pain, Epigastric pain due to GERD ruled out ACS   - c/w PPI , ruled out  ACS, worsening of pancreatitis   - zofran prn     Dizziness , Cerebrovascular disease with old lacunar infarctions   - CT head - multiple old lacunar infarction  - check lipid profile  - would add ASA 81 enteric coated     Constipation  - c/w miralax, add dulcolax    DVT Proph:  resume LOVENOX 40     DISPO - on PO and TF, d/c planning pending insurance authorizations , check covid test today

## 2022-03-27 NOTE — PROGRESS NOTE ADULT - ASSESSMENT
55yo male with chronic pancreatitis    alk phos fluctuating up and down, I am not concerned at this time as likely chronic   other labs stable and no increased discomfort    continue bowel regimen for constipation    will not follow daily, please call with questions    d/w dr coon

## 2022-03-27 NOTE — PROGRESS NOTE ADULT - SUBJECTIVE AND OBJECTIVE BOX
Patient is a 56y old  Male who presents with a chief complaint of abdominal pain-- transfer for possible ERCP (26 Mar 2022 15:37)    HPI:  pt feeling ok today  notes only constipation without significant abdominal pain      MEDICATIONS  (STANDING):  aspirin enteric coated 81 milliGRAM(s) Oral daily  bisacodyl Suppository 10 milliGRAM(s) Rectal <User Schedule>  dextrose 40% Gel 15 Gram(s) Oral once  dextrose 5%. 1000 milliLiter(s) (50 mL/Hr) IV Continuous <Continuous>  dextrose 5%. 1000 milliLiter(s) (100 mL/Hr) IV Continuous <Continuous>  dextrose 50% Injectable 25 Gram(s) IV Push once  dextrose 50% Injectable 12.5 Gram(s) IV Push once  dextrose 50% Injectable 25 Gram(s) IV Push once  doxycycline hyclate Capsule 100 milliGRAM(s) Oral every 12 hours  enoxaparin Injectable 40 milliGRAM(s) SubCutaneous every 24 hours  folic acid 1 milliGRAM(s) Oral daily  glucagon  Injectable 1 milliGRAM(s) IntraMuscular once  insulin glargine Injectable (LANTUS) 15 Unit(s) SubCutaneous every morning  insulin lispro (ADMELOG) corrective regimen sliding scale   SubCutaneous three times a day before meals  insulin lispro (ADMELOG) corrective regimen sliding scale   SubCutaneous at bedtime  insulin lispro Injectable (ADMELOG) 2 Unit(s) SubCutaneous three times a day before meals  methadone    Tablet 25 milliGRAM(s) Oral every 8 hours  multivitamin 1 Tablet(s) Oral daily  pancrelipase (ZENPEP 20,000 Lipase Units) 1 Capsule(s) Oral three times a day with meals  pantoprazole   Suspension 40 milliGRAM(s) Oral two times a day  polyethylene glycol 3350 17 Gram(s) Oral two times a day  thiamine 100 milliGRAM(s) Oral daily    MEDICATIONS  (PRN):  acetaminophen     Tablet .. 650 milliGRAM(s) Oral every 6 hours PRN Temp greater or equal to 38C (100.4F), Mild Pain (1 - 3)  ALBUTerol    90 MICROgram(s) HFA Inhaler 2 Puff(s) Inhalation every 6 hours PRN Bronchospasm  aluminum hydroxide/magnesium hydroxide/simethicone Suspension 30 milliLiter(s) Oral every 4 hours PRN Dyspepsia  dextrose Oral Gel 15 Gram(s) Oral once PRN Blood Glucose LESS THAN 70 milliGRAM(s)/deciliter  melatonin 3 milliGRAM(s) Oral at bedtime PRN Insomnia  morphine   Solution 15 milliGRAM(s) Oral every 4 hours PRN Moderate Pain (4 - 6)  ondansetron   Disintegrating Tablet 4 milliGRAM(s) Oral every 6 hours PRN Nausea and/or Vomiting  ondansetron Injectable 4 milliGRAM(s) IV Push every 8 hours PRN Nausea and/or Vomiting  zolpidem 5 milliGRAM(s) Oral at bedtime PRN Insomnia      Allergies    No Known Allergies    Intolerances        REVIEW OF SYSTEMS:    CONSTITUTIONAL: weakness  RESPIRATORY: No cough, wheezing, hemoptysis; No shortness of breath  CARDIOVASCULAR: No chest pain or palpitations  GASTROINTESTINAL: as above  All other review of systems is negative unless indicated above.    Vital Signs Last 24 Hrs  T(C): 36.6 (27 Mar 2022 09:15), Max: 36.8 (27 Mar 2022 04:13)  T(F): 97.9 (27 Mar 2022 09:15), Max: 98.2 (27 Mar 2022 04:13)  HR: 83 (27 Mar 2022 09:15) (70 - 83)  BP: 136/75 (27 Mar 2022 09:15) (129/79 - 140/81)  BP(mean): --  RR: 18 (27 Mar 2022 09:15) (15 - 18)  SpO2: 98% (27 Mar 2022 09:15) (98% - 100%)    PHYSICAL EXAM:  Constitutional: NAD, thin male  Gastrointestinal: BS+, soft, mildly distended      LABS:                        9.2    9.17  )-----------( 436      ( 27 Mar 2022 07:24 )             29.5     03-27    133<L>  |  98  |  16  ----------------------------<  268<H>  5.0   |  33<H>  |  0.45<L>    Ca    8.7      27 Mar 2022 07:24    TPro  7.8  /  Alb  1.8<L>  /  TBili  0.8  /  DBili  x   /  AST  95<H>  /  ALT  152<H>  /  AlkPhos  1616<H>  03-27      LIVER FUNCTIONS - ( 27 Mar 2022 07:24 )  Alb: 1.8 g/dL / Pro: 7.8 gm/dL / ALK PHOS: 1616 U/L / ALT: 152 U/L / AST: 95 U/L / GGT: x             RADIOLOGY & ADDITIONAL STUDIES:

## 2022-03-28 ENCOUNTER — TRANSCRIPTION ENCOUNTER (OUTPATIENT)
Age: 57
End: 2022-03-28

## 2022-03-28 LAB
ALBUMIN SERPL ELPH-MCNC: 1.9 G/DL — LOW (ref 3.3–5)
ALP SERPL-CCNC: 1505 U/L — HIGH (ref 40–120)
ALT FLD-CCNC: 139 U/L — HIGH (ref 12–78)
ANION GAP SERPL CALC-SCNC: 3 MMOL/L — LOW (ref 5–17)
AST SERPL-CCNC: 92 U/L — HIGH (ref 15–37)
BILIRUB SERPL-MCNC: 0.8 MG/DL — SIGNIFICANT CHANGE UP (ref 0.2–1.2)
BUN SERPL-MCNC: 14 MG/DL — SIGNIFICANT CHANGE UP (ref 7–23)
CALCIUM SERPL-MCNC: 8.5 MG/DL — SIGNIFICANT CHANGE UP (ref 8.5–10.1)
CHLORIDE SERPL-SCNC: 97 MMOL/L — SIGNIFICANT CHANGE UP (ref 96–108)
CO2 SERPL-SCNC: 32 MMOL/L — HIGH (ref 22–31)
CREAT SERPL-MCNC: 0.39 MG/DL — LOW (ref 0.5–1.3)
EGFR: 130 ML/MIN/1.73M2 — SIGNIFICANT CHANGE UP
GLUCOSE SERPL-MCNC: 191 MG/DL — HIGH (ref 70–99)
HCT VFR BLD CALC: 28.3 % — LOW (ref 39–50)
HGB BLD-MCNC: 8.9 G/DL — LOW (ref 13–17)
MCHC RBC-ENTMCNC: 27.6 PG — SIGNIFICANT CHANGE UP (ref 27–34)
MCHC RBC-ENTMCNC: 31.4 GM/DL — LOW (ref 32–36)
MCV RBC AUTO: 87.9 FL — SIGNIFICANT CHANGE UP (ref 80–100)
PLATELET # BLD AUTO: 445 K/UL — HIGH (ref 150–400)
POTASSIUM SERPL-MCNC: 4.3 MMOL/L — SIGNIFICANT CHANGE UP (ref 3.5–5.3)
POTASSIUM SERPL-SCNC: 4.3 MMOL/L — SIGNIFICANT CHANGE UP (ref 3.5–5.3)
PROT SERPL-MCNC: 7.5 GM/DL — SIGNIFICANT CHANGE UP (ref 6–8.3)
RBC # BLD: 3.22 M/UL — LOW (ref 4.2–5.8)
RBC # FLD: 17.9 % — HIGH (ref 10.3–14.5)
SODIUM SERPL-SCNC: 132 MMOL/L — LOW (ref 135–145)
WBC # BLD: 6.9 K/UL — SIGNIFICANT CHANGE UP (ref 3.8–10.5)
WBC # FLD AUTO: 6.9 K/UL — SIGNIFICANT CHANGE UP (ref 3.8–10.5)

## 2022-03-28 PROCEDURE — 99232 SBSQ HOSP IP/OBS MODERATE 35: CPT

## 2022-03-28 RX ORDER — POLYETHYLENE GLYCOL 3350 17 G/17G
17 POWDER, FOR SOLUTION ORAL
Qty: 0 | Refills: 0 | DISCHARGE
Start: 2022-03-28

## 2022-03-28 RX ORDER — COLLAGENASE CLOSTRIDIUM HIST. 250 UNIT/G
1 OINTMENT (GRAM) TOPICAL
Qty: 0 | Refills: 0 | DISCHARGE

## 2022-03-28 RX ORDER — ENOXAPARIN SODIUM 100 MG/ML
0 INJECTION SUBCUTANEOUS
Qty: 0 | Refills: 0 | DISCHARGE

## 2022-03-28 RX ORDER — ENOXAPARIN SODIUM 100 MG/ML
9 INJECTION SUBCUTANEOUS
Qty: 0 | Refills: 0 | DISCHARGE

## 2022-03-28 RX ORDER — IBUPROFEN 200 MG
1 TABLET ORAL
Qty: 0 | Refills: 0 | DISCHARGE

## 2022-03-28 RX ORDER — METHADONE HYDROCHLORIDE 40 MG/1
60 TABLET ORAL
Qty: 0 | Refills: 0 | DISCHARGE

## 2022-03-28 RX ORDER — MORPHINE SULFATE 50 MG/1
7.5 CAPSULE, EXTENDED RELEASE ORAL
Qty: 0 | Refills: 0 | DISCHARGE
Start: 2022-03-28

## 2022-03-28 RX ORDER — MAGNESIUM HYDROXIDE 400 MG/1
30 TABLET, CHEWABLE ORAL
Qty: 0 | Refills: 0 | DISCHARGE
Start: 2022-03-28

## 2022-03-28 RX ORDER — DOCUSATE SODIUM 100 MG
2 CAPSULE ORAL
Qty: 0 | Refills: 0 | DISCHARGE

## 2022-03-28 RX ORDER — OMEPRAZOLE 10 MG/1
1 CAPSULE, DELAYED RELEASE ORAL
Qty: 0 | Refills: 0 | DISCHARGE

## 2022-03-28 RX ORDER — INSULIN ASPART 100 [IU]/ML
5 INJECTION, SOLUTION SUBCUTANEOUS
Qty: 0 | Refills: 0 | DISCHARGE

## 2022-03-28 RX ORDER — PANTOPRAZOLE SODIUM 20 MG/1
40 TABLET, DELAYED RELEASE ORAL
Qty: 0 | Refills: 0 | DISCHARGE
Start: 2022-03-28

## 2022-03-28 RX ORDER — INSULIN ASPART 100 [IU]/ML
2 INJECTION, SOLUTION SUBCUTANEOUS
Qty: 0 | Refills: 0 | DISCHARGE

## 2022-03-28 RX ORDER — HEPARIN SODIUM 5000 [USP'U]/ML
1 INJECTION INTRAVENOUS; SUBCUTANEOUS
Qty: 0 | Refills: 0 | DISCHARGE

## 2022-03-28 RX ORDER — ENOXAPARIN SODIUM 100 MG/ML
15 INJECTION SUBCUTANEOUS
Qty: 0 | Refills: 0 | DISCHARGE

## 2022-03-28 RX ORDER — INSULIN LISPRO 100/ML
2 VIAL (ML) SUBCUTANEOUS
Qty: 0 | Refills: 0 | DISCHARGE
Start: 2022-03-28

## 2022-03-28 RX ORDER — ATORVASTATIN CALCIUM 80 MG/1
1 TABLET, FILM COATED ORAL
Qty: 0 | Refills: 0 | DISCHARGE

## 2022-03-28 RX ADMIN — Medication 100 MILLIGRAM(S): at 09:25

## 2022-03-28 RX ADMIN — Medication 2: at 12:11

## 2022-03-28 RX ADMIN — Medication 100 MILLIGRAM(S): at 22:44

## 2022-03-28 RX ADMIN — Medication 2: at 08:01

## 2022-03-28 RX ADMIN — Medication 1 CAPSULE(S): at 16:43

## 2022-03-28 RX ADMIN — Medication 81 MILLIGRAM(S): at 09:25

## 2022-03-28 RX ADMIN — MORPHINE SULFATE 15 MILLIGRAM(S): 50 CAPSULE, EXTENDED RELEASE ORAL at 13:29

## 2022-03-28 RX ADMIN — MORPHINE SULFATE 15 MILLIGRAM(S): 50 CAPSULE, EXTENDED RELEASE ORAL at 06:29

## 2022-03-28 RX ADMIN — Medication 1 TABLET(S): at 09:27

## 2022-03-28 RX ADMIN — Medication 2 UNIT(S): at 12:11

## 2022-03-28 RX ADMIN — METHADONE HYDROCHLORIDE 25 MILLIGRAM(S): 40 TABLET ORAL at 22:44

## 2022-03-28 RX ADMIN — METHADONE HYDROCHLORIDE 25 MILLIGRAM(S): 40 TABLET ORAL at 14:59

## 2022-03-28 RX ADMIN — Medication 1 CAPSULE(S): at 08:02

## 2022-03-28 RX ADMIN — MORPHINE SULFATE 15 MILLIGRAM(S): 50 CAPSULE, EXTENDED RELEASE ORAL at 21:13

## 2022-03-28 RX ADMIN — METHADONE HYDROCHLORIDE 25 MILLIGRAM(S): 40 TABLET ORAL at 06:29

## 2022-03-28 RX ADMIN — Medication 1 MILLIGRAM(S): at 09:25

## 2022-03-28 RX ADMIN — MORPHINE SULFATE 15 MILLIGRAM(S): 50 CAPSULE, EXTENDED RELEASE ORAL at 20:13

## 2022-03-28 RX ADMIN — PANTOPRAZOLE SODIUM 40 MILLIGRAM(S): 20 TABLET, DELAYED RELEASE ORAL at 09:25

## 2022-03-28 RX ADMIN — Medication 5 MILLIGRAM(S): at 09:25

## 2022-03-28 RX ADMIN — PANTOPRAZOLE SODIUM 40 MILLIGRAM(S): 20 TABLET, DELAYED RELEASE ORAL at 22:51

## 2022-03-28 RX ADMIN — ENOXAPARIN SODIUM 40 MILLIGRAM(S): 100 INJECTION SUBCUTANEOUS at 09:26

## 2022-03-28 RX ADMIN — Medication 1 CAPSULE(S): at 12:12

## 2022-03-28 RX ADMIN — Medication 2 UNIT(S): at 08:01

## 2022-03-28 RX ADMIN — Medication 5 MILLIGRAM(S): at 22:44

## 2022-03-28 RX ADMIN — INSULIN GLARGINE 15 UNIT(S): 100 INJECTION, SOLUTION SUBCUTANEOUS at 08:02

## 2022-03-28 RX ADMIN — MORPHINE SULFATE 15 MILLIGRAM(S): 50 CAPSULE, EXTENDED RELEASE ORAL at 07:02

## 2022-03-28 RX ADMIN — Medication 2 UNIT(S): at 16:43

## 2022-03-28 NOTE — DISCHARGE NOTE PROVIDER - NSDCMRMEDTOKEN_GEN_ALL_CORE_FT
Albuterol (Eqv-Proventil HFA) 90 mcg/inh inhalation aerosol: 2 puff(s) inhaled every 4 hours  aspirin 81 mg oral tablet, chewable: 1 tab(s) orally once a day  bisacodyl 5 mg oral delayed release tablet: 1 tab(s) orally every 12 hours  Creon 12,000 units oral delayed release capsule: 1 cap(s) orally 3 times a day  ***take with Creon 24,000 = 36,000***  Creon 24,000 units oral delayed release capsule: 1 cap(s) orally 3 times a day  ***take with 12,000 = 36,000***  doxycycline monohydrate 100 mg oral capsule: 1 cap(s) orally every 12 hours until 4/28/22 for discitis  folic acid 1 mg oral tablet: 1 tab(s) orally once a day  insulin lispro 100 units/mL injectable solution: 2 unit(s) injectable 3 times a day   as per sliding scale   Lantus Solostar Pen 100 units/mL subcutaneous solution: 15 unit(s) subcutaneous once a day in am  magnesium hydroxide 8% oral suspension: 30 milliliter(s) orally once a day, As needed, Constipation  morphine 10 mg/5 mL oral solution: 7.5 milliliter(s) orally every 4 hours, As needed, Moderate Pain (4 - 6)  Multiple Vitamins oral tablet: 1 tab(s) orally once a day  NovoLOG FlexPen 100 units/mL injectable solution: 2 unit(s) injectable 3 times a day  pantoprazole 40 mg oral granule, delayed release: 40 milligram(s) orally 2 times a day  polyethylene glycol 3350 oral powder for reconstitution: 17 gram(s) orally 2 times a day  Tylenol 325 mg oral tablet: 2 tab(s) orally 30 minutes before dressing change  Vitamin B1 100 mg oral tablet: 1 tab(s) orally once a day

## 2022-03-28 NOTE — DISCHARGE NOTE PROVIDER - PROVIDER TOKENS
FREE:[LAST:[PCP],PHONE:[(   )    -],FAX:[(   )    -],FOLLOWUP:[1 week]],PROVIDER:[TOKEN:[[id number]],FOLLOWUP:[1 week]]

## 2022-03-28 NOTE — DISCHARGE NOTE PROVIDER - DETAILS OF MALNUTRITION DIAGNOSIS/DIAGNOSES
This patient has been assessed with a concern for Malnutrition and was treated during this hospitalization for the following Nutrition diagnosis/diagnoses:     -  03/15/2022: Severe protein-calorie malnutrition   -  03/15/2022: Underweight (BMI < 19)

## 2022-03-28 NOTE — DISCHARGE NOTE PROVIDER - INSTRUCTIONS
tube feeding Jejunostomy - Glucerna 1.5 al total volume 240 cc, 20 cc /hour for 12 hours   start time 18:00- stop time 06:00 with free water flush 25 cc every hour for 12 hours  with oral glucerna shakes can 2 times a day

## 2022-03-28 NOTE — PROGRESS NOTE ADULT - SUBJECTIVE AND OBJECTIVE BOX
cc - abdominal pain    57 y/o male with PMHX of IVDA with heroin(  last used 2 months ago ) , opioid dependence on methadone, IDDM, chronic pancreatitis, and recent significant weight loss over the last 1 year who was a transfer from [facility] on 3/14/22  for possible ERCP.  Patient initially presented to outside hospital for worsening abdominal pain.  Patient had CT/ MRI/ US of the abdomen which showed chronic pancreatitis, gastric distention (for which he had an NGT),  intra/ extra hepatic biliary ductal dilatation, and fluid collection/ ? mass in the head of the pancreas compressing the SMV and proximal portal vein.  Patient was transferred to  for possible need for ERCP and further GI evaluation.  Upon admission, patient with significantly elevated ALKPHOS 1200.      Hospital course :   3/23 - no cp palps sob; some abdo fullness after eating - felt nauseous today, discussed with RN - will hold TFs tonight   3/24 - sleepy today - stop higher dose morphine; resume TFs tonight at lowered goal   3/25 - pt seen and examined, + epigastric pain in am, dizziness, + nausea, dyspnea, plan discussed   3/26 - pt seen and examined, epigastric pain better today, denies dyspnea, tolerating po intake, afebrile, plan discussed   3/27 -pt seen and examined , feels better pain better controlled, afebrile , on O2 supplementation plan discussed   3/28 - pt seen and examined, denies cp, + epigastric pain controlled with medications, afebrile, tolerating po intake    Review of system- Rest of the review of system are negative except mentioned in HPI    Vitals reviewed for last 24 hours  Vitals reviewed for last 24 hours  T(C): 36.7 (03-28-22 @ 15:20), Max: 36.8 (03-27-22 @ 21:26)  T(F): 98.1 (03-28-22 @ 15:20), Max: 98.2 (03-27-22 @ 21:26)  HR: 72 (03-28-22 @ 15:20) (70 - 82)  BP: 140/66 (03-28-22 @ 15:20) (135/65 - 150/84)  RR: 18 (03-28-22 @ 15:20) (18 - 19)  SpO2: 100% (03-28-22 @ 15:20) (99% - 100%)  Wt(kg): --    PHYSICAL EXAM:  GENERAL: cachectic,  NAD  HEAD:  Atraumatic, Normocephalic  EYES: EOMI, PERRLA, conjunctiva and sclera clear  NECK: Supple, No JVD  NERVOUS SYSTEM:  Alert & Oriented X2, moves all extremities  CHEST/LUNG: Clear to auscultation bilaterally; No rales, rhonchi, wheezing, or rubs  HEART: Regular rate and rhythm; No murmurs, rubs, or gallops  ABDOMEN: Soft, fullness, +BS, GJ tube present, bleeding resolved   GENITOURINARY- Voiding, no palpable bladder  EXTREMITIES:  2+ Peripheral Pulses, No clubbing, cyanosis, or edema  MUSCULOSKELETAL No muscle tenderness, Muscle tone normal, No joint tenderness, no Joint swelling, Joint range of motion-normal    RADIOLOGY & ADDITIONAL TESTS: all reviewed      Xray Chest 1 View-PORTABLE IMMEDIATE (Xray Chest 1 View-PORTABLE IMMEDIATE .) (03.25.22 @ 12:56) >  FINDINGS: Cardiac silhouette and pulmonary vasculature are within normal   limits with no consolidation, effusion, gross adenopathy, pneumothorax or   acute osseous finding.    IMPRESSION:  No acute radiographic findings and no change     CT Head No Cont (03.25.22 @ 12:34) >  FINDINGS:   No previous examinations are available for review.    The brain demonstrates mild anterior periventricular white matter   ischemia with multiple old lacunar infarctions in the BILATERAL basal   ganglia and thalami.   No acute cerebral cortical infarct is seen.  No   intracranial hemorrhage is found.  No mass effect is found in the brain.    The ventricles, sulci and basal cisterns appear unremarkable.    The orbits are unremarkable.  The paranasal sinuses are clear.  The nasal   cavity appears intact.  The nasopharynx is symmetric.  The central skull   base, petrous temporal bones and calvarium remain intact.      IMPRESSION:   mild anterior periventricular white matter ischemia with   multiple old lacunar infarctions in the BILATERAL basal ganglia and   thalami.        MR MRCP w/wo IV Cont (03.15.22 @ 15:44) >  Pancreatic head mass suspicious for neoplasm with associated biliary and   pancreatic ductal dilatation. Recommend biopsy.  Upper abdominal lymphadenopathy  Distended stomach. Recommend clinical correlation for partial gastric   outlet obstruction    CT ABDOMEN AND PELVIS IC                        IMPRESSION:  Chronic pancreatitis.  Pancreatic ductal dilatation due to multiple intraductal stones.  Biliary ductal dilatation the exact etiology which could be due to post   pancreatic stricture. Consider further evaluation with MRI or endoscopic   ultrasound as an occult pancreatic mass is not excluded.  Cystic pancreatic lesions most likely representing pseudocysts.  Cholelithiasis and marked gallbladder distention, without evidence of   acute cholecystitis.  Mild left lower lobe opacity suspicious for pneumonia.  Enteric tube with tip in the distal esophagus.    < from: MR Thoracic Spine w/wo IV Cont (03.19.22 @ 12:36) >  IMPRESSION:  Acute osteomyelitis/discitis identified at T6-7 with   enhancement and increased signal within the inferior T6 and superior T7   endplates and within the intervening disc. Enhancement in the   paravertebral soft tissues at this level and ventral epidural space is   noted, LEFT greater than RIGHT.    LABS: All Labs Reviewed:       03-28    132<L>  |  97  |  14  ----------------------------<  191<H>  4.3   |  32<H>  |  0.39<L>    Ca    8.5      28 Mar 2022 07:08    TPro  7.5  /  Alb  1.9<L>  /  TBili  0.8  /  DBili  x   /  AST  92<H>  /  ALT  139<H>  /  AlkPhos  1505<H>  03-28                            8.9    6.90  )-----------( 445      ( 28 Mar 2022 07:08 )             28.3             LIVER FUNCTIONS - ( 28 Mar 2022 07:08 )  Alb: 1.9 g/dL / Pro: 7.5 gm/dL / ALK PHOS: 1505 U/L / ALT: 139 U/L / AST: 92 U/L / GGT: x                   03-27    133<L>  |  98  |  16  ----------------------------<  268<H>  5.0   |  33<H>  |  0.45<L>    Ca    8.7      27 Mar 2022 07:24    TPro  7.8  /  Alb  1.8<L>  /  TBili  0.8  /  DBili  x   /  AST  95<H>  /  ALT  152<H>  /  AlkPhos  1616<H>  03-27                            9.2    9.17  )-----------( 436      ( 27 Mar 2022 07:24 )             29.5     LIVER FUNCTIONS - ( 27 Mar 2022 07:24 )  Alb: 1.8 g/dL / Pro: 7.8 gm/dL / ALK PHOS: 1616 U/L / ALT: 152 U/L / AST: 95 U/L / GGT: x                 03-26    129<L>  |  93<L>  |  11  ----------------------------<  298<H>  4.9   |  32<H>  |  0.48<L>    Ca    8.9      26 Mar 2022 06:45  Mg     1.6     03-25    TPro  7.4  /  Alb  1.8<L>  /  TBili  0.9  /  DBili  x   /  AST  65<H>  /  ALT  158<H>  /  AlkPhos  1547<H>  03-26                        8.8    8.71  )-----------( 432      ( 26 Mar 2022 06:45 )             27.9       CARDIAC MARKERS ( 25 Mar 2022 13:00 )  x     / x     / 38 U/L / x     / x            LIVER FUNCTIONS - ( 26 Mar 2022 06:45 )  Alb: 1.8 g/dL / Pro: 7.4 gm/dL / ALK PHOS: 1547 U/L / ALT: 158 U/L / AST: 65 U/L / GGT: x         < from: Xray Chest 1 View-PORTABLE IMMEDIATE (Xray Chest 1 View-PORTABLE IMMEDIATE .) (03.25.22 @ 12:56) >  FINDINGS: Cardiac silhouette and pulmonary vasculature are within normal   limits with no consolidation, effusion, gross adenopathy, pneumothorax or   acute osseous finding.    IMPRESSION:  No acute radiographic findings and no change    < end of copied text >        MEDS:   acetaminophen     Tablet .. 650 milliGRAM(s) Oral every 6 hours PRN  ALBUTerol    90 MICROgram(s) HFA Inhaler 2 Puff(s) Inhalation every 6 hours PRN  aluminum hydroxide/magnesium hydroxide/simethicone Suspension 30 milliLiter(s) Oral every 4 hours PRN  bisacodyl Suppository 10 milliGRAM(s) Rectal <User Schedule>  doxycycline hyclate Capsule 100 milliGRAM(s) Oral every 12 hours  enoxaparin Injectable 40 milliGRAM(s) SubCutaneous every 24 hours  folic acid 1 milliGRAM(s) Oral daily  insulin glargine Injectable (LANTUS) 8 Unit(s) SubCutaneous every morning  insulin lispro (ADMELOG) corrective regimen sliding scale   SubCutaneous four times a day before meals  melatonin 3 milliGRAM(s) Oral at bedtime PRN  methadone    Tablet 25 milliGRAM(s) Oral every 8 hours  morphine   Solution 15 milliGRAM(s) Oral every 4 hours PRN  multivitamin 1 Tablet(s) Oral daily  ondansetron Injectable 4 milliGRAM(s) IV Push every 8 hours PRN  pancrelipase (ZENPEP 20,000 Lipase Units) 1 Capsule(s) Oral three times a day with meals  pantoprazole  Injectable 40 milliGRAM(s) IV Push every 12 hours  polyethylene glycol 3350 17 Gram(s) Oral two times a day  thiamine 100 milliGRAM(s) Oral daily  zolpidem 5 milliGRAM(s) Oral at bedtime PRN

## 2022-03-28 NOTE — DISCHARGE NOTE PROVIDER - HOSPITAL COURSE
cc - abdominal pain    55 y/o male with PMHX of IVDA with heroin(  last used 2 months ago ) , opioid dependence on methadone, IDDM, chronic pancreatitis, and recent significant weight loss over the last 1 year who was a transfer from [facility] on 3/14/22  for possible ERCP.  Patient initially presented to outside hospital for worsening abdominal pain.  Patient had CT/ MRI/ US of the abdomen which showed chronic pancreatitis, gastric distention (for which he had an NGT),  intra/ extra hepatic biliary ductal dilatation, and fluid collection/ ? mass in the head of the pancreas compressing the SMV and proximal portal vein.  Patient was transferred to  for possible need for ERCP and further GI evaluation.  Upon admission, patient with significantly elevated ALKPHOS 1200.      Hospital course :   3/23 - no cp palps sob; some abdo fullness after eating - felt nauseous today, discussed with RN - will hold TFs tonight   3/24 - sleepy today - stop higher dose morphine; resume TFs tonight at lowered goal   3/25 - pt seen and examined, + epigastric pain in am, dizziness, + nausea, dyspnea, plan discussed   3/26 - pt seen and examined, epigastric pain better today, denies dyspnea, tolerating po intake, afebrile, plan discussed   3/27 -pt seen and examined , feels better pain better controlled, afebrile , on O2 supplementation plan discussed   3/28 - pt seen and examined, oob in the chair, does not need O2 supplementation, afebrile, plan discussed     Review of system- Rest of the review of system are negative except mentioned in HPI  Vitals reviewed for last 24 hours  T(C): 36.7 (03-28-22 @ 09:19), Max: 36.8 (03-27-22 @ 21:26)  T(F): 98.1 (03-28-22 @ 09:19), Max: 98.2 (03-27-22 @ 21:26)  HR: 81 (03-28-22 @ 09:19) (77 - 82)  BP: 136/68 (03-28-22 @ 09:19) (136/68 - 150/84)  RR: 18 (03-28-22 @ 09:19) (18 - 19)  SpO2: 100% (03-28-22 @ 09:19) (98% - 100%)  Wt(kg): --  RADIOLOGY & ADDITIONAL TESTS: all reviewed   LABS: All Labs Reviewed:         A/P   Epigastric abdominal pain due to Acute on chronic pancreatitis   Pancreatic mass,  associated with biliary and pancreatic duct dilatation , pancreatic stones with underlying chronic pancreatitis s/p ERCP, EUS s/p FNA consistent with pancreatitis and fibrosis, malignancy ruled out   Partial gastric outlet obstruction due to severe duodenal inflammation s/p NGT now s/p GJ tube   Cholelithiasis and marked gallbladder distention without acute cholecystitis  Elevated CA 19-9 , non-specific , biopsy negative for malignancy   - 3/16/22 - s/p EUS and FNA of the pancreatic mass , ERCP, pancreatic sphincterotomy , s/p pancreatic stent , s/p GJ tube   - s/p GJ tube- resume TFs with recreational PO feeds   - watch for refeeding syndrome; replace Mg/Phos PRN   -  complete workup with tumor markers/CT chest/Heme-Onc Cx - results noted - no mets to chest  - biopsy results consistent with inflammation and fibrosis of the pancreas   - outpatient follow up with GI within 2-3 weeks  Hematemesis 3/15/22 Acute blood loss anemia due to upper GI bleeding , Iron deficiency   - s/p lovenox 40 3/14  - d/sherice   - Cont Protonix drip- change to IVP --> po   - s/p  2 Units PRBC  - SKIN TEAR at GJ site : causing the anemia   - above repaired, H&H stable, no further bleeding  - c/w TF as per nutritional recs    Mild leukocytosis  , resolved   LLL opacity suspected Pneumonia possible aspiration  T6-T7 vertebral bodies erosions consisted with discitis   On CT abd there was suspicion of  LLL PNA and was on ZOsyn  CT chest does not show any consolidation - therefore there is no pneumonia and will stop Zosyn  There is possible discitis on CT imaging and will get MRI to r/o DISCITIS/OM vs Metastatic lesion   Discussed with DR Lewis and started on Doxy for possible discitis - MRI T SPINE confirms this   Patient has a MIDLINE (he came with it)  and plan to  dc especially in light of IVDA - this fell off on 3/19  3/27 check O2 on ambulation for need of O2 , CXR 3/25- clear   DM type2 with A1C 9.9 with hypoglycemic episode on 3/15   on PO Diet, resume low dose lantus and titrate up as tolerated  hold TFs tonight as patient nauseous and abdo full after eating today   - c/w  lantus 8--> 12 --> 15   - add pre-meal insulin, c/w correctional    Abnormal Weight Loss   Severe Protein Calorie Malnutrition/ Cachexia      Severe hypoalbuminemia  Hyponatremia due to  poor po intake    - now off IVFs; is on PO diet and he's eating well; TFs are only supplementary during the night   - recalculated Na 134 due to hyperglycemia, monitor   IVDA/ Opioid Dependence  - Patient was on methadone 60 mg daily, discussed with [facility] Dr Hernandez - will place on methadone 25 TID.     - DC higher dose morphine po as he has been lethargic   3/25  Chest pain, Epigastric pain due to GERD ruled out ACS   - c/w PPI , ruled out  ACS, worsening of pancreatitis   - zofran prn   Dizziness , Cerebrovascular disease with old lacunar infarctions   - CT head - multiple old lacunar infarction  - check lipid profile  - would add ASA 81 enteric coated   Constipation - c/w miralax, add dulcolax  DVT Proph:  resume LOVENOX 40   DISPO - on PO and TF, d/c planning pending insurance authorizations , check covid test today  Disposition - medically optimized to be discharged home with close follow up with PCP, GI within 1 week   complete antibiotics  return to ED if fever, abdominal pain, nausea, vomiting, chest pain, dyspnea  Discharge plan discussed with patient, RN  Patient advised to follow up with PCP within 3-7 days  time spend 40 min     cc - abdominal pain    57 y/o male with PMHX of IVDA with heroin(  last used 2 months ago ) , opioid dependence on methadone, IDDM, chronic pancreatitis, and recent significant weight loss over the last 1 year who was a transfer from [facility] on 3/14/22  for possible ERCP.  Patient initially presented to outside hospital for worsening abdominal pain.  Patient had CT/ MRI/ US of the abdomen which showed chronic pancreatitis, gastric distention (for which he had an NGT),  intra/ extra hepatic biliary ductal dilatation, and fluid collection/ ? mass in the head of the pancreas compressing the SMV and proximal portal vein.  Patient was transferred to  for possible need for ERCP and further GI evaluation.  Upon admission, patient with significantly elevated ALKPHOS 1200.      Hospital course :   3/23 - no cp palps sob; some abdo fullness after eating - felt nauseous today, discussed with RN - will hold TFs tonight   3/24 - sleepy today - stop higher dose morphine; resume TFs tonight at lowered goal   3/25 - pt seen and examined, + epigastric pain in am, dizziness, + nausea, dyspnea, plan discussed   3/26 - pt seen and examined, epigastric pain better today, denies dyspnea, tolerating po intake, afebrile, plan discussed   3/27 -pt seen and examined , feels better pain better controlled, afebrile , on O2 supplementation plan discussed   3/28 - pt seen and examined, oob in the chair, does not need O2 supplementation, afebrile, plan discussed   3/29 - pt seen and examined , denies cp, dyspnea, abdominal pain well controlled    Review of system- Rest of the review of system are negative except mentioned in HPI  Vitals reviewed for last 24 hours  Vitals reviewed for last 24 hours  T(C): 36.5 (03-29-22 @ 08:37), Max: 36.5 (03-29-22 @ 08:37)  T(F): 97.7 (03-29-22 @ 08:37), Max: 97.7 (03-29-22 @ 08:37)  HR: 72 (03-29-22 @ 08:37) (72 - 72)  BP: 154/78 (03-29-22 @ 08:37) (137/72 - 154/78)  RR: 18 (03-29-22 @ 08:37) (18 - 18)  SpO2: 100% (03-29-22 @ 08:37) (100% - 100%)  Wt(kg): --  RADIOLOGY & ADDITIONAL TESTS: all reviewed   LABS: All Labs Reviewed:         A/P   Epigastric abdominal pain due to Acute on chronic pancreatitis   Pancreatic mass,  associated with biliary and pancreatic duct dilatation , pancreatic stones with underlying chronic pancreatitis s/p ERCP, EUS s/p FNA consistent with pancreatitis and fibrosis, malignancy ruled out   Partial gastric outlet obstruction due to severe duodenal inflammation s/p NGT now s/p GJ tube   Cholelithiasis and marked gallbladder distention without acute cholecystitis  Elevated CA 19-9 , non-specific , biopsy negative for malignancy   - 3/16/22 - s/p EUS and FNA of the pancreatic mass , ERCP, pancreatic sphincterotomy , s/p pancreatic stent , s/p GJ tube   - s/p GJ tube- resume TFs with recreational PO feeds   - watch for refeeding syndrome; replace Mg/Phos PRN   -  complete workup with tumor markers/CT chest/Heme-Onc Cx - results noted - no mets to chest  - biopsy results consistent with inflammation and fibrosis of the pancreas   - outpatient follow up with GI within 2-3 weeks  Hematemesis 3/15/22 Acute blood loss anemia due to upper GI bleeding , Iron deficiency   - s/p lovenox 40 3/14  - d/sherice   - Cont Protonix drip- change to IVP --> po   - s/p  2 Units PRBC  - SKIN TEAR at GJ site : causing the anemia   - above repaired, H&H stable, no further bleeding  - c/w TF as per nutritional recs    Mild leukocytosis  , resolved   LLL opacity suspected Pneumonia possible aspiration  T6-T7 vertebral bodies erosions consisted with discitis   On CT abd there was suspicion of  LLL PNA and was on ZOsyn  CT chest does not show any consolidation - therefore there is no pneumonia and will stop Zosyn  There is possible discitis on CT imaging and will get MRI to r/o DISCITIS/OM vs Metastatic lesion   Discussed with DR Lewis and started on Doxy for possible discitis - MRI T SPINE confirms this   Patient has a MIDLINE (he came with it)  and plan to  dc especially in light of IVDA - this fell off on 3/19  3/27 check O2 on ambulation for need of O2 , CXR 3/25- clear   DM type2 with A1C 9.9 with hypoglycemic episode on 3/15   on PO Diet, resume low dose lantus and titrate up as tolerated  hold TFs tonight as patient nauseous and abdo full after eating today   - c/w  lantus 8--> 12 --> 15   - add pre-meal insulin, c/w correctional    Abnormal Weight Loss   Severe Protein Calorie Malnutrition/ Cachexia      Severe hypoalbuminemia  Hyponatremia due to  poor po intake    - now off IVFs; is on PO diet and he's eating well; TFs are only supplementary during the night   - recalculated Na 134 due to hyperglycemia, monitor   IVDA/ Opioid Dependence  - Patient was on methadone 60 mg daily, discussed with [facility] Dr Hernandez - will place on methadone 25 TID.     - DC higher dose morphine po as he has been lethargic   3/25  Chest pain, Epigastric pain due to GERD ruled out ACS   - c/w PPI , ruled out  ACS, worsening of pancreatitis   - zofran prn   Dizziness , Cerebrovascular disease with old lacunar infarctions   - CT head - multiple old lacunar infarction  - check lipid profile  - would add ASA 81 enteric coated   Constipation - c/w miralax, add dulcolax  DVT Proph:  resume LOVENOX 40   DISPO - on PO and TF, d/c planning pending insurance authorizations , check covid test today  Disposition - medically optimized to be discharged home with close follow up with PCP, GI within 1 week   complete antibiotics  return to ED if fever, abdominal pain, nausea, vomiting, chest pain, dyspnea  Discharge plan discussed with patient, RN  Patient advised to follow up with PCP within 3-7 days  time spend 40 min

## 2022-03-28 NOTE — PROGRESS NOTE ADULT - ASSESSMENT
55 y/o male with PMHX of IVDA with heroin , opioid dependence on methadone, IDDM, chronic pancreatitis, abnormal weight loss over the last 1 year who was a transfer from [facility] on 3/14/22  for possible ERCP.  Patient initially presented to outside hospital for worsening abdominal pain.  Patient had CT/ MRI/ US of the abdomen which showed chronic pancreatitis, gastric distention (for which he had an NGT),  intra/ extra hepatic biliary ductal dilatation, and fluid collection/  mass in the head of the pancreas compressing the SMV and proximal portal vein.    Upon admission, patient with significantly elevated ALK PHOS 1200.      Epigastric abdominal pain due to   Pancreatic mass,  associated with biliary and pancreatic duct dilatation , pancreatic stones with underlying chronic pancreatitis s/p ERCP, EUS s/p FNA consistent with pancreatitis and fibrosis  Partial gastric outlet obstruction due to severe duodenal inflammation s/p NGT now s/p GJ tube   Cholelithiasis and marked gallbladder distention without acute cholecystitis  Elevated CA 19-9 , non-specific , biopsy negative for malignancy   - 3/16/22 - s/p EUS and FNA of the pancreatic mass , ERCP, pancreatic sphincterotomy , s/p pancreatic stent , s/p GJ tube   - s/p GJ tube- resume TFs with recreational PO feeds   - watch for refeeding syndrome; replace Mg/Phos PRN   -  complete workup with tumor markers/CT chest/Heme-Onc Cx - results noted - no mets to chest  - biopsy results consistent with inflammation and fibrosis of the pancreas     Hematemesis 3/15/22 Acute blood loss anemia due to upper GI bleeding   Iron deficiency   - s/p lovenox 40 3/14  - d/sherice   - Cont Protonix drip- change to IVP   - s/p  2 Units PRBC  - SKIN TEAR at GJ site : causing the anemia   - above repaired, H&H stable, no further bleeding  - c/w TF as per nutritional recs      Mild leukocytosis  , resolved   LLL opacity suspected Pneumonia possible aspiration  On CT abd there was suspicion of  LLL PNA and was on ZOsyn  CT chest does not show any consolidation - therefore there is no pneumonia and will stop Zosyn  There is possible discitis on CT imaging and will get MRI to r/o DISCITIS/OM vs Metastatic lesion   Discussed with DR Lewis and started on Doxy for possible discitis - MRI T SPINE confirms this   Patient has a MIDLINE (he came with it)  and plan to  dc especially in light of IVDA - this fell off on 3/19  3/27 check O2 on ambulation for need of O2 , CXR 3/25- clear     DM type2 with A1C 9.9 with hypoglycemic episode on 3/15   on PO Diet, resume low dose lantus and titrate up as tolerated  hold TFs tonight as patient nauseous and abdo full after eating today   - c/w  lantus 8--> 12 --> 15   - add pre-meal insulin, c/w correctional      Abnormal Weight Loss   Severe Protein Calorie Malnutrition/ Cachexia      Severe hypoalbuminemia  Hyponatremia due to  poor po intake    - now off IVFs; is on PO diet and he's eating well; TFs are only supplementary during the night   - recalculated Na 134 due to hyperglycemia, monitor     IVDA/ Opioid Dependence  - Patient was on methadone 60 mg daily, discussed with [facility] Dr Hernandez - will place on methadone 25 TID.     - DC higher dose morphine po as he has been lethargic     3/25  Chest pain, Epigastric pain due to GERD ruled out ACS   - c/w PPI , ruled out  ACS, worsening of pancreatitis   - zofran prn     Dizziness , Cerebrovascular disease with old lacunar infarctions   - CT head - multiple old lacunar infarction  - check lipid profile  - would add ASA 81 enteric coated     Constipation  - c/w miralax, add dulcolax    DVT Proph:  resume LOVENOX 40     DISPO - on PO and TF, d/c planning pending insurance authorizations , check covid test today

## 2022-03-28 NOTE — DISCHARGE NOTE PROVIDER - CARE PROVIDER_API CALL
PCP,   Phone: (   )    -  Fax: (   )    -  Follow Up Time: 1 week    Wenceslao Lemon)  Gastroenterology; Internal Medicine  [address] [place] [address], [place] [address]  Phone: [phone]  Fax: [phone]  Follow Up Time: 1 week

## 2022-03-28 NOTE — DISCHARGE NOTE PROVIDER - NSDCCPCAREPLAN_GEN_ALL_CORE_FT
PRINCIPAL DISCHARGE DIAGNOSIS  Diagnosis: Pancreatitis  Assessment and Plan of Treatment: s/p ERCP,  pancreatic stent placement, gastric outlet obstruction s/p J tube for tube feeding , follow up with GI within 1 week for further monitoring      SECONDARY DISCHARGE DIAGNOSES  Diagnosis: Pancreatic mass  Assessment and Plan of Treatment: s/p biopsy consistent with pancreatitis    Diagnosis: Severe protein-calorie malnutrition  Assessment and Plan of Treatment: continue tube feedings, follow up with PCP within 1 week for further monitoring and management    Diagnosis: Pneumonia, aspiration  Assessment and Plan of Treatment: completed antibiotics follow up with PCP within 1 week for further monitoring    Diagnosis: Discitis  Assessment and Plan of Treatment: complete doxy 100 mg bid until 4/28/22    Diagnosis: Diabetes mellitus  Assessment and Plan of Treatment: continue with lantus 15 in am, premeal insulin 2 units and slidding scale correctional insulin , follow up with PCP for further preventive care within 1 week    Diagnosis: Polysubstance (including opioids) dependence, daily use  Assessment and Plan of Treatment:     Diagnosis: History of cerebrovascular disease  Assessment and Plan of Treatment: take asa 81, restart statin once liver function improves    Diagnosis: Transaminitis  Assessment and Plan of Treatment: hold statin, restart lower dose once liver enzymes improves

## 2022-03-29 ENCOUNTER — TRANSCRIPTION ENCOUNTER (OUTPATIENT)
Age: 57
End: 2022-03-29

## 2022-03-29 VITALS
SYSTOLIC BLOOD PRESSURE: 156 MMHG | TEMPERATURE: 98 F | OXYGEN SATURATION: 100 % | RESPIRATION RATE: 16 BRPM | HEART RATE: 78 BPM | DIASTOLIC BLOOD PRESSURE: 85 MMHG

## 2022-03-29 PROCEDURE — 99239 HOSP IP/OBS DSCHRG MGMT >30: CPT

## 2022-03-29 RX ADMIN — Medication 1 CAPSULE(S): at 17:52

## 2022-03-29 RX ADMIN — METHADONE HYDROCHLORIDE 25 MILLIGRAM(S): 40 TABLET ORAL at 05:45

## 2022-03-29 RX ADMIN — INSULIN GLARGINE 15 UNIT(S): 100 INJECTION, SOLUTION SUBCUTANEOUS at 08:00

## 2022-03-29 RX ADMIN — Medication 100 MILLIGRAM(S): at 10:15

## 2022-03-29 RX ADMIN — Medication 1 CAPSULE(S): at 08:02

## 2022-03-29 RX ADMIN — METHADONE HYDROCHLORIDE 25 MILLIGRAM(S): 40 TABLET ORAL at 14:23

## 2022-03-29 RX ADMIN — Medication 81 MILLIGRAM(S): at 10:13

## 2022-03-29 RX ADMIN — PANTOPRAZOLE SODIUM 40 MILLIGRAM(S): 20 TABLET, DELAYED RELEASE ORAL at 10:14

## 2022-03-29 RX ADMIN — Medication 1 TABLET(S): at 10:12

## 2022-03-29 RX ADMIN — Medication 5 MILLIGRAM(S): at 10:14

## 2022-03-29 RX ADMIN — Medication 1 CAPSULE(S): at 12:27

## 2022-03-29 RX ADMIN — Medication 1 MILLIGRAM(S): at 10:14

## 2022-03-29 RX ADMIN — Medication 100 MILLIGRAM(S): at 10:14

## 2022-03-29 RX ADMIN — MORPHINE SULFATE 15 MILLIGRAM(S): 50 CAPSULE, EXTENDED RELEASE ORAL at 04:23

## 2022-03-29 RX ADMIN — Medication 2 UNIT(S): at 17:54

## 2022-03-29 RX ADMIN — MORPHINE SULFATE 15 MILLIGRAM(S): 50 CAPSULE, EXTENDED RELEASE ORAL at 16:48

## 2022-03-29 RX ADMIN — Medication 2 UNIT(S): at 12:27

## 2022-03-29 RX ADMIN — ZOLPIDEM TARTRATE 5 MILLIGRAM(S): 10 TABLET ORAL at 00:22

## 2022-03-29 RX ADMIN — ENOXAPARIN SODIUM 40 MILLIGRAM(S): 100 INJECTION SUBCUTANEOUS at 10:15

## 2022-03-29 NOTE — PROGRESS NOTE ADULT - SUBJECTIVE AND OBJECTIVE BOX
cc - abdominal pain    55 y/o male with PMHX of IVDA with heroin(  last used 2 months ago ) , opioid dependence on methadone, IDDM, chronic pancreatitis, and recent significant weight loss over the last 1 year who was a transfer from [facility] on 3/14/22  for possible ERCP.  Patient initially presented to outside hospital for worsening abdominal pain.  Patient had CT/ MRI/ US of the abdomen which showed chronic pancreatitis, gastric distention (for which he had an NGT),  intra/ extra hepatic biliary ductal dilatation, and fluid collection/ ? mass in the head of the pancreas compressing the SMV and proximal portal vein.  Patient was transferred to  for possible need for ERCP and further GI evaluation.  Upon admission, patient with significantly elevated ALKPHOS 1200.      Hospital course :   3/23 - no cp palps sob; some abdo fullness after eating - felt nauseous today, discussed with RN - will hold TFs tonight   3/24 - sleepy today - stop higher dose morphine; resume TFs tonight at lowered goal   3/25 - pt seen and examined, + epigastric pain in am, dizziness, + nausea, dyspnea, plan discussed   3/26 - pt seen and examined, epigastric pain better today, denies dyspnea, tolerating po intake, afebrile, plan discussed   3/27 -pt seen and examined , feels better pain better controlled, afebrile , on O2 supplementation plan discussed   3/28 - pt seen and examined, denies cp, + epigastric pain controlled with medications, afebrile, tolerating po intake  3/29 - pt seen and examined , denies cp, dyspnea, abdominal pain well controlled    Review of system- Rest of the review of system are negative except mentioned in HPI    Vitals reviewed for last 24 hours  Vitals reviewed for last 24 hours  T(C): 36.5 (03-29-22 @ 08:37), Max: 36.5 (03-29-22 @ 08:37)  T(F): 97.7 (03-29-22 @ 08:37), Max: 97.7 (03-29-22 @ 08:37)  HR: 72 (03-29-22 @ 08:37) (72 - 72)  BP: 154/78 (03-29-22 @ 08:37) (137/72 - 154/78)  RR: 18 (03-29-22 @ 08:37) (18 - 18)  SpO2: 100% (03-29-22 @ 08:37) (100% - 100%)  Wt(kg): --  PHYSICAL EXAM:  GENERAL: cachectic,  NAD  HEAD:  Atraumatic, Normocephalic  EYES: EOMI, PERRLA, conjunctiva and sclera clear  NECK: Supple, No JVD  NERVOUS SYSTEM:  Alert & Oriented X2, moves all extremities  CHEST/LUNG: Clear to auscultation bilaterally; No rales, rhonchi, wheezing, or rubs  HEART: Regular rate and rhythm; No murmurs, rubs, or gallops  ABDOMEN: Soft, fullness, +BS, GJ tube present, bleeding resolved   GENITOURINARY- Voiding, no palpable bladder  EXTREMITIES:  2+ Peripheral Pulses, No clubbing, cyanosis, or edema  MUSCULOSKELETAL No muscle tenderness, Muscle tone normal, No joint tenderness, no Joint swelling, Joint range of motion-normal    RADIOLOGY & ADDITIONAL TESTS: all reviewed      Xray Chest 1 View-PORTABLE IMMEDIATE (Xray Chest 1 View-PORTABLE IMMEDIATE .) (03.25.22 @ 12:56) >  FINDINGS: Cardiac silhouette and pulmonary vasculature are within normal   limits with no consolidation, effusion, gross adenopathy, pneumothorax or   acute osseous finding.    IMPRESSION:  No acute radiographic findings and no change     CT Head No Cont (03.25.22 @ 12:34) >  FINDINGS:   No previous examinations are available for review.    The brain demonstrates mild anterior periventricular white matter   ischemia with multiple old lacunar infarctions in the BILATERAL basal   ganglia and thalami.   No acute cerebral cortical infarct is seen.  No   intracranial hemorrhage is found.  No mass effect is found in the brain.    The ventricles, sulci and basal cisterns appear unremarkable.    The orbits are unremarkable.  The paranasal sinuses are clear.  The nasal   cavity appears intact.  The nasopharynx is symmetric.  The central skull   base, petrous temporal bones and calvarium remain intact.      IMPRESSION:   mild anterior periventricular white matter ischemia with   multiple old lacunar infarctions in the BILATERAL basal ganglia and   thalami.        MR MRCP w/wo IV Cont (03.15.22 @ 15:44) >  Pancreatic head mass suspicious for neoplasm with associated biliary and   pancreatic ductal dilatation. Recommend biopsy.  Upper abdominal lymphadenopathy  Distended stomach. Recommend clinical correlation for partial gastric   outlet obstruction    CT ABDOMEN AND PELVIS IC                        IMPRESSION:  Chronic pancreatitis.  Pancreatic ductal dilatation due to multiple intraductal stones.  Biliary ductal dilatation the exact etiology which could be due to post   pancreatic stricture. Consider further evaluation with MRI or endoscopic   ultrasound as an occult pancreatic mass is not excluded.  Cystic pancreatic lesions most likely representing pseudocysts.  Cholelithiasis and marked gallbladder distention, without evidence of   acute cholecystitis.  Mild left lower lobe opacity suspicious for pneumonia.  Enteric tube with tip in the distal esophagus.    < from: MR Thoracic Spine w/wo IV Cont (03.19.22 @ 12:36) >  IMPRESSION:  Acute osteomyelitis/discitis identified at T6-7 with   enhancement and increased signal within the inferior T6 and superior T7   endplates and within the intervening disc. Enhancement in the   paravertebral soft tissues at this level and ventral epidural space is   noted, LEFT greater than RIGHT.    LABS: All Labs Reviewed:       03-28    132<L>  |  97  |  14  ----------------------------<  191<H>  4.3   |  32<H>  |  0.39<L>    Ca    8.5      28 Mar 2022 07:08    TPro  7.5  /  Alb  1.9<L>  /  TBili  0.8  /  DBili  x   /  AST  92<H>  /  ALT  139<H>  /  AlkPhos  1505<H>  03-28                            8.9    6.90  )-----------( 445      ( 28 Mar 2022 07:08 )             28.3             LIVER FUNCTIONS - ( 28 Mar 2022 07:08 )  Alb: 1.9 g/dL / Pro: 7.5 gm/dL / ALK PHOS: 1505 U/L / ALT: 139 U/L / AST: 92 U/L / GGT: x                   03-27    133<L>  |  98  |  16  ----------------------------<  268<H>  5.0   |  33<H>  |  0.45<L>    Ca    8.7      27 Mar 2022 07:24    TPro  7.8  /  Alb  1.8<L>  /  TBili  0.8  /  DBili  x   /  AST  95<H>  /  ALT  152<H>  /  AlkPhos  1616<H>  03-27                            9.2    9.17  )-----------( 436      ( 27 Mar 2022 07:24 )             29.5     LIVER FUNCTIONS - ( 27 Mar 2022 07:24 )  Alb: 1.8 g/dL / Pro: 7.8 gm/dL / ALK PHOS: 1616 U/L / ALT: 152 U/L / AST: 95 U/L / GGT: x                 03-26    129<L>  |  93<L>  |  11  ----------------------------<  298<H>  4.9   |  32<H>  |  0.48<L>    Ca    8.9      26 Mar 2022 06:45  Mg     1.6     03-25    TPro  7.4  /  Alb  1.8<L>  /  TBili  0.9  /  DBili  x   /  AST  65<H>  /  ALT  158<H>  /  AlkPhos  1547<H>  03-26                        8.8    8.71  )-----------( 432      ( 26 Mar 2022 06:45 )             27.9       CARDIAC MARKERS ( 25 Mar 2022 13:00 )  x     / x     / 38 U/L / x     / x            LIVER FUNCTIONS - ( 26 Mar 2022 06:45 )  Alb: 1.8 g/dL / Pro: 7.4 gm/dL / ALK PHOS: 1547 U/L / ALT: 158 U/L / AST: 65 U/L / GGT: x         < from: Xray Chest 1 View-PORTABLE IMMEDIATE (Xray Chest 1 View-PORTABLE IMMEDIATE .) (03.25.22 @ 12:56) >  FINDINGS: Cardiac silhouette and pulmonary vasculature are within normal   limits with no consolidation, effusion, gross adenopathy, pneumothorax or   acute osseous finding.    IMPRESSION:  No acute radiographic findings and no change    < end of copied text >        MEDS:   acetaminophen     Tablet .. 650 milliGRAM(s) Oral every 6 hours PRN  ALBUTerol    90 MICROgram(s) HFA Inhaler 2 Puff(s) Inhalation every 6 hours PRN  aluminum hydroxide/magnesium hydroxide/simethicone Suspension 30 milliLiter(s) Oral every 4 hours PRN  bisacodyl Suppository 10 milliGRAM(s) Rectal <User Schedule>  doxycycline hyclate Capsule 100 milliGRAM(s) Oral every 12 hours  enoxaparin Injectable 40 milliGRAM(s) SubCutaneous every 24 hours  folic acid 1 milliGRAM(s) Oral daily  insulin glargine Injectable (LANTUS) 8 Unit(s) SubCutaneous every morning  insulin lispro (ADMELOG) corrective regimen sliding scale   SubCutaneous four times a day before meals  melatonin 3 milliGRAM(s) Oral at bedtime PRN  methadone    Tablet 25 milliGRAM(s) Oral every 8 hours  morphine   Solution 15 milliGRAM(s) Oral every 4 hours PRN  multivitamin 1 Tablet(s) Oral daily  ondansetron Injectable 4 milliGRAM(s) IV Push every 8 hours PRN  pancrelipase (ZENPEP 20,000 Lipase Units) 1 Capsule(s) Oral three times a day with meals  pantoprazole  Injectable 40 milliGRAM(s) IV Push every 12 hours  polyethylene glycol 3350 17 Gram(s) Oral two times a day  thiamine 100 milliGRAM(s) Oral daily  zolpidem 5 milliGRAM(s) Oral at bedtime PRN

## 2022-03-29 NOTE — PROGRESS NOTE ADULT - REASON FOR ADMISSION
abdominal pain-- transfer for possible ERCP

## 2022-03-29 NOTE — DISCHARGE NOTE NURSING/CASE MANAGEMENT/SOCIAL WORK - NSDCPEFALRISK_GEN_ALL_CORE
For information on Fall & Injury Prevention, visit: [email or web address] OR  [email or web address] OR  https://www.cdc.gov/steadi/patient.html

## 2022-03-29 NOTE — PROGRESS NOTE ADULT - ASSESSMENT
57 y/o male with PMHX of IVDA with heroin , opioid dependence on methadone, IDDM, chronic pancreatitis, abnormal weight loss over the last 1 year who was a transfer from [facility] on 3/14/22  for possible ERCP.  Patient initially presented to outside hospital for worsening abdominal pain.  Patient had CT/ MRI/ US of the abdomen which showed chronic pancreatitis, gastric distention (for which he had an NGT),  intra/ extra hepatic biliary ductal dilatation, and fluid collection/  mass in the head of the pancreas compressing the SMV and proximal portal vein.    Upon admission, patient with significantly elevated ALK PHOS 1200.      Epigastric abdominal pain due to   Pancreatic mass,  associated with biliary and pancreatic duct dilatation , pancreatic stones with underlying chronic pancreatitis s/p ERCP, EUS s/p FNA consistent with pancreatitis and fibrosis  Partial gastric outlet obstruction due to severe duodenal inflammation s/p NGT now s/p GJ tube   Cholelithiasis and marked gallbladder distention without acute cholecystitis  Elevated CA 19-9 , non-specific , biopsy negative for malignancy   - 3/16/22 - s/p EUS and FNA of the pancreatic mass , ERCP, pancreatic sphincterotomy , s/p pancreatic stent , s/p GJ tube   - s/p GJ tube- resume TFs with recreational PO feeds   - watch for refeeding syndrome; replace Mg/Phos PRN   -  complete workup with tumor markers/CT chest/Heme-Onc Cx - results noted - no mets to chest  - biopsy results consistent with inflammation and fibrosis of the pancreas     Hematemesis 3/15/22 Acute blood loss anemia due to upper GI bleeding   Iron deficiency   - s/p lovenox 40 3/14  - d/sherice   - Cont Protonix drip- change to IVP   - s/p  2 Units PRBC  - SKIN TEAR at GJ site : causing the anemia   - above repaired, H&H stable, no further bleeding  - c/w TF as per nutritional recs      Mild leukocytosis  , resolved   LLL opacity suspected Pneumonia possible aspiration  On CT abd there was suspicion of  LLL PNA and was on ZOsyn  CT chest does not show any consolidation - therefore there is no pneumonia and will stop Zosyn  There is possible discitis on CT imaging and will get MRI to r/o DISCITIS/OM vs Metastatic lesion   Discussed with DR Lewis and started on Doxy for possible discitis - MRI T SPINE confirms this   Patient has a MIDLINE (he came with it)  and plan to  dc especially in light of IVDA - this fell off on 3/19  3/27 check O2 on ambulation for need of O2 , CXR 3/25- clear     DM type2 with A1C 9.9 with hypoglycemic episode on 3/15   on PO Diet, resume low dose lantus and titrate up as tolerated  hold TFs tonight as patient nauseous and abdo full after eating today   - c/w  lantus 8--> 12 --> 15   - add pre-meal insulin, c/w correctional      Abnormal Weight Loss   Severe Protein Calorie Malnutrition/ Cachexia      Severe hypoalbuminemia  Hyponatremia due to  poor po intake    - now off IVFs; is on PO diet and he's eating well; TFs are only supplementary during the night   - recalculated Na 134 due to hyperglycemia, monitor     IVDA/ Opioid Dependence  - Patient was on methadone 60 mg daily, discussed with [facility] Dr Hernandez - will place on methadone 25 TID.     - DC higher dose morphine po as he has been lethargic     3/25  Chest pain, Epigastric pain due to GERD ruled out ACS   - c/w PPI , ruled out  ACS, worsening of pancreatitis   - zofran prn     Dizziness , Cerebrovascular disease with old lacunar infarctions   - CT head - multiple old lacunar infarction  - check lipid profile  - would add ASA 81 enteric coated     Constipation  - c/w miralax, add dulcolax    DVT Proph:  resume LOVENOX 40     DISPO - on PO and TF, d/c planning pending insurance authorizations , check covid test today

## 2022-03-29 NOTE — PROVIDER CONTACT NOTE (OTHER) - BACKGROUND
Pt with chronic pancreatitis, hx IV heroin use, Pt hx IVDA heroin currently on methadone, direct admit from [facility] for ERCP d/t worsening abdominal pain-> chronic pancreatitis and gastric distention. GJ tube placed for tube feeds

## 2022-03-29 NOTE — DISCHARGE NOTE NURSING/CASE MANAGEMENT/SOCIAL WORK - PATIENT PORTAL LINK FT
You can access the FollowMyHealth Patient Portal offered by [facility] by registering at the following website: [email or web address]. By joining [employer]’s FollowMyHealth portal, you will also be able to view your health information using other applications (apps) compatible with our system.

## 2022-03-29 NOTE — PROVIDER CONTACT NOTE (OTHER) - DATE AND TIME:
14-Mar-2022 10:50
15-Mar-2022 00:29
15-Mar-2022 04:55
17-Mar-2022 11:21
17-Mar-2022 17:08
19-Mar-2022 02:40
29-Mar-2022 04:36
17-Mar-2022 08:10

## 2022-03-29 NOTE — PROGRESS NOTE ADULT - PROVIDER SPECIALTY LIST ADULT
Hospitalist
Hospitalist
Gastroenterology
Hospitalist
Infectious Disease
Palliative Care
Palliative Care
Gastroenterology
Hospitalist
Infectious Disease
Palliative Care
Palliative Care
Surgery
Surgery
Infectious Disease
Palliative Care
Surgery
Hospitalist
Palliative Care

## 2022-03-29 NOTE — PROVIDER CONTACT NOTE (OTHER) - ASSESSMENT
Patient vital stable, hr71, bp 153/82, spo2 100%, temp 97.3
Pt stated pain was in epigastric area, Vitals stable, pt refused maalox
patient denies any dizziness or SOB. Vital signs stable.

## 2022-03-29 NOTE — PROGRESS NOTE ADULT - NUTRITIONAL ASSESSMENT
This patient has been assessed with a concern for Malnutrition and has been determined to have a diagnosis/diagnoses of Severe protein-calorie malnutrition and Underweight (BMI < 19).    This patient is being managed with:   Diet Regular-  Tube Feeding Modality: Jejunostomy  Glucerna 1.5 Carlos (GLUCERNA1.5)  Total Volume for 24 Hours (mL): 240  Continuous  Starting Tube Feed Rate {mL per Hour}: 20  Until Goal Tube Feed Rate (mL per Hour): 20  Tube Feed Duration (in Hours): 12  Tube Feed Start Time: 18:00  Tube Feed Stop Time: 06:00  Free Water Flush  Pump   Rate (mL per Hour): 25   Frequency: Every Hour    Duration (Hours): 12    Start Time: 18:00    Stop Time: 06:00  Supplement Feeding Modality:  Oral  Glucerna Shake Cans or Servings Per Day:  1       Frequency:  Two Times a day  Entered: Mar 24 2022  1:36PM    
This patient has been assessed with a concern for Malnutrition and has been determined to have a diagnosis/diagnoses of Severe protein-calorie malnutrition and Underweight (BMI < 19).    This patient is being managed with:   Diet Regular-  Tube Feeding Modality: Jejunostomy  Glucerna 1.5 Carlos (GLUCERNA1.5)  Total Volume for 24 Hours (mL): 480  Continuous  Starting Tube Feed Rate {mL per Hour}: 20  Increase Tube Feed Rate by (mL): 10     Every 4 hours  Until Goal Tube Feed Rate (mL per Hour): 40  Tube Feed Duration (in Hours): 12  Tube Feed Start Time: 18:00  Tube Feed Stop Time: 06:00  Free Water Flush  Free Water Flush Instructions:  25 mL q1 hr from 18:00 to 6:00  Supplement Feeding Modality:  Oral  Glucerna Shake Cans or Servings Per Day:  1       Frequency:  Two Times a day  Entered: Mar 20 2022  9:50AM    
This patient has been assessed with a concern for Malnutrition and has been determined to have a diagnosis/diagnoses of Severe protein-calorie malnutrition and Underweight (BMI < 19).    This patient is being managed with:   Diet Regular-  Tube Feeding Modality: Jejunostomy  Glucerna 1.5 Carlos (GLUCERNA1.5)  Total Volume for 24 Hours (mL): 480  Continuous  Starting Tube Feed Rate {mL per Hour}: 20  Increase Tube Feed Rate by (mL): 10     Every 4 hours  Until Goal Tube Feed Rate (mL per Hour): 40  Tube Feed Duration (in Hours): 12  Tube Feed Start Time: 18:00  Tube Feed Stop Time: 06:00  Free Water Flush  Pump   Rate (mL per Hour): 25   Frequency: Every Hour    Duration (Hours): 12    Start Time: 18:00    Stop Time: 06:00  Supplement Feeding Modality:  Oral  Glucerna Shake Cans or Servings Per Day:  1       Frequency:  Two Times a day  Entered: Mar 21 2022 11:35AM    
This patient has been assessed with a concern for Malnutrition and has been determined to have a diagnosis/diagnoses of Severe protein-calorie malnutrition and Underweight (BMI < 19).    This patient is being managed with:   Parenteral Nutrition - Adult-  Entered: Mar 16 2022 10:00PM    Parenteral Nutrition - Adult-  Entered: Mar 15 2022 10:00PM    Diet NPO after Midnight-     NPO Start Date: 15-Mar-2022   NPO Start Time: 23:59  Entered: Mar 15 2022  9:26PM    Diet NPO-  Except Medications  Entered: Mar 14 2022  6:01PM    
This patient has been assessed with a concern for Malnutrition and has been determined to have a diagnosis/diagnoses of Severe protein-calorie malnutrition and Underweight (BMI < 19).    This patient is being managed with:   Diet Regular-  Tube Feeding Modality: Gastro-Jejunostomy  Glucerna 1.5 Carlos (GLUCERNA1.5)  Total Volume for 24 Hours (mL): 1200  Continuous  Starting Tube Feed Rate {mL per Hour}: 10  Increase Tube Feed Rate by (mL): 10     Every 6 hours  Until Goal Tube Feed Rate (mL per Hour): 50  Tube Feed Duration (in Hours): 24  Tube Feed Start Time: 00:00  Free Water Flush  Free Water Flush Instructions:  Free water flush of 35 cc/hr (total volume 700 mL); encourage PO fluids  Entered: Mar 17 2022  3:25PM    
This patient has been assessed with a concern for Malnutrition and has been determined to have a diagnosis/diagnoses of Severe protein-calorie malnutrition and Underweight (BMI < 19).    This patient is being managed with:   Diet Regular-  Tube Feeding Modality: Gastro-Jejunostomy  Glucerna 1.5 Carlos (GLUCERNA1.5)  Total Volume for 24 Hours (mL): 1200  Continuous  Starting Tube Feed Rate {mL per Hour}: 10  Increase Tube Feed Rate by (mL): 10     Every 6 hours  Until Goal Tube Feed Rate (mL per Hour): 50  Tube Feed Duration (in Hours): 24  Tube Feed Start Time: 00:00  Free Water Flush  Free Water Flush Instructions:  Free water flush of 35 cc/hr (total volume 700 mL); encourage PO fluids  Entered: Mar 17 2022  3:25PM    
This patient has been assessed with a concern for Malnutrition and has been determined to have a diagnosis/diagnoses of Severe protein-calorie malnutrition and Underweight (BMI < 19).    This patient is being managed with:   Diet Regular-  Tube Feeding Modality: Jejunostomy  Glucerna 1.5 Carlos (GLUCERNA1.5)  Total Volume for 24 Hours (mL): 240  Continuous  Starting Tube Feed Rate {mL per Hour}: 20  Until Goal Tube Feed Rate (mL per Hour): 20  Tube Feed Duration (in Hours): 12  Tube Feed Start Time: 18:00  Tube Feed Stop Time: 06:00  Free Water Flush  Pump   Rate (mL per Hour): 25   Frequency: Every Hour    Duration (Hours): 12    Start Time: 18:00    Stop Time: 06:00  Supplement Feeding Modality:  Oral  Glucerna Shake Cans or Servings Per Day:  1       Frequency:  Two Times a day  Entered: Mar 24 2022  1:36PM    
This patient has been assessed with a concern for Malnutrition and has been determined to have a diagnosis/diagnoses of Severe protein-calorie malnutrition and Underweight (BMI < 19).    This patient is being managed with:   Diet Regular-  Tube Feeding Modality: Jejunostomy  Glucerna 1.5 Carlos (GLUCERNA1.5)  Total Volume for 24 Hours (mL): 480  Continuous  Starting Tube Feed Rate {mL per Hour}: 20  Increase Tube Feed Rate by (mL): 10     Every 4 hours  Until Goal Tube Feed Rate (mL per Hour): 40  Tube Feed Duration (in Hours): 12  Tube Feed Start Time: 18:00  Tube Feed Stop Time: 06:00  Free Water Flush  Free Water Flush Instructions:  25 mL q1 hr from 18:00 to 6:00  Supplement Feeding Modality:  Oral  Glucerna Shake Cans or Servings Per Day:  1       Frequency:  Two Times a day  Entered: Mar 20 2022  9:50AM    
This patient has been assessed with a concern for Malnutrition and has been determined to have a diagnosis/diagnoses of Severe protein-calorie malnutrition and Underweight (BMI < 19).    This patient is being managed with:   Diet Regular-  Tube Feeding Modality: Jejunostomy  Glucerna 1.5 Carlos (GLUCERNA1.5)  Total Volume for 24 Hours (mL): 480  Continuous  Starting Tube Feed Rate {mL per Hour}: 20  Increase Tube Feed Rate by (mL): 10     Every 4 hours  Until Goal Tube Feed Rate (mL per Hour): 40  Tube Feed Duration (in Hours): 12  Tube Feed Start Time: 18:00  Tube Feed Stop Time: 06:00  Free Water Flush  Free Water Flush Instructions:  25 mL q1 hr from 18:00 to 6:00  Supplement Feeding Modality:  Oral  Glucerna Shake Cans or Servings Per Day:  1       Frequency:  Two Times a day  Entered: Mar 20 2022  9:50AM    
This patient has been assessed with a concern for Malnutrition and has been determined to have a diagnosis/diagnoses of Severe protein-calorie malnutrition and Underweight (BMI < 19).    This patient is being managed with:   Diet Regular-  Tube Feeding Modality: Gastro-Jejunostomy  Glucerna 1.5 Carlos (GLUCERNA1.5)  Total Volume for 24 Hours (mL): 1200  Continuous  Starting Tube Feed Rate {mL per Hour}: 10  Increase Tube Feed Rate by (mL): 10     Every 6 hours  Until Goal Tube Feed Rate (mL per Hour): 50  Tube Feed Duration (in Hours): 24  Tube Feed Start Time: 00:00  Free Water Flush  Free Water Flush Instructions:  Free water flush of 35 cc/hr (total volume 700 mL); encourage PO fluids  Entered: Mar 17 2022  3:25PM    The following pending diet order is being considered for treatment of Severe protein-calorie malnutrition and Underweight (BMI < 19):  Diet Consistent Carbohydrate w/Evening Snack-  Supplement Feeding Modality:  Oral  Glucerna Shake Cans or Servings Per Day:  1       Frequency:  Two Times a day  Entered: Mar 19 2022 10:37AM  
This patient has been assessed with a concern for Malnutrition and has been determined to have a diagnosis/diagnoses of Severe protein-calorie malnutrition and Underweight (BMI < 19).    This patient is being managed with:   Diet Regular-  Tube Feeding Modality: Jejunostomy  Glucerna 1.5 Carlos (GLUCERNA1.5)  Total Volume for 24 Hours (mL): 240  Continuous  Starting Tube Feed Rate {mL per Hour}: 20  Until Goal Tube Feed Rate (mL per Hour): 20  Tube Feed Duration (in Hours): 12  Tube Feed Start Time: 18:00  Tube Feed Stop Time: 06:00  Free Water Flush  Pump   Rate (mL per Hour): 25   Frequency: Every Hour    Duration (Hours): 12    Start Time: 18:00    Stop Time: 06:00  Supplement Feeding Modality:  Oral  Glucerna Shake Cans or Servings Per Day:  1       Frequency:  Two Times a day  Entered: Mar 24 2022  1:36PM    
This patient has been assessed with a concern for Malnutrition and has been determined to have a diagnosis/diagnoses of Severe protein-calorie malnutrition and Underweight (BMI < 19).    This patient is being managed with:   Diet Regular-  Tube Feeding Modality: Jejunostomy  Glucerna 1.5 Carlos (GLUCERNA1.5)  Total Volume for 24 Hours (mL): 240  Continuous  Starting Tube Feed Rate {mL per Hour}: 20  Until Goal Tube Feed Rate (mL per Hour): 20  Tube Feed Duration (in Hours): 12  Tube Feed Start Time: 18:00  Tube Feed Stop Time: 06:00  Free Water Flush  Pump   Rate (mL per Hour): 25   Frequency: Every Hour    Duration (Hours): 12    Start Time: 18:00    Stop Time: 06:00  Supplement Feeding Modality:  Oral  Glucerna Shake Cans or Servings Per Day:  1       Frequency:  Two Times a day  Entered: Mar 24 2022  1:36PM    Diet Regular-  Tube Feeding Modality: Jejunostomy  Glucerna 1.5 Carlos (GLUCERNA1.5)  Total Volume for 24 Hours (mL): 480  Continuous  Starting Tube Feed Rate {mL per Hour}: 20  Increase Tube Feed Rate by (mL): 10     Every 4 hours  Until Goal Tube Feed Rate (mL per Hour): 40  Tube Feed Duration (in Hours): 12  Tube Feed Start Time: 18:00  Tube Feed Stop Time: 06:00  Free Water Flush  Pump   Rate (mL per Hour): 25   Frequency: Every Hour    Duration (Hours): 12    Start Time: 18:00    Stop Time: 06:00  Supplement Feeding Modality:  Oral  Glucerna Shake Cans or Servings Per Day:  1       Frequency:  Two Times a day  Entered: Mar 21 2022 11:35AM    The following pending diet order is being considered for treatment of Severe protein-calorie malnutrition and Underweight (BMI < 19):null
This patient has been assessed with a concern for Malnutrition and has been determined to have a diagnosis/diagnoses of Severe protein-calorie malnutrition and Underweight (BMI < 19).    This patient is being managed with:   Diet Regular-  Tube Feeding Modality: Jejunostomy  Glucerna 1.5 Carlos (GLUCERNA1.5)  Total Volume for 24 Hours (mL): 480  Continuous  Starting Tube Feed Rate {mL per Hour}: 20  Increase Tube Feed Rate by (mL): 10     Every 4 hours  Until Goal Tube Feed Rate (mL per Hour): 40  Tube Feed Duration (in Hours): 12  Tube Feed Start Time: 18:00  Tube Feed Stop Time: 06:00  Free Water Flush  Free Water Flush Instructions:  25 mL q1 hr from 18:00 to 6:00  Supplement Feeding Modality:  Oral  Glucerna Shake Cans or Servings Per Day:  1       Frequency:  Two Times a day  Entered: Mar 20 2022  9:50AM    
This patient has been assessed with a concern for Malnutrition and has been determined to have a diagnosis/diagnoses of Severe protein-calorie malnutrition and Underweight (BMI < 19).    This patient is being managed with:   Diet Regular-  Tube Feeding Modality: Jejunostomy  Glucerna 1.5 Carlos (GLUCERNA1.5)  Total Volume for 24 Hours (mL): 480  Continuous  Starting Tube Feed Rate {mL per Hour}: 20  Increase Tube Feed Rate by (mL): 10     Every 4 hours  Until Goal Tube Feed Rate (mL per Hour): 40  Tube Feed Duration (in Hours): 12  Tube Feed Start Time: 18:00  Tube Feed Stop Time: 06:00  Free Water Flush  Pump   Rate (mL per Hour): 25   Frequency: Every Hour    Duration (Hours): 12    Start Time: 18:00    Stop Time: 06:00  Supplement Feeding Modality:  Oral  Glucerna Shake Cans or Servings Per Day:  1       Frequency:  Two Times a day  Entered: Mar 21 2022 11:35AM    
This patient has been assessed with a concern for Malnutrition and has been determined to have a diagnosis/diagnoses of Severe protein-calorie malnutrition and Underweight (BMI < 19).    This patient is being managed with:   Diet Regular-  Tube Feeding Modality: Jejunostomy  Glucerna 1.5 Carlos (GLUCERNA1.5)  Total Volume for 24 Hours (mL): 480  Continuous  Starting Tube Feed Rate {mL per Hour}: 20  Increase Tube Feed Rate by (mL): 10     Every 4 hours  Until Goal Tube Feed Rate (mL per Hour): 40  Tube Feed Duration (in Hours): 12  Tube Feed Start Time: 18:00  Tube Feed Stop Time: 06:00  Free Water Flush  Pump   Rate (mL per Hour): 25   Frequency: Every Hour    Duration (Hours): 12    Start Time: 18:00    Stop Time: 06:00  Supplement Feeding Modality:  Oral  Glucerna Shake Cans or Servings Per Day:  1       Frequency:  Two Times a day  Entered: Mar 21 2022 11:35AM

## 2022-03-29 NOTE — PROVIDER CONTACT NOTE (OTHER) - SITUATION
spoke with ans service Yasmeen regarding consult
pt vomited after morphine oral soln was given
spoke with NP Nancy Ballard. She is aware.
PT complaining of SOB and "chest pain" in epigastric region
Pt reporting 10/10 pain 0030 with no available pain medications to administer until 0207.
Q6 blood glucose testing initiated for diabetic pt. on NPO diet. Result 193. Md informed and questioned for coverage.
Notified GI PA that Patient Gjtube dressing saturated and is bleeding
Patient complaining of chest pain this morning 10/10, sharp pain

## 2022-03-29 NOTE — PROVIDER CONTACT NOTE (OTHER) - REASON
Patient Gjtube dressing saturated. Bright blood present
Gastroenterology consult
Patient complaining of chest pain
Blood Glucose
Oncology consult
Pain unrelieved with medication regimen
SOB "chest pain"
pt vomited

## 2022-04-01 ENCOUNTER — OUTPATIENT (OUTPATIENT)
Dept: OUTPATIENT SERVICES | Facility: HOSPITAL | Age: 57
LOS: 1 days | Discharge: HOME | End: 2022-04-01

## 2022-04-04 DIAGNOSIS — F11.20 OPIOID DEPENDENCE, UNCOMPLICATED: ICD-10-CM

## 2022-08-23 NOTE — DIETITIAN NUTRITION RISK NOTIFICATION - ETIOLOGY-BASIS
4.3 cm CT angiogram 2021 for CTA of chest September 2022 follow-up in [facility]   Acute illness or injury

## 2024-12-01 NOTE — ED ADULT NURSE NOTE - EXTENSIONS OF SELF_ADULT
Rex Ndiaye is a 51 year old male patient.  Chief Complaint: retention      Patient Active Problem List   Diagnosis    Type 2 diabetes mellitus with hyperglycemia, with long-term current use of insulin (CMD)    Pyelonephritis    Cystitis    Moderate episode of recurrent major depressive disorder (CMD)    Localized edema    Hypertension associated with stage 3 chronic kidney disease due to type 2 diabetes mellitus  (CMD)    Hyperlipidemia    Hypertension    Diabetic peripheral neuropathy associated with type 2 diabetes mellitus  (CMD)    Chronic low back pain    Chronic kidney disease, stage 3a  (CMD)    Chronic diastolic (congestive) heart failure (CMD)    Benign prostatic hyperplasia with urinary obstruction    Anxiety    Anemia in chronic kidney disease    Hyperparathyroidism, secondary renal  (CMD)    Chronic insomnia    Blister of great toe of left foot    Chronic prescription opiate use    Gastroesophageal reflux disease without esophagitis    Chronic abdominal pain    Anasarca    Upper abdominal pain    Urinary tract infection without hematuria, site unspecified    Encounter for therapeutic drug monitoring    Chronic, continuous use of opioids    Chronic pain syndrome    Penile pain, chronic    UTI (urinary tract infection)     Past Medical History:   Diagnosis Date    Anemia in chronic kidney disease 01/10/2024    Anxiety     Benign prostatic hyperplasia with urinary obstruction 02/20/2024    Chronic diastolic (congestive) heart failure (CMD) 02/20/2024    Chronic kidney disease     Stage 3    Chronic kidney disease, stage 3a  (CMD) 12/05/2023    Chronic low back pain 06/04/2024    Chronic prescription opiate use 06/12/2024    Congestive cardiac failure  (CMD)     Depressive disorder     Diabetes mellitus  (CMD)     Diabetic peripheral neuropathy associated with type 2 diabetes mellitus  (CMD) 02/20/2024    Diabetic retinopathy  (CMD)     Essential (primary) hypertension     Hyperlipidemia 06/04/2024     Hyperparathyroidism, secondary renal  (CMD) 02/20/2024    Hypertension 06/04/2024    Hypertension associated with stage 3 chronic kidney disease due to type 2 diabetes mellitus  (CMD) 02/20/2024    Moderate episode of recurrent major depressive disorder (CMD) 06/04/2024    Type 2 diabetes mellitus with diabetic neuropathy, with long-term current use of insulin (CMD) 09/15/2022     Current Facility-Administered Medications   Medication Dose Route Frequency Provider Last Rate Last Admin    lidocaine (LIDOCARE) 4 % patch 2 patch  2 patch Transdermal Daily Elliot Umana CNP   2 patch at 12/01/24 1211    gabapentin (NEURONTIN) capsule 300 mg  300 mg Oral 2 times per day Elliot Umana CNP   300 mg at 12/01/24 1134    docusate sodium-sennosides (SENOKOT S) 50-8.6 MG 1 tablet  1 tablet Oral BID Elliot Umana CNP   1 tablet at 12/01/24 1207    polyethylene glycol (MIRALAX) packet 17 g  17 g Oral Daily Elliot Umana CNP   17 g at 12/01/24 1208    HYDROcodone-acetaminophen (NORCO) 5-325 MG per tablet 2 tablet  2 tablet Oral Q6H PRN Anup Burton DO   2 tablet at 12/01/24 1210    furosemide (LASIX) tablet 80 mg  80 mg Oral BID Patric Brady MD        oxyCODONE SR (OxyCONTIN) 12 hr tablet 20 mg  20 mg Oral 2 times per day Anup Burton DO        carvedilol (COREG) tablet 12.5 mg  12.5 mg Oral Q12H Cody Marcano MD   12.5 mg at 12/01/24 0317    clonazePAM (KlonoPIN) tablet 0.5 mg  0.5 mg Oral Daily PRN Cody Marcano MD   0.5 mg at 11/30/24 0926    cyclobenzaprine (FLEXERIL) tablet 10 mg  10 mg Oral TID PRN Cody Marcano MD        metoCLOPramide (REGLAN) tablet 10 mg  10 mg Oral Q6H PRN Cody Marcano MD   10 mg at 11/30/24 0951    NIFEdipine XL (PROCARDIA XL) ER tablet 30 mg  30 mg Oral Daily Cody Marcano MD   30 mg at 12/01/24 0947    sucralfate (CARAFATE) 1 GM/10ML suspension 1 g  1 g Oral 4x Daily Cody Marcano MD   1 g at 12/01/24 1208    tamsulosin (FLOMAX) capsule 0.4 mg  0.4 mg Oral QHS  Cody Marcano MD   0.4 mg at 11/30/24 2053    nystatin (MYCOSTATIN) powder   Topical 2 times per day Cody Marcano MD   Given at 12/01/24 0938    cefepime (MAXIPIME) 1,000 mg in sodium chloride 0.9 % 100 mL IVPB  1,000 mg Intravenous 2 times per day Vidal Peterson  mL/hr at 12/01/24 0941 1,000 mg at 12/01/24 0941    DAPTOmycin (CUBICIN) injection 500 mg  6 mg/kg (Adjusted) Intravenous Q24H Vidal Peterson MD   500 mg at 12/01/24 1327    zolpidem (AMBIEN) tablet 5 mg  5 mg Oral Nightly PRN Elliot Umana, CNP   5 mg at 11/30/24 2201    insulin glargine (LANTUS) injection 10 Units  10 Units Subcutaneous Nightly Elliot Umana, CNP   10 Units at 11/30/24 2206    rosuvastatin (CRESTOR) tablet 10 mg  10 mg Oral Daily UmanaElliot sosa, CNP   10 mg at 12/01/24 0947    heparin (porcine) injection 5,000 Units  5,000 Units Subcutaneous 3 times per day Elliot Umana, CNP   5,000 Units at 12/01/24 1327    hydrALAZINE (APRESOLINE) injection 10 mg  10 mg Intravenous Q6H PRN Santi Edmondson, CNP   10 mg at 11/30/24 0127    dextrose 50 % injection 25 g  25 g Intravenous PRN Santi Edmondson, CNP        dextrose 50 % injection 12.5 g  12.5 g Intravenous PRN Santi Edmondson, CNP        glucagon (GLUCAGEN) injection 1 mg  1 mg Intramuscular PRN Santi Edmondson, CNP        dextrose (GLUTOSE) 40 % gel 15 g  15 g Oral PRN Santi Edmondson, CNP        dextrose (GLUTOSE) 40 % gel 30 g  30 g Oral PRN Santi Edmondson, CNP        sodium chloride 0.9 % injection 10 mL  10 mL Intravenous PRN Santi Edmondson, CNP        sodium chloride 0.9 % injection 2 mL  2 mL Intracatheter 2 times per day Santi Edmondson, CNP   2 mL at 12/01/24 0938    insulin lispro (ADMELOG,HumaLOG) - Correction Dose   Subcutaneous 4x Daily WC Santi Edmondson CNP   1 Units at 12/01/24 1203    ondansetron (ZOFRAN ODT) disintegrating tablet 4 mg  4 mg Oral Q12H PRN Santi Edmondson CNP   4 mg at 11/30/24 1314    Or    ondansetron (ZOFRAN) injection 4 mg  4 mg Intravenous Q6H PRN Debo,  None Jeric, CNP   4 mg at 11/30/24 0416    polyethylene glycol (MIRALAX) packet 17 g  17 g Oral Daily PRN Santi Edmondson CNP        melatonin tablet 3 mg  3 mg Oral Nightly PRN Santi Edmondson CNP        pantoprazole (PROTONIX INJECT) injection 40 mg  40 mg Intravenous 2 times per day Santi Edmondson CNP   40 mg at 12/01/24 0947     ALLERGIES:  No Known Allergies  Principal Problem:    UTI (urinary tract infection)    Blood pressure (!) 163/84, pulse 68, temperature 98.1 °F (36.7 °C), temperature source Oral, resp. rate 16, height 5' 11\" (1.803 m), weight 89.2 kg (196 lb 9.6 oz), SpO2 95%.    Subjective  Patient sleeping comfortably    Objective:  Vital signs: (most recent): Blood pressure (!) 163/84, pulse 68, temperature 98.1 °F (36.7 °C), temperature source Oral, resp. rate 16, height 5' 11\" (1.803 m), weight 89.2 kg (196 lb 9.6 oz), SpO2 95%.    General Asleep   respiratory-nonlabored   -catheter in place draining clear yellow urine    Assessment & Plan  The patient is a 51 year old male with chronic urinary retention secondary to neurogenic bladder.  Admitted due to unable to straight cath for 3 days.     Plan  -Coudé Riley catheter placed in ER, draining well  -Continue indwelling catheter  -Follow-up with  in 1 to 2 weeks  -Antibiotics per infectious disease      Nikita Chevalier, MD